# Patient Record
Sex: FEMALE | Race: WHITE | Employment: OTHER | URBAN - METROPOLITAN AREA
[De-identification: names, ages, dates, MRNs, and addresses within clinical notes are randomized per-mention and may not be internally consistent; named-entity substitution may affect disease eponyms.]

---

## 2017-01-10 ENCOUNTER — OFFICE VISIT (OUTPATIENT)
Dept: PALLIATIVE MEDICINE | Facility: CLINIC | Age: 62
End: 2017-01-10
Payer: MEDICARE

## 2017-01-10 VITALS
BODY MASS INDEX: 23.49 KG/M2 | WEIGHT: 150 LBS | HEART RATE: 69 BPM | DIASTOLIC BLOOD PRESSURE: 59 MMHG | SYSTOLIC BLOOD PRESSURE: 118 MMHG

## 2017-01-10 DIAGNOSIS — G89.29 CHRONIC BILATERAL LOW BACK PAIN WITH RIGHT-SIDED SCIATICA: ICD-10-CM

## 2017-01-10 DIAGNOSIS — M79.18 MYOFASCIAL PAIN: ICD-10-CM

## 2017-01-10 DIAGNOSIS — M96.1 FAILED BACK SURGICAL SYNDROME: ICD-10-CM

## 2017-01-10 DIAGNOSIS — M54.2 NECK PAIN: ICD-10-CM

## 2017-01-10 DIAGNOSIS — R25.2 SPASM: Primary | ICD-10-CM

## 2017-01-10 DIAGNOSIS — M54.41 CHRONIC BILATERAL LOW BACK PAIN WITH RIGHT-SIDED SCIATICA: ICD-10-CM

## 2017-01-10 PROCEDURE — 99214 OFFICE O/P EST MOD 30 MIN: CPT | Mod: 25 | Performed by: ANESTHESIOLOGY

## 2017-01-10 PROCEDURE — 20552 NJX 1/MLT TRIGGER POINT 1/2: CPT | Performed by: ANESTHESIOLOGY

## 2017-01-10 RX ORDER — METHOCARBAMOL 500 MG/1
500 TABLET, FILM COATED ORAL 3 TIMES DAILY PRN
Qty: 90 TABLET | Refills: 1 | Status: SHIPPED | OUTPATIENT
Start: 2017-01-10 | End: 2017-12-05

## 2017-01-10 RX ORDER — HYDROCODONE BITARTRATE AND ACETAMINOPHEN 5; 325 MG/1; MG/1
1 TABLET ORAL EVERY 6 HOURS PRN
Qty: 75 TABLET | Refills: 0 | Status: SHIPPED | OUTPATIENT
Start: 2017-01-10 | End: 2017-02-22

## 2017-01-10 ASSESSMENT — PAIN SCALES - GENERAL: PAINLEVEL: EXTREME PAIN (8)

## 2017-01-10 NOTE — MR AVS SNAPSHOT
After Visit Summary   1/10/2017    Yusra Rivas    MRN: [id number]           Patient Information     Date Of Birth          1955        Visit Information        Provider Department      1/10/2017 2:00 PM Antoine Ibrahim MD [facility]        Today's Diagnoses     Spasm-trapezius     -  1     Neck Pain-right occipital pain from fall          Myofascial pain           Care Instructions    Assessment:  1.  Chronic pain syndrome  2.  Chronic post fusion lower back pain  3.  Diffuse myofascial pain (muscle pain)  4.  Muscle spasm/dystonia  5.  History of closed head injury  6.  Lumbar radiculopathy (radiating pain)  7.  Depression/anxiety  8.  Right foot drop  9.  Proximal lower extremity weakness  10.  Chronic headache  11.  History of breast cancer, lung cancer    Plan:  1.  Pain Physical therapy eval and treat - continue with physical therapy  2.  Pain Psychology eval and treat - continue to practice coping skills, relaxation techniques, biofeedback as instructed by Santo Adames LP  3.  Continue Gabapentin 900 mg three times per day for nerve related pain and myofascial pain  4.  Continue Norco 5/325 - maximum 3 times per day on bad days.   5.  Continue Flexeril as needed for muscle spasm and to help with sleep  6.  Robaxin 750 mg TID prn spasm  7.  Continue Zonegran for headache  8.  Lumbar trigger point injections for myofascial pain - performed today and may be repeated as needed and may be double booked  9.  Continue home exercise    10.  Trigger point injections may be helpful for persistent muscle spasm - may be double booked  11.  Discussed the possibility of spinal cord stimulation as a modality to decrease pain and improve function - patient given DVDs today  12.  Follow up 8-10 weeks          Nurse Triage line:  [phone]   Call this number with any questions or concerns. You may leave a detailed message anytime. Calls are typically returned Monday through Friday  between 8 AM and 4:30 PM. We usually get back to you within 2 business days depending on the issue/request.       Medication refills:    For non-narcotic medications, call your pharmacy directly to request a refill. The pharmacy will contact the [facility] for authorization. Please allow 3-4 days for these refills to be processed.     For narcotic refills, call the nurse triage line or send a [employer] message. Please contact us 7-10 days before your refill is due. The message MUST include the name of the specific medication(s) requested and how you would like to receive the prescription(s). The options are as follows:    Pain Clinic staff can mail the prescription to your pharmacy. Please tell us the name of the pharmacy.    You may pick the prescription up at the Pain Clinic (tell us the location) or during a clinic visit with your pain provider    Pain Clinic staff can deliver the prescription to the [place] pharmacy in the clinic building. Please tell us the location.      Scheduling number: [phone].  Call this number to schedule or change appointments.    We believe regular attendance is key to your success in our program.    Any time you are unable to keep your appointment we ask that you call us at least 24 hours in advance to let us know. This will allow us to offer the appointment time to another patient.             Follow-ups after your visit        Your next 10 appointments already scheduled     Jan 18, 2017  1:00 PM   Return Visit with Kamaljit Ramsay LP   [facility] ([facility])    [address] [facility] [address]   [phone]            Jan 18, 2017  2:30 PM   Return Visit with Dheeraj Lara PT   [facility] ([facility])    [address] [address] [facility] [address]   [phone]            Feb 21, 2017  1:30 PM   LAB with NL LAB [facility] ([facility])     [address] [facility] [address]   [phone]           Patient must bring picture ID.  Patient should be prepared to give a urine specimen  Please do not eat 10-12 hours before your appointment if you are coming in fasting for labs on lipids, cholesterol, or glucose (sugar).  Pregnant women should follow their Care Team instructions. Water with medications is okay. Do not drink coffee or other fluids.   If you have concerns about taking  your medications, please ask at office or if scheduling via [employer], send a message by clicking on Secure Messaging, Message Your Care Team.            Feb 21, 2017  2:00 PM   CT CHEST W/O CONTRAST with PHCT1   [facility] CT Scan ([facility])    [address] [place] [address] [id number] [phone]           Please bring any scans or X-rays taken at other hospitals, if similar tests were done. Also bring a list of your medicines, including vitamins, minerals and over-the-counter drugs. It is safest to leave personal items at home.  Be sure to tell your doctor:   If you have any allergies.   If there s any chance you are pregnant.   If you are breastfeeding.   If you have any special needs.  You do not need to do anything special to prepare.  Please wear loose clothing, such as a sweat suit or jogging clothes. Avoid snaps, zippers and other metal. We may ask you to undress and put on a hospital gown.            Feb 28, 2017  1:00 PM   Return Visit with Sandra Arroyo MD   [facility]    [address] [place] [address] [id number] [phone]              Who to contact     If you have questions or need follow up information about today's clinic visit or your schedule please contact [facility] directly at [phone].  Normal or non-critical lab and imaging results will be communicated to you by MyChart, letter or phone within 4 business days after the clinic has received the  "results. If you do not hear from us within 7 days, please contact the clinic through [employer] or phone. If you have a critical or abnormal lab result, we will notify you by phone as soon as possible.  Submit refill requests through [employer] or call your pharmacy and they will forward the refill request to us. Please allow 3 business days for your refill to be completed.          Additional Information About Your Visit        appMobi Information     [employer]obi lets you send messages to your doctor, view your test results, renew your prescriptions, schedule appointments and more. To sign up, go to [email or web address] . Click on \"Log in\" on the left side of the screen, which will take you to the Welcome page. Then click on \"Sign up Now\" on the right side of the page.     You will be asked to enter the access code listed below, as well as some personal information. Please follow the directions to create your username and password.     Your access code is: [email or web address]  Expires: 4/10/2017  3:13 PM     Your access code will  in 90 days. If you need help or a new code, please call your [place] clinic or [phone].        Care EveryWhere ID     This is your Care EveryWhere ID. This could be used by other organizations to access your [place] medical records  [id number]        Your Vitals Were     Pulse                   69            Blood Pressure from Last 3 Encounters:   01/10/17 118/59   16 106/61   16 107/73    Weight from Last 3 Encounters:   01/10/17 68.04 kg (150 lb)   16 66.225 kg (146 lb)   16 66.225 kg (146 lb)              Today, you had the following     No orders found for display         Today's Medication Changes          These changes are accurate as of: 1/10/17  3:13 PM.  If you have any questions, ask your nurse or doctor.               Start taking these medicines.        Dose/Directions    methocarbamol 500 MG tablet   Commonly known as:  ROBAXIN   Used for:  " Myofascial pain        Dose:  500 mg   Take 1 tablet (500 mg) by mouth 3 times daily as needed for muscle spasms   Quantity:  90 tablet   Refills:  1         These medicines have changed or have updated prescriptions.        Dose/Directions    HYDROcodone-acetaminophen 5-325 MG per tablet   Commonly known as:  NORCO   This may have changed:  additional instructions   Used for:  Spasm, Neck pain        Dose:  1 tablet   Take 1 tablet by mouth every 6 hours as needed for pain Max of 3 tablets per day on bad days. This is a 30 day supply.  Dispense on/after 1/30/17. To start on 2/1/17.   Quantity:  75 tablet   Refills:  0            Where to get your medicines      These medications were sent to [facility] [place], [place] - [address]  [address] [facility] [address]     Phone:  [phone]    - methocarbamol 500 MG tablet      Some of these will need a paper prescription and others can be bought over the counter.  Ask your nurse if you have questions.     Bring a paper prescription for each of these medications    - HYDROcodone-acetaminophen 5-325 MG per tablet             Primary Care Provider Office Phone # Fax #    Munir Kamaljit Lynn [phone]       [address] [facility] [address]        Thank you!     Thank you for choosing [facility]  for your care. Our goal is always to provide you with excellent care. Hearing back from our patients is one way we can continue to improve our services. Please take a few minutes to complete the written survey that you may receive in the mail after your visit with us. Thank you!             Your Updated Medication List - Protect others around you: Learn how to safely use, store and throw away your medicines at www.disposemymeds.org.          This list is accurate as of: 1/10/17  3:13 PM.  Always use your most recent med list.                   Brand Name Dispense Instructions for use    aspirin 81 MG  tablet     30 tablet    Take 1 tablet (81 mg) by mouth daily       cholecalciferol 1000 UNIT tablet    vitamin D    200 tablet    TAKE TWO TABLETS BY MOUTH EVERY DAY       cyclobenzaprine 10 MG tablet    FLEXERIL    30 tablet    Take 1 tablet (10 mg) by mouth At Bedtime       * docusate sodium 100 MG tablet    COLACE    60 tablet    Take 100 mg by mouth daily       *  MG capsule   Generic drug:  docusate sodium     100 capsule    TAKE ONE CAPSULE BY MOUTH EVERY DAY       gabapentin 300 MG capsule    NEURONTIN    270 capsule    Increase the Gabapentin to 900 mg three time a day.       HYDROcodone-acetaminophen 5-325 MG per tablet    NORCO    75 tablet    Take 1 tablet by mouth every 6 hours as needed for pain Max of 3 tablets per day on bad days. This is a 30 day supply.  Dispense on/after 1/30/17. To start on 2/1/17.       methocarbamol 500 MG tablet    ROBAXIN    90 tablet    Take 1 tablet (500 mg) by mouth 3 times daily as needed for muscle spasms       metoprolol 50 MG tablet    LOPRESSOR    180 tablet    TAKE ONE TABLET BY MOUTH TWICE A DAY       multivitamin Tabs per tablet     60 tablet    TAKE ONE TABLET BY MOUTH EVERY DAY       omeprazole 20 MG CR capsule    priLOSEC    90 capsule    TAKE ONE CAPSULE BY MOUTH EVERY DAY       polyethylene glycol powder    MIRALAX/GLYCOLAX    527 g    STIR 17 GM OF POWDER (SEE DEXTER INSIDE CAP) IN 8-OZ OF LIQUID UNTIL COMPLETELY DISSOLVED. DRINK THE SOLUTION DAILY OR AS DIRECTED.       zonisamide 25 MG capsule    Zonegran         * Notice:  This list has 2 medication(s) that are the same as other medications prescribed for you. Read the directions carefully, and ask your doctor or other care provider to review them with you.

## 2017-01-10 NOTE — NURSING NOTE
"Chief Complaint   Patient presents with     Pain       Initial /59 mmHg  Pulse 69  Wt 68.04 kg (150 lb) Estimated body mass index is 23.49 kg/(m^2) as calculated from the following:    Height as of 11/30/16: 1.702 m (5' 7\").    Weight as of this encounter: 68.04 kg (150 lb).  BP completed using cuff size: ab Kinsey CMA (JAD)      "

## 2017-01-10 NOTE — PROGRESS NOTES
[facility]    Date of visit: 1/10/2017    Chief complaint:   Chief Complaint   Patient presents with     Pain       Interval history:  Yusra Rivas was last seen by me on 11/30/16.      Recommendations/plan at the last visit included:  Plan:  1.  Pain Physical therapy eval and treat - balance, stretching, strengthening  2.  Pain Psychology eval and treat - Santo Adames - coping skills, relaxation techniques, biofeedback, ect.  3.  Would increase the Gabapentin gradually to 900 mg three times per day for nerve related pain and myofascial pain  4.  Continue Norco 5/325 twice a day as prescribed.  I don't prescribe pain medications on the first visit.  Will get urine drug screen and if appropriate could take over prescribing until stable on medications at which time PCP could take back over.     5.  Continue Flexeril as needed for muscle spasm  6.  Continue Zonegran for headache  7.  May benefit from lumbar epidural steroid injection - will schedule caudal epidural  8.  Continue home exercise    9.  Trigger point injections may be helpful for persistent muscle spasm - may be double booked  10.  Follow up 6-8 weeks    Patient underwent Caudal epidural steroid injection on 12/12/16 in [place].  She states that she had improvement in her pain for about 2 days and then she started having pain and spam in the muscles along the spine on the right.    Since her last visit, Yusra Rivas reports:  Continued right lower back pain with pain down the right lower extremity to the knee.  She states that the left back is good and her left side is better since the injection.  The lower back pain is worse than the leg pain.  The right leg will give out occasionally as well with the pain.  She complains of numbness in the right thigh and she feels the right leg is weaker than the left with it giving out now and then.  The pain is described as aching, burning, throbbing, sharp. Worse with bending over and  better with putting pressure on the muscles of the lower back with her fists.    She continues to attend physical therapy and has only had one session since the epidural steroid injection.  She did have a couple of sessions with Santo Adames LP for relaxation techniques and coping skills.  She is stable on her medications without side effects.    Pain scores:  Pain intensity on average is 8 on a scale of 0-10.     Current pain treatments:   Norco 5/325 one tab BID  Gabapentin 300 mg 3 capsules TID  Flexeril 10 mg at bedtime    Past pain treatments:  Indomethacin - GI problems  Percocet - no different than hydrocodone  Relafen - made heart race    Side Effects: denies any problems    Medications:  Current Outpatient Prescriptions   Medication Sig Dispense Refill     polyethylene glycol (MIRALAX/GLYCOLAX) powder STIR 17 GM OF POWDER (SEE DEXTER INSIDE CAP) IN 8-OZ OF LIQUID UNTIL COMPLETELY DISSOLVED. DRINK THE SOLUTION DAILY OR AS DIRECTED. 527 g 5     HYDROcodone-acetaminophen (NORCO) 5-325 MG per tablet Take 1 tablet by mouth every 6 hours as needed for pain Max of 2 tablets per day. This is a 30 day supply.  Dispense on/after 1/2/17. To start on 1/3/17. 60 tablet 0      MG capsule TAKE ONE CAPSULE BY MOUTH EVERY  capsule 3     gabapentin (NEURONTIN) 300 MG capsule Increase the Gabapentin to 900 mg three time a day. 270 capsule 3     omeprazole (PRILOSEC) 20 MG capsule TAKE ONE CAPSULE BY MOUTH EVERY DAY 90 capsule 2     VITAMIN D3 1000 UNITS tablet TAKE TWO TABLETS BY MOUTH EVERY  tablet 3     cyclobenzaprine (FLEXERIL) 10 MG tablet Take 1 tablet (10 mg) by mouth At Bedtime 30 tablet 0     metoprolol (LOPRESSOR) 50 MG tablet TAKE ONE TABLET BY MOUTH TWICE A  tablet 3     zonisamide (ZONEGRAN) 25 MG capsule        Multiple Vitamins-Minerals (I-GOLDIE) TABS TAKE ONE TABLET BY MOUTH EVERY DAY 60 tablet 8     aspirin 81 MG tablet Take 1 tablet (81 mg) by mouth daily 30 tablet 11     docusate  sodium (COLACE) 100 MG tablet Take 100 mg by mouth daily 60 tablet 1       Medical History: any changes in medical history since they were last seen? No    Review of Systems:  The 14 system ROS was reviewed from the intake questionnaire, and is positive for: vision changes, ringing in the ears, stiffness, back pain, neck pain, weakness, numbness and tingling, tremor, stress  Any bowel or bladder problems: denies changes  Mood: good    Physical Exam:  /59 mmHg  Pulse 69  Wt 68.04 kg (150 lb)  Constitutional: alert and no distress.  Pt thin, healthy appearing  Head: Normocephalic. No masses, lesions, tenderness or abnormalities  ENT: EOMI, mucosal surfaces moist.  Neck with full ROM, posture fair  Cardiovascular: No edema or JVD appreciated.  Respiratory: Good diaphragmatic excursion. No wheezes appreciated.  Speaking in complete sentences without shortness of breath.  No accessory muscle use.   Musculoskeletal: extremities normal- no gross deformities noted, normal muscle tone and able to move about the exam room without difficulty.    Skin: no suspicious lesions or rashes appreciated on exposed areas  Neurologic: Gait initially antalgic - favors the right side.  Able to walk on toes and heels. Moving all extremities spontaneously, no apparent weakness.    Psychiatric: mentation appears normal, affect full and good eye contact.      Lumbar Spine:  Tender with palpable spasm of the lumbar paravertebral muscles on the right.  Tender at the right SI joint as well.  Flexes less than 90 degrees with pulling in back.  Facet loading negative.  Seated straight leg raise mildly positive on the right, negative left.    Trigger point injection procedure note:  Trigger points along the right lower lumbar spine were identified by patient, and marked when appropriate.  The area was prepped with Chloroprep.    Using clean technique, injections were completed using a 30 G, 1 inch needle.  After negative aspiration, injection  was completed.  A total of 6 locations were injected.  When possible, tissue was retracted from the chest wall to avoid lung injury.    Muscle groups injected:  Lumbar paravertebral muscles on the right    Injection solution contained:  8 ml of 0.5% bupivacaine combined with Depomedrol 20 mg.    Post procedure breath sounds were normal. Lower back pain and stiffness related to spasm was significantly improved following the injections.    Pre-procedure pain:  8/10     Post-procedure pain:  2/10      Assessment:  1.  Chronic pain syndrome  2.  Chronic post fusion lower back pain  3.  Diffuse myofascial pain (muscle pain)  4.  Muscle spasm/dystonia  5.  History of closed head injury  6.  Lumbar radiculopathy (radiating pain)  7.  Depression/anxiety  8.  Right foot drop  9.  Proximal lower extremity weakness  10.  Chronic headache  11.  History of breast cancer, lung cancer    Plan:  1.  Pain Physical therapy eval and treat - continue with physical therapy  2.  Pain Psychology eval and treat - continue to practice coping skills, relaxation techniques, biofeedback as instructed by Santo Adames LP  3.  Continue Gabapentin 900 mg three times per day for nerve related pain and myofascial pain  4.  Continue Norco 5/325 - maximum 3 times per day on bad days.   5.  Continue Flexeril as needed for muscle spasm and to help with sleep  6.  Robaxin 750 mg TID prn spasm  7.  Continue Zonegran for headache  8.  Lumbar trigger point injections for myofascial pain - performed today and may be repeated as needed and may be double booked  9.  Continue home exercise    10.  Trigger point injections may be helpful for persistent muscle spasm - may be double booked  11.  Discussed the possibility of spinal cord stimulation as a modality to decrease pain and improve function - patient given DVDs today  12.  Follow up 8-10 weeks      Total time spent was 45 minutes, and more than 50% of face to face time was spent in counseling and/or  coordination of care regarding medication management, physical therapy, home exercise, behavioral modification, and interventional procedures to decrease pain and improve function..

## 2017-01-10 NOTE — PATIENT INSTRUCTIONS
Assessment:  1.  Chronic pain syndrome  2.  Chronic post fusion lower back pain  3.  Diffuse myofascial pain (muscle pain)  4.  Muscle spasm/dystonia  5.  History of closed head injury  6.  Lumbar radiculopathy (radiating pain)  7.  Depression/anxiety  8.  Right foot drop  9.  Proximal lower extremity weakness  10.  Chronic headache  11.  History of breast cancer, lung cancer    Plan:  1.  Pain Physical therapy eval and treat - continue with physical therapy  2.  Pain Psychology eval and treat - continue to practice coping skills, relaxation techniques, biofeedback as instructed by Santo Adames LP  3.  Continue Gabapentin 900 mg three times per day for nerve related pain and myofascial pain  4.  Continue Norco 5/325 - maximum 3 times per day on bad days.   5.  Continue Flexeril as needed for muscle spasm and to help with sleep  6.  Robaxin 750 mg TID prn spasm  7.  Continue Zonegran for headache  8.  Lumbar trigger point injections for myofascial pain - performed today and may be repeated as needed and may be double booked  9.  Continue home exercise    10.  Trigger point injections may be helpful for persistent muscle spasm - may be double booked  11.  Discussed the possibility of spinal cord stimulation as a modality to decrease pain and improve function - patient given DVDs today  12.  Follow up 8-10 weeks          Nurse Triage line:  [phone]   Call this number with any questions or concerns. You may leave a detailed message anytime. Calls are typically returned Monday through Friday between 8 AM and 4:30 PM. We usually get back to you within 2 business days depending on the issue/request.       Medication refills:    For non-narcotic medications, call your pharmacy directly to request a refill. The pharmacy will contact the [facility] for authorization. Please allow 3-4 days for these refills to be processed.     For narcotic refills, call the nurse triage line or send a [employer] message.  Please contact us 7-10 days before your refill is due. The message MUST include the name of the specific medication(s) requested and how you would like to receive the prescription(s). The options are as follows:    Pain Clinic staff can mail the prescription to your pharmacy. Please tell us the name of the pharmacy.    You may pick the prescription up at the Pain Clinic (tell us the location) or during a clinic visit with your pain provider    Pain Clinic staff can deliver the prescription to the [place] pharmacy in the clinic building. Please tell us the location.      Scheduling number: [phone].  Call this number to schedule or change appointments.    We believe regular attendance is key to your success in our program.    Any time you are unable to keep your appointment we ask that you call us at least 24 hours in advance to let us know. This will allow us to offer the appointment time to another patient.

## 2017-01-11 ENCOUNTER — TRANSFERRED RECORDS (OUTPATIENT)
Dept: HEALTH INFORMATION MANAGEMENT | Facility: CLINIC | Age: 62
End: 2017-01-11

## 2017-01-18 ENCOUNTER — OFFICE VISIT (OUTPATIENT)
Dept: PALLIATIVE MEDICINE | Facility: CLINIC | Age: 62
End: 2017-01-18
Payer: MEDICARE

## 2017-01-18 DIAGNOSIS — G89.29 CHRONIC PAIN: Primary | ICD-10-CM

## 2017-01-18 DIAGNOSIS — G89.4 CHRONIC PAIN SYNDROME: Primary | ICD-10-CM

## 2017-01-18 DIAGNOSIS — M54.16 LUMBAR RADICULOPATHY: ICD-10-CM

## 2017-01-18 DIAGNOSIS — M79.18 MYOFASCIAL PAIN SYNDROME: ICD-10-CM

## 2017-01-18 PROCEDURE — 97112 NEUROMUSCULAR REEDUCATION: CPT | Mod: GP | Performed by: PHYSICAL THERAPIST

## 2017-01-18 PROCEDURE — 99215 OFFICE O/P EST HI 40 MIN: CPT | Performed by: PSYCHOLOGIST

## 2017-01-18 PROCEDURE — 97110 THERAPEUTIC EXERCISES: CPT | Mod: GP | Performed by: PHYSICAL THERAPIST

## 2017-01-18 NOTE — PROGRESS NOTES
"Patient Name: Yusra Rivas       YOB: 1955      Medical Record Number: [id number]  Diagnosis:     Chronic pain  Lumbar radiculopathy  Myofascial pain syndrome  Visit Info  Length of Visit: 45  Arrived: On Time    Exercise/Activity  Education Instruction:  Postural Training/Anatomy  Pacing/Energy Conservation  Home Program  Self Care  Activity:  There Ex 25':  Aerobic Conditioning (*see \"Strength/Functional Actitivities Record for details of exercises performed)  Declined bike (fearful it would flare her back but willing to do UBE, walking)  UBE x 6'  Jamison walk with cues for soft heel strike, mindful walking component  Stretching:  Upper Ext  bilateral, Lower Ext  Bilateral  Added stretch for standing calf  Postural Training:  walking focus  Standing in front of mirror, added scaption (arm raises) with cues for thumbs up, soft knee flexion, wider DONNA (pt was standing with feet together)    Neuro re-ed 15:  Work on neutral spine position rafia for starting position and prep for dynamic  Diaphragm breathing in sitting   Sequence of 'breathe, stabilize, move' concept with functional application  HR check with ed on relation to pain, nervous system.  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  Need to emphasize importance of HEP compliance to maximize pain management skills and ability. Pt was unable to verbalize the three chronic pain PT principles of Body Awareness, Self Care and Aerobic Exercise.  Pt does have handout with this info on it and this was stated and re-emphasized.   Demonstrates/Verbalizes Technique: 2, 3 (1= poor technique-difficulty performing exercises,significant cues required; 5= good technique-performs exercises without cues)  Body Awareness: 2, 3 (1=low awareness; 5=high " awareness)  Posture/Stability: 2, 3 (1= poor posture, stability; 5= good posture, stability)    Motivational Level:    Ask questions, Cooperative and Distracted, in pain  Participation:  Full    Patient Satisfaction:  satisfied    Response to Teaching:    cooperative, needs reinforcement and asked questions  Factors that affect learning: None    Plan of Care  Recommend to continue PT to support reactivation and integration of self regulation pain management skills. Will continue to emphasize the importance of patient's compliance with HEP and self care efforts to maximize her pain management skills and strategies.   Therapist: Dheeraj Lara PT                 Date:  1/18/2017

## 2017-01-19 NOTE — PROGRESS NOTES
[facility]   [facility], [place]  Behavioral Medicine Visit    Patient Name: Yusra Rivas    YOB: 1955   Medical Record Number: [id number]  Date: 1/18/2017                SUBJECTIVE:  Session objective: Reports she is about the same and she is okay with that. She reports she feels she is as equipped as she can be to cope with there pain and that she feels she has a solid plan in place. Reports she feels as though she is done for this episode of care    OBJECTIVE:   Length of Visit: 45 minutes      Mental status: healthy, alert and no distress    ASSESSMENT:   Axis I: Chronic pain syndrome  Axis II: Dx deferred    Progress toward goals: fair.      Pain Status: unchanged    Emotional Status: unchanged   Medication / chemical use concerns: None    PLAN:   Next Appointment: none  Assignment: none  Objectives / interventions for next session: none    Kamaljit Ramsay MA, LP, [facility]  Pain Specialist  [facility] [place]

## 2017-01-26 ENCOUNTER — TRANSFERRED RECORDS (OUTPATIENT)
Dept: HEALTH INFORMATION MANAGEMENT | Facility: CLINIC | Age: 62
End: 2017-01-26

## 2017-01-31 NOTE — PROGRESS NOTES
I have reviewed the Behavioral Medicine Visit note on Yusra Rivas by Santo Adames MA, LP which included discussion of pain coping skills and agree with the assessment and the plan to discontinue Behavioral Medicine visits at this time.    Antoine Narvaez MD  [facility]

## 2017-02-08 ENCOUNTER — OFFICE VISIT (OUTPATIENT)
Dept: PALLIATIVE MEDICINE | Facility: CLINIC | Age: 62
End: 2017-02-08
Payer: MEDICARE

## 2017-02-08 DIAGNOSIS — M54.16 LUMBAR RADICULOPATHY: ICD-10-CM

## 2017-02-08 DIAGNOSIS — M79.18 MYOFASCIAL PAIN SYNDROME: ICD-10-CM

## 2017-02-08 DIAGNOSIS — G89.29 CHRONIC PAIN: Primary | ICD-10-CM

## 2017-02-08 PROCEDURE — 97112 NEUROMUSCULAR REEDUCATION: CPT | Mod: GP | Performed by: PHYSICAL THERAPIST

## 2017-02-08 PROCEDURE — 97110 THERAPEUTIC EXERCISES: CPT | Mod: GP | Performed by: PHYSICAL THERAPIST

## 2017-02-08 NOTE — PROGRESS NOTES
"Patient Name: Yusra Rivas       YOB: 1955      Medical Record Number: [id number]  Diagnosis:     Chronic pain  Lumbar radiculopathy  Myofascial pain syndrome  Visit Info   Length of Visit: 45  Arrived: On Time     Exercise/Activity   Education Instruction:  Postural Training/Anatomy  Pacing/Energy Conservation  Home Program  Self Care  Activity:  There Ex 25':  Aerobic Conditioning (*see \"Strength/Functional Actitivities Record for details of exercises performed)  Bike x 5'  UBE x 8'  Jamison walk with brief postural cues  Stretching:  Upper Ext  bilateral, Lower Ext  Bilateral  Performed stretch routine with 'breathe, stabilize, move' prep  Postural Training:  walking focus  Standing in front of mirror, performed scaption with improved control, pace of movement    Neuro re-ed 15:  Verbal body scan with pt in supine position.  Emphasis on turning off power, overused muscles.  Cues to exhale through mouth helpful, rafia to turn off masseter. Gave Relaxation CD to simulate verbal body scan.    Worked on neutral spine position with walking focus  Diaphragm breathing in supine  Sequence of 'breathe, stabilize, move' concept with functional application  Continued ed and discussion on Self care and aerobic exercise    Notes:    Better tolerance to today's session than last visit.  Continues to slowly progress toward goals.  Need to emphasize importance of HEP compliance to maximize pain management skills and ability.    Demonstrates/Verbalizes Technique:  3 (1= poor technique-difficulty performing exercises,significant cues required; 5= good technique-performs exercises without cues)  Body Awareness:  3 (1=low awareness; 5=high awareness)  Posture/Stability:  3 (1= poor posture, stability; 5= good posture, stability)     Motivational Level:    Ask questions, Cooperative and Distracted, in pain   Participation:  Full     Patient Satisfaction:  satisfied     Response to Teaching:    cooperative, needs " reinforcement   Factors that affect learning: None     Plan of Care  Recommend to continue PT to support reactivation and integration of self regulation pain management skills. Will continue to emphasize the importance of patient's compliance with HEP and self care efforts to maximize her pain management skills and strategies.    Therapist: Dheeraj Lara PT                 Date:  2/8/2017

## 2017-02-09 ENCOUNTER — TRANSFERRED RECORDS (OUTPATIENT)
Dept: HEALTH INFORMATION MANAGEMENT | Facility: CLINIC | Age: 62
End: 2017-02-09

## 2017-02-21 ENCOUNTER — HOSPITAL ENCOUNTER (OUTPATIENT)
Dept: CT IMAGING | Facility: CLINIC | Age: 62
Discharge: HOME | End: 2017-02-21
Attending: INTERNAL MEDICINE | Admitting: INTERNAL MEDICINE
Payer: MEDICARE

## 2017-02-21 DIAGNOSIS — C50.911 MALIGNANT NEOPLASM OF RIGHT FEMALE BREAST, UNSPECIFIED SITE OF BREAST: ICD-10-CM

## 2017-02-21 DIAGNOSIS — C34.90 MALIGNANT NEOPLASM OF LUNG, UNSPECIFIED LATERALITY, UNSPECIFIED PART OF LUNG (H): ICD-10-CM

## 2017-02-21 LAB
ALBUMIN SERPL-MCNC: 3.3 G/DL (ref 3.4–5)
ALP SERPL-CCNC: 66 U/L (ref 40–150)
ALT SERPL W P-5'-P-CCNC: 30 U/L (ref 0–50)
ANION GAP SERPL CALCULATED.3IONS-SCNC: 9 MMOL/L (ref 3–14)
AST SERPL W P-5'-P-CCNC: 16 U/L (ref 0–45)
BASOPHILS # BLD AUTO: 0 10E9/L (ref 0–0.2)
BASOPHILS NFR BLD AUTO: 0.3 %
BILIRUB SERPL-MCNC: 0.3 MG/DL (ref 0.2–1.3)
BUN SERPL-MCNC: 22 MG/DL (ref 7–30)
CALCIUM SERPL-MCNC: 8.6 MG/DL (ref 8.5–10.1)
CHLORIDE SERPL-SCNC: 108 MMOL/L (ref 94–109)
CO2 SERPL-SCNC: 24 MMOL/L (ref 20–32)
CREAT SERPL-MCNC: 0.71 MG/DL (ref 0.52–1.04)
DIFFERENTIAL METHOD BLD: NORMAL
EOSINOPHIL # BLD AUTO: 0.2 10E9/L (ref 0–0.7)
EOSINOPHIL NFR BLD AUTO: 3 %
ERYTHROCYTE [DISTWIDTH] IN BLOOD BY AUTOMATED COUNT: 13.1 % (ref 10–15)
GFR SERPL CREATININE-BSD FRML MDRD: 83 ML/MIN/1.7M2
GLUCOSE SERPL-MCNC: 85 MG/DL (ref 70–99)
HCT VFR BLD AUTO: 38.2 % (ref 35–47)
HGB BLD-MCNC: 12.7 G/DL (ref 11.7–15.7)
IMM GRANULOCYTES # BLD: 0 10E9/L (ref 0–0.4)
IMM GRANULOCYTES NFR BLD: 0.1 %
LYMPHOCYTES # BLD AUTO: 3.2 10E9/L (ref 0.8–5.3)
LYMPHOCYTES NFR BLD AUTO: 41.6 %
MCH RBC QN AUTO: 30.3 PG (ref 26.5–33)
MCHC RBC AUTO-ENTMCNC: 33.2 G/DL (ref 31.5–36.5)
MCV RBC AUTO: 91 FL (ref 78–100)
MONOCYTES # BLD AUTO: 0.6 10E9/L (ref 0–1.3)
MONOCYTES NFR BLD AUTO: 7.4 %
NEUTROPHILS # BLD AUTO: 3.6 10E9/L (ref 1.6–8.3)
NEUTROPHILS NFR BLD AUTO: 47.6 %
PLATELET # BLD AUTO: 247 10E9/L (ref 150–450)
POTASSIUM SERPL-SCNC: 3.9 MMOL/L (ref 3.4–5.3)
PROT SERPL-MCNC: 6.6 G/DL (ref 6.8–8.8)
RBC # BLD AUTO: 4.19 10E12/L (ref 3.8–5.2)
SODIUM SERPL-SCNC: 141 MMOL/L (ref 133–144)
WBC # BLD AUTO: 7.6 10E9/L (ref 4–11)

## 2017-02-21 PROCEDURE — 71260 CT THORAX DX C+: CPT

## 2017-02-21 PROCEDURE — 86300 IMMUNOASSAY TUMOR CA 15-3: CPT | Performed by: INTERNAL MEDICINE

## 2017-02-21 PROCEDURE — 25000125 ZZHC RX 250: Performed by: RADIOLOGY

## 2017-02-21 PROCEDURE — 25500064 ZZH RX 255 OP 636: Performed by: RADIOLOGY

## 2017-02-21 PROCEDURE — 36415 COLL VENOUS BLD VENIPUNCTURE: CPT | Performed by: INTERNAL MEDICINE

## 2017-02-21 PROCEDURE — 80053 COMPREHEN METABOLIC PANEL: CPT | Performed by: INTERNAL MEDICINE

## 2017-02-21 PROCEDURE — 85025 COMPLETE CBC W/AUTO DIFF WBC: CPT | Performed by: INTERNAL MEDICINE

## 2017-02-21 RX ORDER — IOPAMIDOL 755 MG/ML
500 INJECTION, SOLUTION INTRAVASCULAR ONCE
Status: COMPLETED | OUTPATIENT
Start: 2017-02-21 | End: 2017-02-21

## 2017-02-21 RX ADMIN — SODIUM CHLORIDE 75 ML: 9 INJECTION, SOLUTION INTRAVENOUS at 14:24

## 2017-02-21 RX ADMIN — IOPAMIDOL 75 ML: 755 INJECTION, SOLUTION INTRAVENOUS at 14:24

## 2017-02-22 DIAGNOSIS — M54.2 NECK PAIN: ICD-10-CM

## 2017-02-22 DIAGNOSIS — R25.2 SPASM: ICD-10-CM

## 2017-02-22 LAB — CANCER AG27-29 SERPL-ACNC: 22 U/ML (ref 0–39)

## 2017-02-22 RX ORDER — HYDROCODONE BITARTRATE AND ACETAMINOPHEN 5; 325 MG/1; MG/1
1 TABLET ORAL EVERY 6 HOURS PRN
Qty: 75 TABLET | Refills: 0 | Status: SHIPPED | OUTPATIENT
Start: 2017-02-22 | End: 2017-03-24

## 2017-02-22 NOTE — TELEPHONE ENCOUNTER
Medication refill information reviewed.     Last due:   Dispense on/after 1/30/17. To start on 2/1/17 but picked up on 2/2/17    Due date:  3/4/17    Weaning instructions:  N/A    Prescriptions prepped for review.     Angélica Benites, RN-BSN  [facility]

## 2017-02-22 NOTE — TELEPHONE ENCOUNTER
Pt LM at 1102:    Pt would like a refill of hydrocodone, 5/325 mg. Would like the Rx mailed to the [facility] pharmacy. Please call with any questions.   ------  Will route to MA pool for assistance with gathering opioid refill information.    MYLENE DobsonN, RN-BC  Patient Care Supervisor/Care Coordinator  [facility]

## 2017-02-22 NOTE — TELEPHONE ENCOUNTER
Signed Prescriptions:                        Disp   Refills    HYDROcodone-acetaminophen (NORCO) 5-325 MG*75 tab*0        Sig: Take 1 tablet by mouth every 6 hours as needed for           pain Max of 3 tablets per day on bad days. This           is a 30 day supply.  Dispense on/after 3/2/17. To           start on 3/4/17  Authorizing Provider: ANTOINE SU    Reviewed, printed, and signed in [facility].    Antoine Narvaez MD  [facility]

## 2017-02-22 NOTE — TELEPHONE ENCOUNTER
Received call from patient requesting refill(s) of HYDROcodone-acetaminophen (NORCO) 5-325 MG per tablet    Last picked up from pharmacy on 02/02/17    Pt last seen by prescribing provider on 01/10/17  Next appt scheduled for 03/14/17    Last urine drug screen date 11/30/16  Current opioid agreement on file (completed within the last year) Yes Date of opioid agreement: 12/21/16    Processing (pick one and delete the others):      Mail to [facility]   [address] [facility] [address]    Will route to nursing Alda for review and preparation of prescription(s).

## 2017-02-28 ENCOUNTER — ONCOLOGY VISIT (OUTPATIENT)
Dept: ONCOLOGY | Facility: CLINIC | Age: 62
End: 2017-02-28
Payer: MEDICARE

## 2017-02-28 VITALS
HEIGHT: 67 IN | TEMPERATURE: 97.8 F | SYSTOLIC BLOOD PRESSURE: 114 MMHG | WEIGHT: 150 LBS | BODY MASS INDEX: 23.54 KG/M2 | HEART RATE: 88 BPM | DIASTOLIC BLOOD PRESSURE: 66 MMHG | OXYGEN SATURATION: 98 % | RESPIRATION RATE: 16 BRPM

## 2017-02-28 DIAGNOSIS — C34.90 MALIGNANT NEOPLASM OF LUNG, UNSPECIFIED LATERALITY, UNSPECIFIED PART OF LUNG (H): ICD-10-CM

## 2017-02-28 DIAGNOSIS — C50.911 MALIGNANT NEOPLASM OF RIGHT FEMALE BREAST, UNSPECIFIED SITE OF BREAST: Primary | ICD-10-CM

## 2017-02-28 PROCEDURE — 99214 OFFICE O/P EST MOD 30 MIN: CPT | Performed by: INTERNAL MEDICINE

## 2017-02-28 ASSESSMENT — PAIN SCALES - GENERAL: PAINLEVEL: MODERATE PAIN (4)

## 2017-02-28 NOTE — NURSING NOTE
"Yusra Rivas is a 61 year old female who presents for:  Chief Complaint   Patient presents with     Oncology Clinic Visit     6 month follow up for breast cancer     Results     Labs and CT chest 2/21/2017        Initial Vitals:  /66 (BP Location: Left arm, Patient Position: Chair, Cuff Size: Adult Regular)  Pulse 88  Temp 97.8  F (36.6  C) (Temporal)  Resp 16  Ht 1.702 m (5' 7\")  Wt 68 kg (150 lb)  SpO2 98%  BMI 23.49 kg/m2 Estimated body mass index is 23.49 kg/(m^2) as calculated from the following:    Height as of this encounter: 1.702 m (5' 7\").    Weight as of this encounter: 68 kg (150 lb).. Body surface area is 1.79 meters squared. BP completed using cuff size: regular  Moderate Pain (4) No LMP recorded. Patient is postmenopausal. Allergies and medications reviewed.     Medications: Medication refills not needed today.  Pharmacy name entered into [employer]: [facility] [place], [place] - [address]     Comments:       Vanessa Almeida MA        "

## 2017-02-28 NOTE — PROGRESS NOTES
Hematology/ Oncology Follow-up Visit:  Feb 28, 2017    Reason for Visit:   Chief Complaint   Patient presents with     Oncology Clinic Visit     6 month follow up for breast cancer     Results     Labs and CT chest 2/21/2017       Oncologic History:  Breast cancer (H)  Yusra Rivas was diagnosed in 2000, had a lumpectomy, eventually double mastectomy, TRAM flap procedure, identified right side breast cancer, grade 3 infiltrating ductal cancer, 1 out of 5 positive nodes, ER/[place] negative. HER-2/abelino was not done. She received AC followed by Taxol, finished end of 2001. She is on routine surveillance with breast cancer, found to have increasing size of the pulmonary nodules. PET scan 01/2013 found hypermetabolic right upper lobe anterior lateral portion apex 2.4 cm lesion, SUV 14.7. CT-guided right upper lobe biopsy 01/2013 identified poorly differentiated carcinoma, favor lung primary after IHC stain, HER-2 IHC is negative.  She had bilobectomy 2/2013 found to have RLL 1.8 cm adenoCA, mod diff, Grade II; RUL 2.8 cm, mod to poorly diff adeno ca, grade III, total 18 LN - from stations 4, 9. 10, positive visceral pleural involvemnet by RUL lesion, pT2a N0M0 stage IB multifocal disease Adjuvant chemo with cisplatin and Taxotere from 4/2013 to 6/2013      Interval History:  The patient is returning today for follow-up visit. She's been having some infection in the teeth and neck some adenopathy. She is currently on antibiotic. Otherwise she denies any recent history of cough or expectoration. She denies any hemoptysis. She denies any chest pain. She denies any recent weight loss or night sweats or fever or chills.      Review Of Systems:  Constitutional: Negative for fever, chills, and night sweats.  Skin: negative.  Eyes: negative.  Ears/Nose/Throat: negative.  Respiratory: No shortness of breath, dyspnea on exertion, cough, or hemoptysis.  Cardiovascular: negative.  Gastrointestinal: negative.  Genitourinary:  negative.  Musculoskeletal: negative.  Neurologic: negative.  Psychiatric: negative.  Hematologic/Lymphatic/Immunologic: negative.  Endocrine: negative.    All other ROS negative unless mentioned in interval history.    Past medical, social, surgical, and family histories reviewed.    Allergies:  Allergies as of 02/28/2017 - Dexter as Reviewed 02/28/2017   Allergen Reaction Noted     Celexa [citalopram hydrobromide] Difficulty breathing 05/04/2010     Cardizem cd  03/16/2012     Naprosyn [naproxen] Palpitations 12/12/2016     Relafen [nabumetone] Palpitations 12/12/2016       Current Medications:  Current Outpatient Prescriptions   Medication Sig Dispense Refill     amoxicillin-clavulanate (AUGMENTIN) 125-31.25 MG/5ML suspension Take 45 mg/kg/day by mouth 2 times daily       HYDROcodone-acetaminophen (NORCO) 5-325 MG per tablet Take 1 tablet by mouth every 6 hours as needed for pain Max of 3 tablets per day on bad days. This is a 30 day supply.  Dispense on/after 3/2/17. To start on 3/4/17 75 tablet 0     methocarbamol (ROBAXIN) 500 MG tablet Take 1 tablet (500 mg) by mouth 3 times daily as needed for muscle spasms 90 tablet 1     polyethylene glycol (MIRALAX/GLYCOLAX) powder STIR 17 GM OF POWDER (SEE DEXTER INSIDE CAP) IN 8-OZ OF LIQUID UNTIL COMPLETELY DISSOLVED. DRINK THE SOLUTION DAILY OR AS DIRECTED. 527 g 5      MG capsule TAKE ONE CAPSULE BY MOUTH EVERY  capsule 3     gabapentin (NEURONTIN) 300 MG capsule Increase the Gabapentin to 900 mg three time a day. 270 capsule 3     omeprazole (PRILOSEC) 20 MG capsule TAKE ONE CAPSULE BY MOUTH EVERY DAY 90 capsule 2     VITAMIN D3 1000 UNITS tablet TAKE TWO TABLETS BY MOUTH EVERY  tablet 3     cyclobenzaprine (FLEXERIL) 10 MG tablet Take 1 tablet (10 mg) by mouth At Bedtime 30 tablet 0     metoprolol (LOPRESSOR) 50 MG tablet TAKE ONE TABLET BY MOUTH TWICE A  tablet 3     zonisamide (ZONEGRAN) 25 MG capsule        Multiple Vitamins-Minerals  "(I-GOLDIE) TABS TAKE ONE TABLET BY MOUTH EVERY DAY 60 tablet 8     aspirin 81 MG tablet Take 1 tablet (81 mg) by mouth daily 30 tablet 11     docusate sodium (COLACE) 100 MG tablet Take 100 mg by mouth daily 60 tablet 1        Physical Exam:  /66 (BP Location: Left arm, Patient Position: Chair, Cuff Size: Adult Regular)  Pulse 88  Temp 97.8  F (36.6  C) (Temporal)  Resp 16  Ht 1.702 m (5' 7\")  Wt 68 kg (150 lb)  SpO2 98%  BMI 23.49 kg/m2  Wt Readings from Last 12 Encounters:   02/28/17 68 kg (150 lb)   01/10/17 68 kg (150 lb)   11/30/16 66.2 kg (146 lb)   09/22/16 66.2 kg (146 lb)   08/30/16 66.5 kg (146 lb 11.2 oz)   08/23/16 66.2 kg (146 lb)   07/07/16 66 kg (145 lb 6.4 oz)   06/21/16 64.4 kg (142 lb)   05/10/16 67.9 kg (149 lb 11.2 oz)   04/08/16 69.4 kg (153 lb)   03/29/16 70.5 kg (155 lb 8 oz)   03/04/16 74.2 kg (163 lb 8 oz)     ECOG performance status: 0  GENERAL APPEARANCE: Healthy, alert and in no acute distress.  HEENT: Sclerae anicteric. PERRLA. Oropharynx without ulcers, lesions, or thrush.  NECK: Supple. No asymmetry or masses.  LYMPHATICS: No palpable cervical, supraclavicular, axillary, or inguinal lymphadenopathy.  RESP: Lungs clear to auscultation bilaterally without rales, rhonchi or wheezes.  BREAST: Scars on bilateral mastectomy with reconstruction without any evidence of dominant masses or adenopathy.\" \"CARDIOVASCULAR: Regular rate and rhythm. Normal S1, S2; no S3 or S4. No murmur, gallop, or rub.  ABDOMEN: Soft, nontender. Bowel sounds normal. No palpable organomegaly or masses.  MUSCULOSKELETAL: Extremities without gross deformities noted. No edema of bilateral lower extremities.  SKIN: No suspicious lesions or rashes.  NEURO: Alert and oriented x 3. Cranial nerves II-XII grossly intact.  PSYCHIATRIC: Mentation and affect appear normal.    Laboratory/Imaging Studies:  Orders Only on 02/21/2017   Component Date Value Ref Range Status     WBC 02/21/2017 7.6  4.0 - 11.0 10e9/L Final     RBC " Count 02/21/2017 4.19  3.8 - 5.2 10e12/L Final     Hemoglobin 02/21/2017 12.7  11.7 - 15.7 g/dL Final     Hematocrit 02/21/2017 38.2  35.0 - 47.0 % Final     MCV 02/21/2017 91  78 - 100 fl Final     MCH 02/21/2017 30.3  26.5 - 33.0 pg Final     MCHC 02/21/2017 33.2  31.5 - 36.5 g/dL Final     RDW 02/21/2017 13.1  10.0 - 15.0 % Final     Platelet Count 02/21/2017 247  150 - 450 10e9/L Final     Diff Method 02/21/2017 Automated Method   Final     % Neutrophils 02/21/2017 47.6  % Final     % Lymphocytes 02/21/2017 41.6  % Final     % Monocytes 02/21/2017 7.4  % Final     % Eosinophils 02/21/2017 3.0  % Final     % Basophils 02/21/2017 0.3  % Final     % Immature Granulocytes 02/21/2017 0.1  % Final     Absolute Neutrophil 02/21/2017 3.6  1.6 - 8.3 10e9/L Final     Absolute Lymphocytes 02/21/2017 3.2  0.8 - 5.3 10e9/L Final     Absolute Monocytes 02/21/2017 0.6  0.0 - 1.3 10e9/L Final     Absolute Eosinophils 02/21/2017 0.2  0.0 - 0.7 10e9/L Final     Absolute Basophils 02/21/2017 0.0  0.0 - 0.2 10e9/L Final     Abs Immature Granulocytes 02/21/2017 0.0  0 - 0.4 10e9/L Final     Sodium 02/21/2017 141  133 - 144 mmol/L Final     Potassium 02/21/2017 3.9  3.4 - 5.3 mmol/L Final     Chloride 02/21/2017 108  94 - 109 mmol/L Final     Carbon Dioxide 02/21/2017 24  20 - 32 mmol/L Final     Anion Gap 02/21/2017 9  3 - 14 mmol/L Final     Glucose 02/21/2017 85  70 - 99 mg/dL Final     Urea Nitrogen 02/21/2017 22  7 - 30 mg/dL Final     Creatinine 02/21/2017 0.71  0.52 - 1.04 mg/dL Final     GFR Estimate 02/21/2017 83  >60 mL/min/1.7m2 Final    Non  GFR Calc     GFR Estimate If Black 02/21/2017   >60 mL/min/1.7m2 Final                    Value:>90   GFR Calc       Calcium 02/21/2017 8.6  8.5 - 10.1 mg/dL Final     Bilirubin Total 02/21/2017 0.3  0.2 - 1.3 mg/dL Final     Albumin 02/21/2017 3.3* 3.4 - 5.0 g/dL Final     Protein Total 02/21/2017 6.6* 6.8 - 8.8 g/dL Final     Alkaline Phosphatase  02/21/2017 66  40 - 150 U/L Final     ALT 02/21/2017 30  0 - 50 U/L Final     AST 02/21/2017 16  0 - 45 U/L Final     CA 27-29 02/21/2017 22  0 - 39 U/mL Final    Assay Method:  Chemiluminescence using Siemens Centaur XP        Recent Results (from the past 744 hour(s))   CT Chest w Contrast    Narrative    CT CHEST With CONTRAST  2/21/2017 2:39 PM    HISTORY: Followup malignant neoplasm of unspecified part of  unspecified bronchus or lung.    TECHNIQUE: Scans obtained from the apices through the diaphragm with  75 mL Isovue-370 IV contrast injected. Radiation dose for this scan  was reduced using automated exposure control, adjustment of the mA  and/or kV according to patient size, or iterative reconstruction  technique.    COMPARISON: Chest x-rays dated 8/23/2016 and CT chest dated 2/11/2016.  CT chest, abdomen and pelvis dated 9/15/2014.    FINDINGS: Multiple focal areas of scarring are seen in the lungs. One  in the right upper lobe anterior to the major fissure laterally (image  12-16 series 3) is stable since the prior study. Another in the  posterolateral right lower lobe (images 25-34 series 3) is stable.  Calcified pleural plaquing in the posterior right hemithorax  inferiorly is stable since 2014 (images 30-45 series 3). Small nodule  in the right lateral lung (lower lobe on image 19 series 3) measures  0.3 cm and is not significantly changed since the prior studies. Lungs  are otherwise clear. No new nodule, mass, infiltrate, effusion,  pneumothorax.    No mediastinal, hilar, or axillary lymphadenopathy. The heart is  normal in size.    Patient is status post cholecystectomy. Visualized portions of the  upper abdominal contents are otherwise unremarkable. Postoperative  changes are again seen in the lower thoracic and visualized portions  of the lumbar spine.    Aorta demonstrates no evidence for dissection. Minimal ectasia of the  ascending aorta measures up to 3.8 cm. No evidence for  significant  aneurysmal dilatation is identified.    No filling defects are seen in the central pulmonary arteries to  suggest pulmonary artery embolism. This study was not optimized for  pulmonary artery evaluation.      Impression    IMPRESSION:  1. Stable (since 9/15/2014) postoperative changes in the right lung  status post partial resection with associated scarring. No new  pulmonary findings to suggest malignancy.  2. No other significant changes. No metastases are identified.    DEXTER SHERMAN MD       Assessment and plan:  (C50.911) Malignant neoplasm of right female breast, unspecified site of breast (H)  (primary encounter diagnosis)  (C34.90) Malignant neoplasm of lung, unspecified laterality, unspecified part of lung (H)    I reviewed with the patient today most recent laboratory results and imaging studies. I reviewed with the patient the images from most recent CT of the chest. There is no clinical evidence on disease recurrence. I will see the patient again in 6 months or sooner if there is new developments or concerns.  Plan: CBC with platelets differential, Comprehensive metabolic panel    The patient is ready to learn, no apparent learning barriers were identified.  Diagnosis and treatment plans were explained to the patient. The patient expressed understanding of the content. The patient asked appropriate questions. The patient questions were answered to her satisfaction.    Chart documentation with Dragon Voice recognition Software. Although reviewed after completion, some words and grammatical errors may remain.

## 2017-02-28 NOTE — ASSESSMENT & PLAN NOTE
Yusra Rivas was diagnosed in 2000, had a lumpectomy, eventually double mastectomy, TRAM flap procedure, identified right side breast cancer, grade 3 infiltrating ductal cancer, 1 out of 5 positive nodes, ER/[place] negative. HER-2/abelino was not done. She received AC followed by Taxol, finished end of 2001. She is on routine surveillance with breast cancer, found to have increasing size of the pulmonary nodules. PET scan 01/2013 found hypermetabolic right upper lobe anterior lateral portion apex 2.4 cm lesion, SUV 14.7. CT-guided right upper lobe biopsy 01/2013 identified poorly differentiated carcinoma, favor lung primary after IHC stain, HER-2 IHC is negative.  She had bilobectomy 2/2013 found to have RLL 1.8 cm adenoCA, mod diff, Grade II; RUL 2.8 cm, mod to poorly diff adeno ca, grade III, total 18 LN - from stations 4, 9. 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

## 2017-02-28 NOTE — PATIENT INSTRUCTIONS
Please follow up with Dr. Ott or available Oncologist in 6 months.  Please come 15-30 minutes early to appointment for labs.    Follow Up Date/Time:     If you have any questions or concerns please feel free to call.    Jeffry Smith RN, BSN   Oncology Care Coordinator RN  [facility]  [phone]

## 2017-02-28 NOTE — NURSING NOTE
DISCHARGE PLAN:  Next appointments: See patient instruction section  Departure Mode: Ambulatory  Accompanied by: self  5 minutes for nursing discharge (face to face time)     Yusra Rivas is here today for Oncology follow up for Breast Cancer.  Writing nurse seen patient after Medical Oncology appointment to address questions/concerns/coordinate care. Patient to follow up in 6 months with labs. Patient ambulated by nurse to  to schedule follow up and/or lab appointments. See patient instructions and Oncologist's Progress note for further details. Questions and concerns addressed to patient's satisfaction. Patient verbalized and demonstrated understanding of plan.  Contact information provided and patient is encouraged to call with any that arise in the interim of care.    Jeffry Smith, RN, BSN, OCN   Oncology Care Coordinator RN  [facility]  [phone]  2/28/2017, 1:30 PM

## 2017-02-28 NOTE — MR AVS SNAPSHOT
After Visit Summary   2/28/2017    Yusra Rivas    MRN: [id number]           Patient Information     Date Of Birth          1955        Visit Information        Provider Department      2/28/2017 1:00 PM Sandra Arroyo MD [facility]        Today's Diagnoses     Malignant neoplasm of right female breast, unspecified site of breast (H)    -  1    Malignant neoplasm of lung, unspecified laterality, unspecified part of lung (H)          Care Instructions      Please follow up with Dr. Ott or available Oncologist in 6 months.  Please come 15-30 minutes early to appointment for labs.    Follow Up Date/Time:     If you have any questions or concerns please feel free to call.    Jeffry Smith, RN, BSN   Oncology Care Coordinator RN  [facility]  [phone]            Follow-ups after your visit        Follow-up notes from your care team     Return in about 6 months (around 8/28/2017) for Blood work before next appointment.      Your next 10 appointments already scheduled     Mar 08, 2017  2:30 PM CST   Return Visit with Dheeraj Lara PT   [facility] ([facility])    [address] [address]   [phone]            Mar 14, 2017  2:00 PM CDT   Return Visit with Antoine Narvaez MD   [facility] ([facility])    [address] [address]   [phone]              Future tests that were ordered for you today     Open Future Orders        Priority Expected Expires Ordered    CBC with platelets differential Routine 8/28/2017 2/28/2018 2/28/2017    Comprehensive metabolic panel Routine 8/28/2017 2/28/2018 2/28/2017            Who to contact     If you have questions or need follow up information about today's clinic visit or your schedule please contact [facility] directly at [phone].  Normal or non-critical lab and imaging results  "will be communicated to you by MyChart, letter or phone within 4 business days after the clinic has received the results. If you do not hear from us within 7 days, please contact the clinic through AtTask or phone. If you have a critical or abnormal lab result, we will notify you by phone as soon as possible.  Submit refill requests through AtTask or call your pharmacy and they will forward the refill request to us. Please allow 3 business days for your refill to be completed.          Additional Information About Your Visit        AtTask Information     AtTask lets you send messages to your doctor, view your test results, renew your prescriptions, schedule appointments and more. To sign up, go to [email or web address] . Click on \"Log in\" on the left side of the screen, which will take you to the Welcome page. Then click on \"Sign up Now\" on the right side of the page.     You will be asked to enter the access code listed below, as well as some personal information. Please follow the directions to create your username and password.     Your access code is: [email or web address]  Expires: 4/10/2017  3:13 PM     Your access code will  in 90 days. If you need help or a new code, please call your [place] clinic or [phone].        Care EveryWhere ID     This is your Care EveryWhere ID. This could be used by other organizations to access your [place] medical records  [id number]        Your Vitals Were     Pulse Temperature Respirations Height Pulse Oximetry BMI (Body Mass Index)    88 97.8  F (36.6  C) (Temporal) 16 1.702 m (5' 7\") 98% 23.49 kg/m2       Blood Pressure from Last 3 Encounters:   17 114/66   01/10/17 118/59   16 106/61    Weight from Last 3 Encounters:   17 68 kg (150 lb)   01/10/17 68 kg (150 lb)   16 66.2 kg (146 lb)               Primary Care Provider Office Phone # Fax #    Munir Kamaljit Lynn [phone]       [address] " [address] [place] [address]        Thank you!     Thank you for choosing [facility]  for your care. Our goal is always to provide you with excellent care. Hearing back from our patients is one way we can continue to improve our services. Please take a few minutes to complete the written survey that you may receive in the mail after your visit with us. Thank you!             Your Updated Medication List - Protect others around you: Learn how to safely use, store and throw away your medicines at www.disposemymeds.org.          This list is accurate as of: 2/28/17  1:27 PM.  Always use your most recent med list.                   Brand Name Dispense Instructions for use    amoxicillin-clavulanate 125-31.25 MG/5ML suspension    AUGMENTIN     Take 45 mg/kg/day by mouth 2 times daily       aspirin 81 MG tablet     30 tablet    Take 1 tablet (81 mg) by mouth daily       cholecalciferol 1000 UNIT tablet    vitamin D    200 tablet    TAKE TWO TABLETS BY MOUTH EVERY DAY       cyclobenzaprine 10 MG tablet    FLEXERIL    30 tablet    Take 1 tablet (10 mg) by mouth At Bedtime       * docusate sodium 100 MG tablet    COLACE    60 tablet    Take 100 mg by mouth daily       *  MG capsule   Generic drug:  docusate sodium     100 capsule    TAKE ONE CAPSULE BY MOUTH EVERY DAY       gabapentin 300 MG capsule    NEURONTIN    270 capsule    Increase the Gabapentin to 900 mg three time a day.       HYDROcodone-acetaminophen 5-325 MG per tablet    NORCO    75 tablet    Take 1 tablet by mouth every 6 hours as needed for pain Max of 3 tablets per day on bad days. This is a 30 day supply.  Dispense on/after 3/2/17. To start on 3/4/17       methocarbamol 500 MG tablet    ROBAXIN    90 tablet    Take 1 tablet (500 mg) by mouth 3 times daily as needed for muscle spasms       metoprolol 50 MG tablet    LOPRESSOR    180 tablet    TAKE ONE TABLET BY MOUTH TWICE A DAY       multivitamin Tabs per tablet     60 tablet    TAKE ONE  TABLET BY MOUTH EVERY DAY       omeprazole 20 MG CR capsule    priLOSEC    90 capsule    TAKE ONE CAPSULE BY MOUTH EVERY DAY       polyethylene glycol powder    MIRALAX/GLYCOLAX    527 g    STIR 17 GM OF POWDER (SEE DEXTER INSIDE CAP) IN 8-OZ OF LIQUID UNTIL COMPLETELY DISSOLVED. DRINK THE SOLUTION DAILY OR AS DIRECTED.       zonisamide 25 MG capsule    Zonegran         * Notice:  This list has 2 medication(s) that are the same as other medications prescribed for you. Read the directions carefully, and ask your doctor or other care provider to review them with you.

## 2017-03-08 ENCOUNTER — OFFICE VISIT (OUTPATIENT)
Dept: PALLIATIVE MEDICINE | Facility: CLINIC | Age: 62
End: 2017-03-08
Payer: MEDICARE

## 2017-03-08 DIAGNOSIS — G89.29 CHRONIC PAIN: Primary | ICD-10-CM

## 2017-03-08 DIAGNOSIS — M54.16 LUMBAR RADICULOPATHY: ICD-10-CM

## 2017-03-08 DIAGNOSIS — M79.18 MYOFASCIAL PAIN SYNDROME: ICD-10-CM

## 2017-03-08 PROCEDURE — 97110 THERAPEUTIC EXERCISES: CPT | Mod: GP | Performed by: PHYSICAL THERAPIST

## 2017-03-08 PROCEDURE — 97112 NEUROMUSCULAR REEDUCATION: CPT | Mod: GP | Performed by: PHYSICAL THERAPIST

## 2017-03-08 NOTE — MR AVS SNAPSHOT
"              After Visit Summary   3/8/2017    Yusra Rivas    MRN: [id number]           Patient Information     Date Of Birth          1955        Visit Information        Provider Department      3/8/2017 2:30 PM Dheeraj Lara, PT [facility]        Today's Diagnoses     Chronic pain    -  1    Lumbar radiculopathy        Myofascial pain syndrome           Follow-ups after your visit        Your next 10 appointments already scheduled     Mar 14, 2017  2:00 PM CDT   Return Visit with Antoine Narvaez MD   [facility] ([facility])    [address] [address] [phone]            Aug 29, 2017  1:30 PM CDT   Return Visit with Sandra Arroyo MD   [facility]    [address] [place] [address] [phone]              Who to contact     If you have questions or need follow up information about today's clinic visit or your schedule please contact [facility] directly at [phone].  Normal or non-critical lab and imaging results will be communicated to you by [employer], letter or phone within 4 business days after the clinic has received the results. If you do not hear from us within 7 days, please contact the clinic through [employer] or phone. If you have a critical or abnormal lab result, we will notify you by phone as soon as possible.  Submit refill requests through [employer]edStore or call your pharmacy and they will forward the refill request to us. Please allow 3 business days for your refill to be completed.          Additional Information About Your Visit        [employer] Information     EmbedStore lets you send messages to your doctor, view your test results, renew your prescriptions, schedule appointments and more. To sign up, go to [email or web address] . Click on \"Log in\" on the left side of the screen, which will take you to the Welcome page. Then click on " "\"Sign up Now\" on the right side of the page.     You will be asked to enter the access code listed below, as well as some personal information. Please follow the directions to create your username and password.     Your access code is: [email or web address]  Expires: 4/10/2017  3:13 PM     Your access code will  in 90 days. If you need help or a new code, please call your [facility] or [phone].        Care EveryWhere ID     This is your Care EveryWhere ID. This could be used by other organizations to access your [place] medical records  [id number]         Blood Pressure from Last 3 Encounters:   17 114/66   01/10/17 118/59   16 106/61    Weight from Last 3 Encounters:   17 68 kg (150 lb)   01/10/17 68 kg (150 lb)   16 66.2 kg (146 lb)              We Performed the Following     NEUROMUSCULAR RE-EDUCATION     Therapeutic Procedure per 15 min        Primary Care Provider Office Phone # Fax #    Dzoxuosa Kamaljit AndersongianinDO [phone]       [address]   [facility] [address]        Thank you!     Thank you for choosing [facility]  for your care. Our goal is always to provide you with excellent care. Hearing back from our patients is one way we can continue to improve our services. Please take a few minutes to complete the written survey that you may receive in the mail after your visit with us. Thank you!             Your Updated Medication List - Protect others around you: Learn how to safely use, store and throw away your medicines at www.disposemymeds.org.          This list is accurate as of: 3/8/17  3:15 PM.  Always use your most recent med list.                   Brand Name Dispense Instructions for use    amoxicillin-clavulanate 125-31.25 MG/5ML suspension    AUGMENTIN     Take 45 mg/kg/day by mouth 2 times daily       aspirin 81 MG tablet     30 tablet    Take 1 tablet (81 mg) by mouth daily       cholecalciferol 1000 UNIT " tablet    vitamin D    200 tablet    TAKE TWO TABLETS BY MOUTH EVERY DAY       cyclobenzaprine 10 MG tablet    FLEXERIL    30 tablet    Take 1 tablet (10 mg) by mouth At Bedtime       * docusate sodium 100 MG tablet    COLACE    60 tablet    Take 100 mg by mouth daily       *  MG capsule   Generic drug:  docusate sodium     100 capsule    TAKE ONE CAPSULE BY MOUTH EVERY DAY       gabapentin 300 MG capsule    NEURONTIN    270 capsule    Increase the Gabapentin to 900 mg three time a day.       HYDROcodone-acetaminophen 5-325 MG per tablet    NORCO    75 tablet    Take 1 tablet by mouth every 6 hours as needed for pain Max of 3 tablets per day on bad days. This is a 30 day supply.  Dispense on/after 3/2/17. To start on 3/4/17       methocarbamol 500 MG tablet    ROBAXIN    90 tablet    Take 1 tablet (500 mg) by mouth 3 times daily as needed for muscle spasms       metoprolol 50 MG tablet    LOPRESSOR    180 tablet    TAKE ONE TABLET BY MOUTH TWICE A DAY       multivitamin Tabs per tablet     60 tablet    TAKE ONE TABLET BY MOUTH EVERY DAY       omeprazole 20 MG CR capsule    priLOSEC    90 capsule    TAKE ONE CAPSULE BY MOUTH EVERY DAY       polyethylene glycol powder    MIRALAX/GLYCOLAX    527 g    STIR 17 GM OF POWDER (SEE DEXTER INSIDE CAP) IN 8-OZ OF LIQUID UNTIL COMPLETELY DISSOLVED. DRINK THE SOLUTION DAILY OR AS DIRECTED.       zonisamide 25 MG capsule    Zonegran         * Notice:  This list has 2 medication(s) that are the same as other medications prescribed for you. Read the directions carefully, and ask your doctor or other care provider to review them with you.

## 2017-03-08 NOTE — PROGRESS NOTES
"Patient Name: Yusra Rivas       YOB: 1955      Medical Record Number: [id number]  Diagnosis:     Chronic pain  Lumbar radiculopathy  Myofascial pain syndrome       Visit Info   Length of Visit: 50  Arrived: On Time     Exercise/Activity   Education Instruction:  Postural Training/Anatomy  Pacing/Energy Conservation  Home Program  Self Care  Activity:  There Ex 30':  Aerobic Conditioning (*see \"Strength/Functional Actitivities Record for details of exercises performed)  Bike x 2', 1', 8' (rests were due to fatigue, not pain)  UBE x 8'  Jamison walk with cues for mindful walking, soft heel strike.  Cues for chin tuck helpful, otherwise tendency to slouch and position in fwd head posture  Stretching:  Upper Ext  bilateral, Lower Ext  Bilateral  Stretch routine with 'breathe, stabilize, move' emphasis  Postural Training:  static standing focus   Using mirror for feedback, performed scaption/arm raises 45-50 degree elevation     Neuro re-ed 15':  Standing mid-range heel raises, toe raises with cues for 50% speed.  Cues for engaging in body scan with re-emphasis on turning off power, overused muscles. Cues to exhale through mouth helpful, rafia at jaw.   Pt reports has listened to the Relaxation CD that was given to her from last Pt visit  Worked on neutral spine position with walking focus  Further ed and discussion on Self care and aerobic exercise    Notes:   Continues to slowly progress toward goals and slowly improving overall conditioning level.  Will continue to emphasize importance of HEP compliance to maximize pain management skills and ability.    Demonstrates/Verbalizes Technique:  3, 4 (1= poor technique-difficulty performing exercises,significant cues required; 5= good technique-performs exercises without cues)  Body Awareness: 3 (1=low awareness; 5=high awareness)  Posture/Stability: 3 (1= poor posture, stability; 5= good posture, stability)     Motivational Level:    Ask questions, Cooperative " and Distracted, in pain   Participation:  Full     Patient Satisfaction:  satisfied     Response to Teaching:    cooperative, needs reinforcement   Factors that affect learning: None     Plan of Care  Pt has follow up appt with Dr Sharri Narvaez on 3/14/17 and plans to discuss further PT at that time.    Recommend to continue PT to support reactivation and integration of self regulation pain management skills. Will continue to emphasize the importance of patient's compliance with HEP and self care efforts to maximize her pain management skills and strategies.    Therapist: Dheeraj Lara PT                 Date:  3/8/2017

## 2017-03-24 ENCOUNTER — TELEPHONE (OUTPATIENT)
Dept: FAMILY MEDICINE | Facility: OTHER | Age: 62
End: 2017-03-24

## 2017-03-24 DIAGNOSIS — R25.2 SPASM: ICD-10-CM

## 2017-03-24 DIAGNOSIS — M54.2 NECK PAIN: ICD-10-CM

## 2017-03-24 RX ORDER — HYDROCODONE BITARTRATE AND ACETAMINOPHEN 5; 325 MG/1; MG/1
1 TABLET ORAL EVERY 6 HOURS PRN
Qty: 75 TABLET | Refills: 0 | Status: SHIPPED | OUTPATIENT
Start: 2017-03-24 | End: 2017-04-28

## 2017-03-24 NOTE — TELEPHONE ENCOUNTER
Signed Prescriptions:                        Disp   Refills    HYDROcodone-acetaminophen (NORCO) 5-325 MG*75 tab*0        Sig: Take 1 tablet by mouth every 6 hours as needed for           pain Max of 3 tablets per day on bad days. This           is a 30 day supply.  Dispense on/after 3/31/17.           To start on 4/3/17  Authorizing Provider: KAROL SHRESTHA    Signed script placed in locked in top drawer of trigger point cart at [facility]    Karol STILL, RN CNP, FNP  [facility] [place]

## 2017-03-24 NOTE — TELEPHONE ENCOUNTER
Pt LM at 1339:    States that she would like a refill of hydrocodone 5/325 mg. Please mail to  pharmacy in [place].   ----------  Will route to MA pool for assistance with gathering opioid refill information.    MYLENE DobsonN, RN-BC  Patient Care Supervisor/Care Coordinator  [facility]

## 2017-03-24 NOTE — TELEPHONE ENCOUNTER
Panel Management Review      Patient has the following on her problem list: None      Composite cancer screening  Chart review shows that this patient is due/due soon for the following Pap Smear and Colonoscopy  Summary:    Patient is due/failing the following:   COLONOSCOPY, LDL and PAP    Action needed:   Patient needs office visit for blood work and colonoscopy.    Type of outreach:    Phone, spoke to patient.  she will schedule when she can    Questions for provider review:    None                                                                                                                                    Padma CROW       Chart routed to Care Team .

## 2017-03-24 NOTE — TELEPHONE ENCOUNTER
Medication refill information reviewed.     Due date for Norco is 4/3/2017    Prescriptions prepped for review.     Will route to provider.     Abril Culp RN, [facility]  Pain Clinic Care Coordinator

## 2017-03-24 NOTE — TELEPHONE ENCOUNTER
Received call from patient requesting refill(s) of HYDROcodone-acetaminophen (NORCO) 5-325 MG per tablet    Last picked up from pharmacy on 03/04/17    Pt last seen by prescribing provider on 01/10/17  Next appt scheduled for 04/28/17    Last urine drug screen date 11/30/16  Current opioid agreement on file (completed within the last year) Yes Date of opioid agreement: 11/30/16    Processing (pick one and delete the others):      Mail to  pharmacy   [address] [facility] [address]    Will route to nursing Lead for review and preparation of prescription(s).

## 2017-03-27 NOTE — TELEPHONE ENCOUNTER
Called patient. Script for NORCO was signed and mailed out to  pharmacy.   [address] [facility] [address]      Chauncye Flores MA

## 2017-04-28 ENCOUNTER — OFFICE VISIT (OUTPATIENT)
Dept: PALLIATIVE MEDICINE | Facility: CLINIC | Age: 62
End: 2017-04-28
Payer: MEDICARE

## 2017-04-28 VITALS
WEIGHT: 148 LBS | HEART RATE: 90 BPM | DIASTOLIC BLOOD PRESSURE: 66 MMHG | BODY MASS INDEX: 23.18 KG/M2 | SYSTOLIC BLOOD PRESSURE: 145 MMHG

## 2017-04-28 DIAGNOSIS — M54.2 NECK PAIN: ICD-10-CM

## 2017-04-28 DIAGNOSIS — M96.1 FAILED BACK SURGICAL SYNDROME: Primary | ICD-10-CM

## 2017-04-28 DIAGNOSIS — R25.2 SPASM: ICD-10-CM

## 2017-04-28 DIAGNOSIS — Z51.89 ENCOUNTER FOR OTHER SPECIFIED AFTERCARE: ICD-10-CM

## 2017-04-28 DIAGNOSIS — G89.4 CHRONIC PAIN SYNDROME: ICD-10-CM

## 2017-04-28 PROCEDURE — 99214 OFFICE O/P EST MOD 30 MIN: CPT | Performed by: ANESTHESIOLOGY

## 2017-04-28 RX ORDER — HYDROCODONE BITARTRATE AND ACETAMINOPHEN 5; 325 MG/1; MG/1
1 TABLET ORAL EVERY 6 HOURS PRN
Qty: 75 TABLET | Refills: 0 | Status: SHIPPED | OUTPATIENT
Start: 2017-04-28 | End: 2017-05-25

## 2017-04-28 RX ORDER — GABAPENTIN 300 MG/1
CAPSULE ORAL
Qty: 270 CAPSULE | Refills: 3 | Status: SHIPPED | OUTPATIENT
Start: 2017-04-28 | End: 2017-09-14

## 2017-04-28 ASSESSMENT — PAIN SCALES - GENERAL: PAINLEVEL: MODERATE PAIN (4)

## 2017-04-28 NOTE — PROGRESS NOTES
[facility]    Date of visit: 4/28/2017    Chief complaint:   Chief Complaint   Patient presents with     Pain       Interval history:  Yusra Rivas was last seen by me on 1/10/17.      Recommendations/plan at the last visit included:  Plan:  1.  Pain Physical therapy eval and treat - continue with physical therapy  2.  Pain Psychology eval and treat - continue to practice coping skills, relaxation techniques, biofeedback as instructed by Santo Adames LP  3.  Continue Gabapentin 900 mg three times per day for nerve related pain and myofascial pain  4.  Continue Norco 5/325 - maximum 3 times per day on bad days.   5.  Continue Flexeril as needed for muscle spasm and to help with sleep  6. Robaxin 750 mg TID prn spasm  7.  Continue Zonegran for headache  8.  Lumbar trigger point injections for myofascial pain - performed today and may be repeated as needed and may be double booked  9.  Continue home exercise    10.  Trigger point injections may be helpful for persistent muscle spasm - may be double booked  11. Discussed the possibility of spinal cord stimulation as a modality to decrease pain and improve function - patient given DVDs today  12.  Follow up 8-10 weeks    Since her last visit, Yusra Rivas reports:  Her pain has been pretty stable overall.  At her last visit we performed lumbar trigger point injections that helped to calm down the lower back pain.  Today, the worst of the pain is in the hips, buttocks, and thighs right greater than left and is described as a constant tightness.  She also has some aches and pains in the neck, thoracic spine, and occasionally toothache pains in the arms that can at times last two weeks, more often on the left.  She continues with muscle spasm in multiple areas on her body.  She had tried acupuncture but was not getting significant response to treatments.  Her pain is described as achy, burning, gnawing, nagging, miserable, exhausting, penetrating,  and tender.    Her pain is worse with over doing it and decreased with rest and medications.    Pain scores:  Pain intensity on average is 4 on a scale of 0-10. Ranges from 3/10 to 8/10    Current pain treatments:   Norco 5/325 one tab BID  Gabapentin 300 mg 3 capsules TID  Flexeril 10 mg at bedtime    Past pain treatments:  Indomethacin - GI problems  Percocet - no different than hydrocodone  Relafen - made heart race    Side Effects: denies any problems    Medications:  Current Outpatient Prescriptions   Medication Sig Dispense Refill     HYDROcodone-acetaminophen (NORCO) 5-325 MG per tablet Take 1 tablet by mouth every 6 hours as needed for pain Max of 3 tablets per day on bad days. This is a 30 day supply.  Dispense on/after 3/31/17. To start on 4/3/17 75 tablet 0     methocarbamol (ROBAXIN) 500 MG tablet Take 1 tablet (500 mg) by mouth 3 times daily as needed for muscle spasms 90 tablet 1     polyethylene glycol (MIRALAX/GLYCOLAX) powder STIR 17 GM OF POWDER (SEE DEXTER INSIDE CAP) IN 8-OZ OF LIQUID UNTIL COMPLETELY DISSOLVED. DRINK THE SOLUTION DAILY OR AS DIRECTED. 527 g 5     gabapentin (NEURONTIN) 300 MG capsule Increase the Gabapentin to 900 mg three time a day. 270 capsule 3     omeprazole (PRILOSEC) 20 MG capsule TAKE ONE CAPSULE BY MOUTH EVERY DAY 90 capsule 2     VITAMIN D3 1000 UNITS tablet TAKE TWO TABLETS BY MOUTH EVERY  tablet 3     cyclobenzaprine (FLEXERIL) 10 MG tablet Take 1 tablet (10 mg) by mouth At Bedtime 30 tablet 0     metoprolol (LOPRESSOR) 50 MG tablet TAKE ONE TABLET BY MOUTH TWICE A  tablet 3     zonisamide (ZONEGRAN) 25 MG capsule        Multiple Vitamins-Minerals (I-GOLDIE) TABS TAKE ONE TABLET BY MOUTH EVERY DAY 60 tablet 8     docusate sodium (COLACE) 100 MG tablet Take 100 mg by mouth daily 60 tablet 1     amoxicillin-clavulanate (AUGMENTIN) 125-31.25 MG/5ML suspension Take 45 mg/kg/day by mouth 2 times daily Reported on 4/28/2017        MG capsule TAKE ONE  CAPSULE BY MOUTH EVERY DAY (Patient not taking: Reported on 4/28/2017) 100 capsule 3     aspirin 81 MG tablet Take 1 tablet (81 mg) by mouth daily (Patient not taking: Reported on 4/28/2017) 30 tablet 11       Medical History: any changes in medical history since they were last seen? Yes - needle aspiration of thyroid    Review of Systems:  The 14 system ROS was reviewed from the intake questionnaire, and is positive for: headache, dizziness, vision changes, sinus infection, earache, edema, palpitations, stiffness, back pain, neck pain, pain on urination, weakness, numbness, tinglin, tremor, memory loss, and stress  Any bowel or bladder problems: denies changes  Mood: good    Physical Exam:  /66  Pulse 90  Wt 67.1 kg (148 lb)  BMI 23.18 kg/m2  Constitutional: alert and no distress.  Pt is well nourished, well developed  Head: Normocephalic. No masses, lesions, tenderness or abnormalities  ENT: EOMI, mucosal surfaces moist.  Neck with full ROM, posture fair  Cardiovascular: No edema or JVD appreciated.  Respiratory: Good diaphragmatic excursion. No wheezes appreciated.  Speaking in complete sentences without shortness of breath.  No accessory muscle use.   Musculoskeletal: extremities normal- no gross deformities noted, normal muscle tone and able to move about the exam room without difficulty.    Skin: no suspicious lesions or rashes appreciated on exposed areas  Neurologic: Gait normal, mildly forward flexed. Moving all extremities spontaneously, no apparent weakness.    Psychiatric: mentation appears normal, affect full and good eye contact.      Cervical Spine:  Good range of motion, exam limited  Lumbar Spine:  Tenderness of the lumbar paraspinal muscles and at the right SI joint.  Flexion limited to approximately 90 degrees.  Seated straight leg raise mildly positive on the right.    Assessment:   1.  Chronic pain syndrome  2.  Chronic post fusion lower back pain  3.  Diffuse myofascial pain (muscle  pain)  4.  Muscle spasm/dystonia  5.  History of closed head injury  6.  Lumbar radiculopathy (radiating pain)  7.  Depression/anxiety  8.  Right foot drop  9.  Proximal lower extremity weakness  10.  Chronic headache  11.  History of breast cancer, lung cancer     Plan:  1.  Pain Physical therapy eval and treat - continue with home exercise, self directed exercise  2.  Pain Psychology eval and treat - Santo Adames LP for psychological eval for spinal cord stimulation  3.  Continue Gabapentin 900 mg three times per day for nerve related pain and myofascial pain  4.  Continue Norco 5/325 - maximum 3 times per day on bad days.   5.  Continue Flexeril as needed for muscle spasm and to help with sleep  6. Robaxin 750 mg TID prn spasm  7.  Continue Zonegran for headache  8.  Trigger point injections for myofascial pain - may be double booked  9.  Will move forward with spinal cord stimulation as a modality to decrease pain and improve function - patient given DVDs today  10.  Follow up 8-10 weeks      Total time spent was 30 minutes, and more than 50% of face to face time was spent in counseling and/or coordination of care regarding regular exercise, medication management, in depth discussion of spinal cord stimulation, and interventional procedures to decrease pain and improve function.

## 2017-04-28 NOTE — NURSING NOTE
"Chief Complaint   Patient presents with     Pain       Initial There were no vitals taken for this visit. Estimated body mass index is 23.49 kg/(m^2) as calculated from the following:    Height as of 2/28/17: 1.702 m (5' 7\").    Weight as of 2/28/17: 68 kg (150 lb).  Medication Reconciliation: ramo Kinsey CMA (AAMA)      "

## 2017-04-28 NOTE — TELEPHONE ENCOUNTER
Received fax request from [facility] requesting refill(s) for Gabapentin    Last refilled on 03/15/17    Pt last seen on 01/10/17  Next appt scheduled for 04/28/17    Will facilitate refill.

## 2017-04-28 NOTE — PATIENT INSTRUCTIONS
Assessment:   1.  Chronic pain syndrome  2.  Chronic post fusion lower back pain  3.  Diffuse myofascial pain (muscle pain)  4.  Muscle spasm/dystonia  5.  History of closed head injury  6.  Lumbar radiculopathy (radiating pain)  7.  Depression/anxiety  8.  Right foot drop  9.  Proximal lower extremity weakness  10.  Chronic headache  11.  History of breast cancer, lung cancer     Plan:  1.  Pain Physical therapy eval and treat - continue with home exercise, self directed exercise  2.  Pain Psychology eval and treat - Santo Adames LP for psychological eval for spinal cord stimulation  3.  Continue Gabapentin 900 mg three times per day for nerve related pain and myofascial pain  4.  Continue Norco 5/325 - maximum 3 times per day on bad days.   5.  Continue Flexeril as needed for muscle spasm and to help with sleep  6. Robaxin 750 mg TID prn spasm  7.  Continue Zonegran for headache  8.  Trigger point injections for myofascial pain - may be double booked  9.  Will move forward with spinal cord stimulation as a modality to decrease pain and improve function - patient given DVDs today  10.  Follow up 8-10 weeks      ----------------------------------------------------------------  Nurse Triage line:  [phone]   Call this number with any questions or concerns. You may leave a detailed message anytime. Calls are typically returned Monday through Friday between 8 AM and 4:30 PM. We usually get back to you within 2 business days depending on the issue/request.       Medication refills:    For non-narcotic medications, call your pharmacy directly to request a refill. The pharmacy will contact the [facility] Management Center for authorization. Please allow 3-4 days for these refills to be processed.     For narcotic refills, call the nurse triage line or send a [employer] message. Please contact us 7-10 days before your refill is due. The message MUST include the name of the specific medication(s) requested and how you would  like to receive the prescription(s). The options are as follows:    Pain Clinic staff can mail the prescription to your pharmacy. Please tell us the name of the pharmacy.    You may pick the prescription up at the Pain Clinic (tell us the location) or during a clinic visit with your pain provider    Pain Clinic staff can deliver the prescription to the [facility] pharmacy in the clinic building. Please tell us the location.      Scheduling number: [phone].  Call this number to schedule or change appointments.    We believe regular attendance is key to your success in our program.    Any time you are unable to keep your appointment we ask that you call us at least 24 hours in advance to let us know. This will allow us to offer the appointment time to another patient.

## 2017-04-28 NOTE — TELEPHONE ENCOUNTER
Signed Prescriptions:                        Disp   Refills    gabapentin (NEURONTIN) 300 MG capsule      270 ca*3        Sig: Increase the Gabapentin to 900 mg three time a day.  Authorizing Provider: ANTOINE SU    Reviewed, signed, and e-prescribed.    Antoine Narvaez MD  [facility]

## 2017-04-28 NOTE — MR AVS SNAPSHOT
After Visit Summary   4/28/2017    Yusra Rivas    MRN: [id number]           Patient Information     Date Of Birth          1955        Visit Information        Provider Department      4/28/2017 4:00 PM Antoine Ibrahim MD [facility]        Today's Diagnoses     Spasm-trapezius         Neck Pain-right occipital pain from fall           Care Instructions    Assessment:   1.  Chronic pain syndrome  2.  Chronic post fusion lower back pain  3.  Diffuse myofascial pain (muscle pain)  4.  Muscle spasm/dystonia  5.  History of closed head injury  6.  Lumbar radiculopathy (radiating pain)  7.  Depression/anxiety  8.  Right foot drop  9.  Proximal lower extremity weakness  10.  Chronic headache  11.  History of breast cancer, lung cancer     Plan:  1.  Pain Physical therapy eval and treat - continue with home exercise, self directed exercise  2.  Pain Psychology eval and treat - Santo Adames LP for psychological eval for spinal cord stimulation  3.  Continue Gabapentin 900 mg three times per day for nerve related pain and myofascial pain  4.  Continue Norco 5/325 - maximum 3 times per day on bad days.   5.  Continue Flexeril as needed for muscle spasm and to help with sleep  6. Robaxin 750 mg TID prn spasm  7.  Continue Zonegran for headache  8.  Trigger point injections for myofascial pain - may be double booked  9.  Will move forward with spinal cord stimulation as a modality to decrease pain and improve function - patient given DVDs today  10.  Follow up 8-10 weeks      ----------------------------------------------------------------  Nurse Triage line:  [phone]   Call this number with any questions or concerns. You may leave a detailed message anytime. Calls are typically returned Monday through Friday between 8 AM and 4:30 PM. We usually get back to you within 2 business days depending on the issue/request.       Medication refills:    For non-narcotic medications, call  your pharmacy directly to request a refill. The pharmacy will contact the [facility] Management Center for authorization. Please allow 3-4 days for these refills to be processed.     For narcotic refills, call the nurse triage line or send a [employer]t message. Please contact us 7-10 days before your refill is due. The message MUST include the name of the specific medication(s) requested and how you would like to receive the prescription(s). The options are as follows:    Pain Clinic staff can mail the prescription to your pharmacy. Please tell us the name of the pharmacy.    You may pick the prescription up at the Pain Clinic (tell us the location) or during a clinic visit with your pain provider    Pain Clinic staff can deliver the prescription to the [facility] pharmacy in the clinic building. Please tell us the location.      Scheduling number: [phone].  Call this number to schedule or change appointments.    We believe regular attendance is key to your success in our program.    Any time you are unable to keep your appointment we ask that you call us at least 24 hours in advance to let us know. This will allow us to offer the appointment time to another patient.             Follow-ups after your visit        Your next 10 appointments already scheduled     Aug 29, 2017  1:30 PM CDT   [facility]    [address] [place] [id number] [phone]              Who to contact     If you have questions or need follow up information about today's clinic visit or your schedule please contact [facility] directly at [phone].  Normal or non-critical lab and imaging results will be communicated to you by MyChart, letter or phone within 4 business days after the clinic has received the results. If you do not hear from us within 7 days, please contact the clinic through MyChart or phone. If you have a critical or  "abnormal lab result, we will notify you by phone as soon as possible.  Submit refill requests through [employer] or call your pharmacy and they will forward the refill request to us. Please allow 3 business days for your refill to be completed.          Additional Information About Your Visit        [employer] Information     [employer] lets you send messages to your doctor, view your test results, renew your prescriptions, schedule appointments and more. To sign up, go to [email or web address] . Click on \"Log in\" on the left side of the screen, which will take you to the Welcome page. Then click on \"Sign up Now\" on the right side of the page.     You will be asked to enter the access code listed below, as well as some personal information. Please follow the directions to create your username and password.     Your access code is: [email or web address]  Expires: 2017  4:47 PM     Your access code will  in 90 days. If you need help or a new code, please call your [place] clinic or [phone].        Care EveryWhere ID     This is your Care EveryWhere ID. This could be used by other organizations to access your [place] medical records  [id number]        Your Vitals Were     Pulse BMI (Body Mass Index)                90 23.18 kg/m2           Blood Pressure from Last 3 Encounters:   17 145/66   17 114/66   01/10/17 118/59    Weight from Last 3 Encounters:   17 67.1 kg (148 lb)   17 68 kg (150 lb)   01/10/17 68 kg (150 lb)              Today, you had the following     No orders found for display         Today's Medication Changes          These changes are accurate as of: 17  4:47 PM.  If you have any questions, ask your nurse or doctor.               These medicines have changed or have updated prescriptions.        Dose/Directions    HYDROcodone-acetaminophen 5-325 MG per tablet   Commonly known as:  NORCO   This may have changed:  additional instructions   Used for:  Spasm, Neck pain "   Changed by:  Antoine Ibrahim MD        Dose:  1 tablet   Take 1 tablet by mouth every 6 hours as needed for pain Max of 3 tablets per day on bad days. This is a 30 day supply.  Dispense on/after 5/1/17. To start on 5/3/17   Quantity:  75 tablet   Refills:  0            Where to get your medicines      Some of these will need a paper prescription and others can be bought over the counter.  Ask your nurse if you have questions.     Bring a paper prescription for each of these medications     HYDROcodone-acetaminophen 5-325 MG per tablet                Primary Care Provider Office Phone # Fax #    Munir Lynn [phone]       [address]   [facility] [address]        Thank you!     Thank you for choosing [facility]  for your care. Our goal is always to provide you with excellent care. Hearing back from our patients is one way we can continue to improve our services. Please take a few minutes to complete the written survey that you may receive in the mail after your visit with us. Thank you!             Your Updated Medication List - Protect others around you: Learn how to safely use, store and throw away your medicines at www.disposemymeds.org.          This list is accurate as of: 4/28/17  4:47 PM.  Always use your most recent med list.                   Brand Name Dispense Instructions for use    amoxicillin-clavulanate 125-31.25 MG/5ML suspension    AUGMENTIN     Take 45 mg/kg/day by mouth 2 times daily Reported on 4/28/2017       aspirin 81 MG tablet     30 tablet    Take 1 tablet (81 mg) by mouth daily       cholecalciferol 1000 UNIT tablet    vitamin D    200 tablet    TAKE TWO TABLETS BY MOUTH EVERY DAY       cyclobenzaprine 10 MG tablet    FLEXERIL    30 tablet    Take 1 tablet (10 mg) by mouth At Bedtime       * docusate sodium 100 MG tablet    COLACE    60 tablet    Take 100 mg by mouth daily       *  MG capsule   Generic  drug:  docusate sodium     100 capsule    TAKE ONE CAPSULE BY MOUTH EVERY DAY       gabapentin 300 MG capsule    NEURONTIN    270 capsule    Increase the Gabapentin to 900 mg three time a day.       HYDROcodone-acetaminophen 5-325 MG per tablet    NORCO    75 tablet    Take 1 tablet by mouth every 6 hours as needed for pain Max of 3 tablets per day on bad days. This is a 30 day supply.  Dispense on/after 5/1/17. To start on 5/3/17       methocarbamol 500 MG tablet    ROBAXIN    90 tablet    Take 1 tablet (500 mg) by mouth 3 times daily as needed for muscle spasms       metoprolol 50 MG tablet    LOPRESSOR    180 tablet    TAKE ONE TABLET BY MOUTH TWICE A DAY       multivitamin Tabs per tablet     60 tablet    TAKE ONE TABLET BY MOUTH EVERY DAY       omeprazole 20 MG CR capsule    priLOSEC    90 capsule    TAKE ONE CAPSULE BY MOUTH EVERY DAY       polyethylene glycol powder    MIRALAX/GLYCOLAX    527 g    STIR 17 GM OF POWDER (SEE DEXTER INSIDE CAP) IN 8-OZ OF LIQUID UNTIL COMPLETELY DISSOLVED. DRINK THE SOLUTION DAILY OR AS DIRECTED.       zonisamide 25 MG capsule    Zonegran         * Notice:  This list has 2 medication(s) that are the same as other medications prescribed for you. Read the directions carefully, and ask your doctor or other care provider to review them with you.

## 2017-05-04 ENCOUNTER — TRANSFERRED RECORDS (OUTPATIENT)
Dept: HEALTH INFORMATION MANAGEMENT | Facility: CLINIC | Age: 62
End: 2017-05-04

## 2017-05-22 ENCOUNTER — TELEPHONE (OUTPATIENT)
Dept: INTERNAL MEDICINE | Facility: CLINIC | Age: 62
End: 2017-05-22

## 2017-05-22 NOTE — TELEPHONE ENCOUNTER
Panel Management Review      Patient has the following on her problem list: None      Composite cancer screening  Chart review shows that this patient is due/due soon for the following Pap Smear and Colonoscopy  Summary:    Patient is due/failing the following:   COLONOSCOPY, LDL and PAP    Action needed:   Patient needs office visit for physical.    Type of outreach:    Phone, spoke to patient.  she will call back to schedule    Questions for provider review:    None                                                                                                                                    Padma CROW       Chart routed to Care Team .

## 2017-05-25 DIAGNOSIS — R25.2 SPASM: ICD-10-CM

## 2017-05-25 DIAGNOSIS — M54.2 NECK PAIN: ICD-10-CM

## 2017-05-25 RX ORDER — HYDROCODONE BITARTRATE AND ACETAMINOPHEN 5; 325 MG/1; MG/1
1 TABLET ORAL EVERY 6 HOURS PRN
Qty: 75 TABLET | Refills: 0 | Status: SHIPPED | OUTPATIENT
Start: 2017-05-25 | End: 2017-06-27

## 2017-05-25 NOTE — TELEPHONE ENCOUNTER
Received call from patient requesting refill(s) of HYDROcodone-acetaminophen (NORCO) 5-325 MG per tablet     Last picked up from pharmacy on 5/1/17    Pt last seen by prescribing provider on 4/28/17  Next appt scheduled for 7/7/17    Last urine drug screen date 11/30/16  Current opioid agreement on file (completed within the last year) Yes Date of opioid agreement: 1/18/17    Processing (pick one)     Mail it to [facility] in [place].     Will route to nursing pool for review and preparation of prescription(s).

## 2017-05-25 NOTE — TELEPHONE ENCOUNTER
Medication refill information reviewed.     Due date for Norco is 6/2/2017    Prescriptions prepped for review.     Will route to provider.     Abril Culp RN, [facility]  Pain Clinic Care Coordinator

## 2017-05-25 NOTE — TELEPHONE ENCOUNTER
Signed Prescriptions:                        Disp   Refills    HYDROcodone-acetaminophen (NORCO) 5-325 MG*75 tab*0        Sig: Take 1 tablet by mouth every 6 hours as needed for           pain Max of 3 tablets per day on bad days. This           is a 30 day supply.  Dispense on/after 5/31/17.           To start on 6/2/17  Authorizing Provider: ANTOINE SU    Reviewed, printed, and signed in [facility].  Placed in top drawer of med cart.    Antoine Navraez MD  [facility]

## 2017-05-25 NOTE — TELEPHONE ENCOUNTER
Pt LM at 1117 on 5/25 --    Reason for call:  Medication   If this is a refill request, has the caller requested the refill from the pharmacy already? No  Will the patient be using a [facility]? N/A  Name of the pharmacy and phone number for the current request: N/A    Name of the medication requested: HYDROcodone-acetaminophen (NORCO) 5-325 MG per tablet    Other request: Pt did not state if she wanted it mailed to pharmacy or would . Please call.     Phone number to reach patient:  Home number on file [phone] (home)    Best Time:  N/A    Can we leave a detailed message on this number?  YES     Ainsley Sky    [facility]

## 2017-05-25 NOTE — TELEPHONE ENCOUNTER
VM left today at: 12:57 pm    Patient would like the Hydrocodone sent to [facility] in [place].     Melida ROCA    [facility]

## 2017-06-27 DIAGNOSIS — M54.2 NECK PAIN: ICD-10-CM

## 2017-06-27 DIAGNOSIS — R25.2 SPASM: ICD-10-CM

## 2017-06-27 NOTE — TELEPHONE ENCOUNTER
Medication refill information reviewed.     Last due:  Dispense on/after 5/31/17. To start on 6/2/17    Due date:  7/2/17    Weaning instructions:  N/A    Prescriptions prepped for review.     Angélica Benites RN-BSN  [facility]

## 2017-06-27 NOTE — TELEPHONE ENCOUNTER
Received call from patient requesting refill(s) of HYDROcodone-acetaminophen (NORCO) 5-325 MG per tablet    Last picked up from pharmacy on 06/01/17    Pt last seen by prescribing provider on 04/28/17  Next appt scheduled for 07/07/17    Last urine drug screen date 11/30/16  Current opioid agreement on file (completed within the last year) Yes Date of opioid agreement: 12/21/16    Processing (pick one and delete the others):      Mail to [facility]   [address] [facility] [address]    Will route to nursing [place] for review and preparation of prescription(s).

## 2017-06-27 NOTE — TELEPHONE ENCOUNTER
VM left today at: 1:32 pm      Reason for call:  Medication   If this is a refill request, has the caller requested the refill from the pharmacy already? N/A  Will the patient be using a [facility]? Yes  Name of the pharmacy and phone number for the current request: [facility]    Name of the medication requested: Hydrocodone    Phone number to reach patient:  Home number on file [phone] (home)      Can we leave a detailed message on this number?  YES     Melida ROCA    [facility]

## 2017-06-28 RX ORDER — HYDROCODONE BITARTRATE AND ACETAMINOPHEN 5; 325 MG/1; MG/1
1 TABLET ORAL EVERY 6 HOURS PRN
Qty: 75 TABLET | Refills: 0 | Status: SHIPPED | OUTPATIENT
Start: 2017-06-28 | End: 2017-07-27

## 2017-06-28 NOTE — TELEPHONE ENCOUNTER
Signed Prescriptions:                        Disp   Refills    HYDROcodone-acetaminophen (NORCO) 5-325 MG*75 tab*0        Sig: Take 1 tablet by mouth every 6 hours as needed for           pain Max of 3 tablets per day on bad days. This           is a 30 day supply.  Dispense on/after 6/30/17.           To start on 7/2/17  Authorizing Provider: ANTOINE SU    Reviewed, printed, and signed in [facility].  Placed in MA basket.    Antoine HOGUE. Sharri Narvaez MD  [facility]

## 2017-06-28 NOTE — TELEPHONE ENCOUNTER
Informed patient. Script for Norco was signed and mailed out to-     [facility]   [address] [facility] [facility] [address]        Chauncey Flores MA

## 2017-07-07 ENCOUNTER — OFFICE VISIT (OUTPATIENT)
Dept: PALLIATIVE MEDICINE | Facility: CLINIC | Age: 62
End: 2017-07-07
Payer: MEDICARE

## 2017-07-07 VITALS
DIASTOLIC BLOOD PRESSURE: 73 MMHG | SYSTOLIC BLOOD PRESSURE: 121 MMHG | BODY MASS INDEX: 23.7 KG/M2 | HEART RATE: 69 BPM | HEIGHT: 67 IN | WEIGHT: 151 LBS

## 2017-07-07 DIAGNOSIS — M53.3 SI (SACROILIAC) JOINT DYSFUNCTION: ICD-10-CM

## 2017-07-07 DIAGNOSIS — M96.1 FAILED BACK SURGICAL SYNDROME: ICD-10-CM

## 2017-07-07 DIAGNOSIS — G89.4 CHRONIC PAIN SYNDROME: Primary | ICD-10-CM

## 2017-07-07 DIAGNOSIS — M47.817 LUMBOSACRAL FACET JOINT SYNDROME: ICD-10-CM

## 2017-07-07 PROCEDURE — 99214 OFFICE O/P EST MOD 30 MIN: CPT | Performed by: ANESTHESIOLOGY

## 2017-07-07 ASSESSMENT — PAIN SCALES - GENERAL: PAINLEVEL: MODERATE PAIN (5)

## 2017-07-07 NOTE — PROGRESS NOTES
[facility]    Date of visit: 7/7/2017    Chief complaint:   Chief Complaint   Patient presents with     Pain       Interval history:  Yusra Rivas was last seen by me on 4/28/17.      Recommendations/plan at the last visit included:  Plan:  1.  Pain Physical therapy eval and treat - continue with home exercise, self directed exercise  2.  Pain Psychology eval and treat - [facility] for psychological eval for spinal cord stimulation  3.  Continue Gabapentin 900 mg three times per day for nerve related pain and myofascial pain  4.  Continue Norco 5/325 - maximum 3 times per day on bad days.   5.  Continue Flexeril as needed for muscle spasm and to help with sleep  6. Robaxin 750 mg TID prn spasm  7.  Continue Zonegran for headache  8.  Trigger point injections for myofascial pain - may be double booked  9.  Will move forward with spinal cord stimulation as a modality to decrease pain and improve function - patient given DVDs today  10.  Follow up 8-10 weeks    Since her last visit, Yusra Rivas reports:  She had a pretty bad last couple of weeks due to increased right lower back and buttock pain with pain down the back of the right leg associated with spasms.  She states that her pain is better today that it has been in a while.  She states that the Robaxin doesn't help when her pain is really bad but on normal days it seems to help.      The pain was worse in the back and buttock than down the leg.  She states that the pain would go all the way into the foot.  She does have some numbness and tingling.  She felt that the upper leg was weak and felt like the leg would give out on occasion.  She states that the pain was so bad that even her pain medications wouldn't help.    Her pain is described as unbearable, tight, spasm, shooting and was constant.  hedr pain was worse with walking, movement, rolling over in bed and decreased with rest, lying down, and medications.    Pain  scores:  Pain intensity on average is 5 on a scale of 0-10. 4/10 to 10/10    Current pain treatments:   Norco 5/325 one tab BID  Gabapentin 300 mg 3 capsules TID  Flexeril 10 mg at bedtime  Robaxin 750 mg TID  Zonegran 25 mg QHS    Past pain treatments:  Indomethacin - GI problems  Percocet - no different than hydrocodone  Relafen - made heart race  Norco 5/325 one tab BID  Gabapentin 300 mg 3 capsules TID  Flexeril 10 mg at bedtime  Robaxin 750 mg TID  Zonegran 25 mg QHS    Side Effects: no side effect    Medications:  Current Outpatient Prescriptions   Medication Sig Dispense Refill     HYDROcodone-acetaminophen (NORCO) 5-325 MG per tablet Take 1 tablet by mouth every 6 hours as needed for pain Max of 3 tablets per day on bad days. This is a 30 day supply.  Dispense on/after 6/30/17. To start on 7/2/17 75 tablet 0     metoprolol (LOPRESSOR) 50 MG tablet TAKE ONE TABLET BY MOUTH TWICE A  tablet 2     gabapentin (NEURONTIN) 300 MG capsule Increase the Gabapentin to 900 mg three time a day. 270 capsule 3     amoxicillin-clavulanate (AUGMENTIN) 125-31.25 MG/5ML suspension Take 45 mg/kg/day by mouth 2 times daily Reported on 4/28/2017       methocarbamol (ROBAXIN) 500 MG tablet Take 1 tablet (500 mg) by mouth 3 times daily as needed for muscle spasms 90 tablet 1     polyethylene glycol (MIRALAX/GLYCOLAX) powder STIR 17 GM OF POWDER (SEE DEXTER INSIDE CAP) IN 8-OZ OF LIQUID UNTIL COMPLETELY DISSOLVED. DRINK THE SOLUTION DAILY OR AS DIRECTED. 527 g 5     omeprazole (PRILOSEC) 20 MG capsule TAKE ONE CAPSULE BY MOUTH EVERY DAY 90 capsule 2     VITAMIN D3 1000 UNITS tablet TAKE TWO TABLETS BY MOUTH EVERY  tablet 3     cyclobenzaprine (FLEXERIL) 10 MG tablet Take 1 tablet (10 mg) by mouth At Bedtime 30 tablet 0     zonisamide (ZONEGRAN) 25 MG capsule        Multiple Vitamins-Minerals (I-GOLDIE) TABS TAKE ONE TABLET BY MOUTH EVERY DAY 60 tablet 8     docusate sodium (COLACE) 100 MG tablet Take 100 mg by mouth daily  "60 tablet 1      MG capsule TAKE ONE CAPSULE BY MOUTH EVERY DAY (Patient not taking: Reported on 4/28/2017) 100 capsule 3     aspirin 81 MG tablet Take 1 tablet (81 mg) by mouth daily (Patient not taking: Reported on 4/28/2017) 30 tablet 11       Medical History: any changes in medical history since they were last seen? No    Review of Systems:  The 14 system ROS was reviewed from the intake questionnaire, and is positive for: vision changes, palpitations, stiffness, back pain, neck pain, pain on urination, weakness, numbness, tingling, tremor  Any bowel or bladder problems: denies changes  Mood: good    Physical Exam:  /73  Pulse 69  Ht 1.702 m (5' 7\")  Wt 68.5 kg (151 lb)  BMI 23.65 kg/m2  Constitutional: alert and no distress.  Pt is well nourished, well developed  Head: Normocephalic. No masses, lesions, tenderness or abnormalities  ENT: EOMI, mucosal surfaces moist.  Neck with full ROM, posture fair  Cardiovascular: No edema or JVD appreciated.  Respiratory: Good diaphragmatic excursion. No wheezes appreciated.  Speaking in complete sentences without shortness of breath.  No accessory muscle use.   Musculoskeletal: extremities normal- no gross deformities noted, normal muscle tone and able to move about the exam room without difficulty.    Skin: no suspicious lesions or rashes appreciated on exposed areas  Neurologic: Gait forward flexed, mildly favors the right lower extremity. Moving all extremities spontaneously, no apparent weakness.    Psychiatric: mentation appears normal, affect full and good eye contact.      Cervical Spine:  Good range of motion  Lumbar Spine:  Tender to palpation at the right lower lumbar facet joints, paraspinal muscles, and sacroiliac joint, right greater than left.  Flexes less than 90 degrees with pulling in the lower back.  Facet loading is positive on the right, negative on the left, seated straight leg raise is equivocal, hamstrings are very " tight    Assessment:   1.  Chronic pain syndrome  2.  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  Diffuse myofascial pain (muscle pain)  4.  Muscle spasm/dystonia  5.  History of closed head injury  6.  Lumbar radiculopathy (radiating pain)  7.  Depression/anxiety  8.  Right foot drop  9.  Proximal lower extremity weakness  10.  Chronic headache  11.  History of breast cancer, lung cancer      Plan:  1.  Pain Physical therapy eval and treat - continue with home exercise, self directed exercise  2.  Pain Psychology eval and treat - psychological eval for spinal cord stimulation  3.  Continue Gabapentin 900 mg three times per day for nerve related pain and myofascial pain  4.  Continue Norco 5/325 - maximum 3 times per day on bad days.   5.  Continue Flexeril as needed for muscle spasm and to help with sleep  6. Robaxin 750 mg TID prn spasm  7.  Continue Zonegran for headache  8.  Trigger point injections for myofascial pain performed today - see procedure note - may be double booked in the future  9.  Would benefit from L5-S1 facet injection and sacroiliac joint injection on the right  10.  Will move forward with spinal cord stimulation as a modality to decrease pain and improve function - patient given DVDs today  11.  Follow up 8-10 weeks      Total time spent was 30 minutes, and more than 50% of face to face time was spent in counseling and/or coordination of care regarding home exercise, medication management, and interventional procedures to decrease pain and improve function.

## 2017-07-07 NOTE — PATIENT INSTRUCTIONS
Assessment:   1.  Chronic pain syndrome  2.  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  Diffuse myofascial pain (muscle pain)  4.  Muscle spasm/dystonia  5.  History of closed head injury  6.  Lumbar radiculopathy (radiating pain)  7.  Depression/anxiety  8.  Right foot drop  9.  Proximal lower extremity weakness  10.  Chronic headache  11.  History of breast cancer, lung cancer      Plan:  1.  Pain Physical therapy eval and treat - continue with home exercise, self directed exercise  2.  Pain Psychology eval and treat - psychological eval for spinal cord stimulation  3.  Continue Gabapentin 900 mg three times per day for nerve related pain and myofascial pain  4.  Continue Norco 5/325 - maximum 3 times per day on bad days.   5.  Continue Flexeril as needed for muscle spasm and to help with sleep  6. Robaxin 750 mg TID prn spasm  7.  Continue Zonegran for headache  8.  Trigger point injections for myofascial pain - may be double booked  9.  Would benefit from L5-S1 facet injection and sacroiliac joint injection on the right  10.  Will move forward with spinal cord stimulation as a modality to decrease pain and improve function - patient given DVDs today  11.  Follow up 8-10 weeks    [facility]   Post Procedure Instructions    Today you had:  trigger point injections  - right lumbar     Medications used: bupivicaine   kenolog       Monitor the injection sites for signs and symptoms of infection-fever, chills, redness, swelling, warmth, or drainage to areas.    You may have soreness at injection sites for up to 24 hours.    You may apply ice to the painful areas to help minimize the discomfort of the needle pokes.    Do not apply heat to sites for at least 12 hours.    You may use anti-inflammatory medications or Tylenol for pain control if necessary  Nurse line: [phone]  After hours provider line: [phone]  Appointment line:  [phone]      ----------------------------------------------------------------  Nurse Triage line:  [phone]   Call this number with any questions or concerns. You may leave a detailed message anytime. Calls are typically returned Monday through Friday between 8 AM and 4:30 PM. We usually get back to you within 2 business days depending on the issue/request.       Medication refills:    For non-narcotic medications, call your pharmacy directly to request a refill. The pharmacy will contact the [facility] Management Center for authorization. Please allow 3-4 days for these refills to be processed.     For narcotic refills, call the nurse triage line or send a [employer] message. Please contact us 7-10 days before your refill is due. The message MUST include the name of the specific medication(s) requested and how you would like to receive the prescription(s). The options are as follows:    Pain Clinic staff can mail the prescription to your pharmacy. Please tell us the name of the pharmacy.    You may pick the prescription up at the Pain Clinic (tell us the location) or during a clinic visit with your pain provider    Pain Clinic staff can deliver the prescription to the [facility] pharmacy in the clinic building. Please tell us the location.      Scheduling number: [phone].  Call this number to schedule or change appointments.    We believe regular attendance is key to your success in our program.    Any time you are unable to keep your appointment we ask that you call us at least 24 hours in advance to let us know. This will allow us to offer the appointment time to another patient.

## 2017-07-07 NOTE — MR AVS SNAPSHOT
After Visit Summary   7/7/2017    Yusra Rivas    MRN: [id number]           Patient Information     Date Of Birth          1955        Visit Information        Provider Department      7/7/2017 3:00 PM Antoine Ibrahim MD [facility]        Today's Diagnoses     Chronic pain syndrome    -  1    Failed back surgical syndrome        Lumbosacral facet joint syndrome        SI (sacroiliac) joint dysfunction          Care Instructions    Assessment:   1.  Chronic pain syndrome  2.  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  Diffuse myofascial pain (muscle pain)  4.  Muscle spasm/dystonia  5.  History of closed head injury  6.  Lumbar radiculopathy (radiating pain)  7.  Depression/anxiety  8.  Right foot drop  9.  Proximal lower extremity weakness  10.  Chronic headache  11.  History of breast cancer, lung cancer      Plan:  1.  Pain Physical therapy eval and treat - continue with home exercise, self directed exercise  2.  Pain Psychology eval and treat - psychological eval for spinal cord stimulation  3.  Continue Gabapentin 900 mg three times per day for nerve related pain and myofascial pain  4.  Continue Norco 5/325 - maximum 3 times per day on bad days.   5.  Continue Flexeril as needed for muscle spasm and to help with sleep  6. Robaxin 750 mg TID prn spasm  7.  Continue Zonegran for headache  8.  Trigger point injections for myofascial pain - may be double booked  9.  Would benefit from L5-S1 facet injection and sacroiliac joint injection on the right  10.  Will move forward with spinal cord stimulation as a modality to decrease pain and improve function - patient given DVDs today  11.  Follow up 8-10 weeks    [facility]   Post Procedure Instructions    Today you had:  trigger point injections  - right lumbar     Medications used: bupivicaine   kenolog       Monitor the injection sites for signs and symptoms of infection-fever, chills,  redness, swelling, warmth, or drainage to areas.    You may have soreness at injection sites for up to 24 hours.    You may apply ice to the painful areas to help minimize the discomfort of the needle pokes.    Do not apply heat to sites for at least 12 hours.    You may use anti-inflammatory medications or Tylenol for pain control if necessary  Nurse line: [phone]  After hours provider line: [phone]  Appointment line: [phone]      ----------------------------------------------------------------  Nurse Triage line:  [phone]   Call this number with any questions or concerns. You may leave a detailed message anytime. Calls are typically returned Monday through Friday between 8 AM and 4:30 PM. We usually get back to you within 2 business days depending on the issue/request.       Medication refills:    For non-narcotic medications, call your pharmacy directly to request a refill. The pharmacy will contact the [facility] Center for authorization. Please allow 3-4 days for these refills to be processed.     For narcotic refills, call the nurse triage line or send a [employer] message. Please contact us 7-10 days before your refill is due. The message MUST include the name of the specific medication(s) requested and how you would like to receive the prescription(s). The options are as follows:    Pain Clinic staff can mail the prescription to your pharmacy. Please tell us the name of the pharmacy.    You may pick the prescription up at the Pain Clinic (tell us the location) or during a clinic visit with your pain provider    Pain Clinic staff can deliver the prescription to the [facility] pharmacy in the clinic building. Please tell us the location.      Scheduling number: [phone].  Call this number to schedule or change appointments.    We believe regular attendance is key to your success in our program.    Any time you are unable to keep your appointment we ask that you call us at least 24 hours  "in advance to let us know. This will allow us to offer the appointment time to another patient.               Follow-ups after your visit        Additional Services     PAIN BEHAVIORAL EVAL/TREAT/FOLLOW UP           PAIN INJECTION EVAL/TREAT/FOLLOW UP                 Your next 10 appointments already scheduled     Aug 29, 2017  1:30 PM CDT   Return Visit with Sandra Arroyo MD   [facility] ([facility]    [address] [place] [id number] [phone]              Who to contact     If you have questions or need follow up information about today's clinic visit or your schedule please contact [facility] directly at [phone].  Normal or non-critical lab and imaging results will be communicated to you by MyChart, letter or phone within 4 business days after the clinic has received the results. If you do not hear from us within 7 days, please contact the clinic through MyChart or phone. If you have a critical or abnormal lab result, we will notify you by phone as soon as possible.  Submit refill requests through [employer] or call your pharmacy and they will forward the refill request to us. Please allow 3 business days for your refill to be completed.          Additional Information About Your Visit        MyChart Information     [employer] lets you send messages to your doctor, view your test results, renew your prescriptions, schedule appointments and more. To sign up, go to [email or web address] . Click on \"Log in\" on the left side of the screen, which will take you to the Welcome page. Then click on \"Sign up Now\" on the right side of the page.     You will be asked to enter the access code listed below, as well as some personal information. Please follow the directions to create your username and password.     Your access code is: [email or web address]  Expires: 2017  4:47 PM     Your access code will  in 90 days. If you need help or a new code, please " "call your [place] clinic or [phone].        Care EveryWhere ID     This is your Care EveryWhere ID. This could be used by other organizations to access your [place] medical records  [id number]        Your Vitals Were     Pulse Height BMI (Body Mass Index)             69 1.702 m (5' 7\") 23.65 kg/m2          Blood Pressure from Last 3 Encounters:   07/07/17 121/73   04/28/17 145/66   02/28/17 114/66    Weight from Last 3 Encounters:   07/07/17 68.5 kg (151 lb)   04/28/17 67.1 kg (148 lb)   02/28/17 68 kg (150 lb)              We Performed the Following     PAIN BEHAVIORAL EVAL/TREAT/FOLLOW UP     PAIN INJECTION EVAL/TREAT/FOLLOW UP        Primary Care Provider Office Phone # Fax #    Munir Andersongianin,  [phone]       [address]   [facility] [address]        Equal Access to Services     ROSEMARY EDUARDO AH: Hadii aad ku hadasho Soomaali, waaxda luqadaha, qaybta kaalmada adeegyada, waxay idiin hayshanian sammy kharajessica price . So [facility] [phone].    ATENCIÓN: Si habla español, tiene a ruzi disposición servicios gratuitos de asistencia lingüística. Llame al [phone].    We comply with applicable federal civil rights laws and [place] laws. We do not discriminate on the basis of race, color, national origin, age, disability sex, sexual orientation or gender identity.            Thank you!     Thank you for choosing [facility]  for your care. Our goal is always to provide you with excellent care. Hearing back from our patients is one way we can continue to improve our services. Please take a few minutes to complete the written survey that you may receive in the mail after your visit with us. Thank you!             Your Updated Medication List - Protect others around you: Learn how to safely use, store and throw away your medicines at www.disposemymeds.org.          This list is accurate as of: 7/7/17  3:32 PM.  Always use your most recent med list.       "             Brand Name Dispense Instructions for use Diagnosis    amoxicillin-clavulanate 125-31.25 MG/5ML suspension    AUGMENTIN     Take 45 mg/kg/day by mouth 2 times daily Reported on 4/28/2017        aspirin 81 MG tablet     30 tablet    Take 1 tablet (81 mg) by mouth daily    Routine general medical examination at a health care facility, Breast cancer, right (H), Rectal prolapse       cholecalciferol 1000 UNIT tablet    vitamin D    200 tablet    TAKE TWO TABLETS BY MOUTH EVERY DAY    Routine general medical examination at a health care facility       cyclobenzaprine 10 MG tablet    FLEXERIL    30 tablet    Take 1 tablet (10 mg) by mouth At Bedtime    Chronic pain syndrome       * docusate sodium 100 MG tablet    COLACE    60 tablet    Take 100 mg by mouth daily    Pelvic prolapse       *  MG capsule   Generic drug:  docusate sodium     100 capsule    TAKE ONE CAPSULE BY MOUTH EVERY DAY    Female genital prolapse, unspecified type       gabapentin 300 MG capsule    NEURONTIN    270 capsule    Increase the Gabapentin to 900 mg three time a day.    Encounter for other specified aftercare       HYDROcodone-acetaminophen 5-325 MG per tablet    NORCO    75 tablet    Take 1 tablet by mouth every 6 hours as needed for pain Max of 3 tablets per day on bad days. This is a 30 day supply.  Dispense on/after 6/30/17. To start on 7/2/17    Spasm, Neck pain       methocarbamol 500 MG tablet    ROBAXIN    90 tablet    Take 1 tablet (500 mg) by mouth 3 times daily as needed for muscle spasms    Myofascial pain       metoprolol 50 MG tablet    LOPRESSOR    180 tablet    TAKE ONE TABLET BY MOUTH TWICE A DAY    Hypertension goal BP (blood pressure) < 130/80       multivitamin Tabs per tablet     60 tablet    TAKE ONE TABLET BY MOUTH EVERY DAY    Routine history and physical examination of adult       omeprazole 20 MG CR capsule    priLOSEC    90 capsule    TAKE ONE CAPSULE BY MOUTH EVERY DAY    Gastroesophageal reflux  disease with esophagitis       polyethylene glycol powder    MIRALAX/GLYCOLAX    527 g    STIR 17 GM OF POWDER (SEE DEXTER INSIDE CAP) IN 8-OZ OF LIQUID UNTIL COMPLETELY DISSOLVED. DRINK THE SOLUTION DAILY OR AS DIRECTED.    Slow transit constipation       zonisamide 25 MG capsule    Zonegran          * Notice:  This list has 2 medication(s) that are the same as other medications prescribed for you. Read the directions carefully, and ask your doctor or other care provider to review them with you.

## 2017-07-07 NOTE — NURSING NOTE
"Chief Complaint   Patient presents with     Pain       Initial /73  Pulse 69  Ht 1.702 m (5' 7\")  Wt 68.5 kg (151 lb)  BMI 23.65 kg/m2 Estimated body mass index is 23.65 kg/(m^2) as calculated from the following:    Height as of this encounter: 1.702 m (5' 7\").    Weight as of this encounter: 68.5 kg (151 lb).  Medication Reconciliation: complete       Chauncey Flores MA          "

## 2017-07-21 DIAGNOSIS — G24.3 CERVICAL DYSTONIA: Primary | ICD-10-CM

## 2017-07-21 NOTE — TELEPHONE ENCOUNTER
Cyclobenzaprine (FLEXRIL) 10 MG tablet      Last Written Prescription Date:  06/27/2016  Last Fill Quantity: 30,   # refills: 0  Last Office Visit with [facility], [facility] or [facility] prescribing provider: 07/07/2017  Future Office visit:    Next 5 appointments (look out 90 days)     Aug 29, 2017  1:30 PM CDT   Return Visit with Sandra Arroyo MD   [facility] ([address] [address]   [phone]            Oct 03, 2017  3:00 PM CDT   Return Visit with Antoine Narvaez MD   [facility])    [address] [facility] [id number] [phone]                   Routing refill request to provider for review/approval because:  Drug not on the [facility], [facility] or [facility] refill protocol or controlled substance    Eufemia Alvarez CMA

## 2017-07-24 RX ORDER — CYCLOBENZAPRINE HCL 10 MG
TABLET ORAL
Qty: 30 TABLET | Refills: 5 | Status: SHIPPED | OUTPATIENT
Start: 2017-07-24 | End: 2017-12-05

## 2017-07-27 DIAGNOSIS — M54.2 NECK PAIN: ICD-10-CM

## 2017-07-27 DIAGNOSIS — R25.2 SPASM: ICD-10-CM

## 2017-07-27 NOTE — TELEPHONE ENCOUNTER
Received call from patient requesting refill(s) of HYDROcodone-acetaminophen (NORCO) 5-325 MG per tablet    Last picked up from pharmacy on 07/03/17    Pt last seen by prescribing provider on 07/07/17  Next appt scheduled for 10/03/17    Last urine drug screen date 11/30/16  Current opioid agreement on file (completed within the last year) Yes Date of opioid agreement: 12/21/16    Processing (pick one and delete the others):      Mail to [place] pharmacy   [address] [facility] [address]    Will route to nursing [place] for review and preparation of prescription(s).

## 2017-07-27 NOTE — TELEPHONE ENCOUNTER
VM left today at: 1:44 pm        Reason for call:  Medication   If this is a refill request, has the caller requested the refill from the pharmacy already? N/A  Will the patient be using a [facility]? Yes  Name of the pharmacy and phone number for the current request: [facility]     Name of the medication requested: Hydrocodone     Phone number to reach patient:  Home number on file [phone] (home)        Can we leave a detailed message on this number?  YES     Melida ROCA    [facility] [place]

## 2017-07-27 NOTE — TELEPHONE ENCOUNTER
Medication refill information reviewed.     Due date for Norco is 8/2/2017    Prescriptions prepped for review.     Will route to provider.     Abril Culp RN, [facility]  Pain Clinic Care Coordinator

## 2017-07-28 RX ORDER — HYDROCODONE BITARTRATE AND ACETAMINOPHEN 5; 325 MG/1; MG/1
1 TABLET ORAL EVERY 6 HOURS PRN
Qty: 75 TABLET | Refills: 0 | Status: SHIPPED | OUTPATIENT
Start: 2017-07-28 | End: 2017-08-25

## 2017-07-28 NOTE — TELEPHONE ENCOUNTER
Signed Prescriptions:                        Disp   Refills    HYDROcodone-acetaminophen (NORCO) 5-325 MG*75 tab*0        Sig: Take 1 tablet by mouth every 6 hours as needed for           pain Max of 3 tablets per day on bad days. This           is a 30 day supply.  Dispense on/after 7/31/17.           To start on 8/2/17  Authorizing Provider: ANTOINE SU    Reviewed, printed, and signed in [facility].  Placed in MA basket.    Antoine HOGUE. Sharri Narvaez MD  [facility]

## 2017-07-28 NOTE — TELEPHONE ENCOUNTER
Script for Norco was signed and mailed out to [place] pharmacy. Patient notified.     [address] [facility] [address]      Chauncey Flores MA

## 2017-07-29 ENCOUNTER — HEALTH MAINTENANCE LETTER (OUTPATIENT)
Age: 62
End: 2017-07-29

## 2017-08-25 DIAGNOSIS — M54.2 NECK PAIN: ICD-10-CM

## 2017-08-25 DIAGNOSIS — R25.2 SPASM: ICD-10-CM

## 2017-08-25 RX ORDER — HYDROCODONE BITARTRATE AND ACETAMINOPHEN 5; 325 MG/1; MG/1
1 TABLET ORAL EVERY 6 HOURS PRN
Qty: 75 TABLET | Refills: 0 | Status: SHIPPED | OUTPATIENT
Start: 2017-08-25 | End: 2017-09-27

## 2017-08-25 NOTE — TELEPHONE ENCOUNTER
Pt LM at 0953 ---    Reason for call:  Medication   If this is a refill request, has the caller requested the refill from the pharmacy already? N/A  Will the patient be using a [facility]? Yes  Name of the pharmacy and phone number for the current request: [facility], [place] - [address]     Name of the medication requested: HYDROcodone-acetaminophen (NORCO) 5-325 MG per tablet    Other request: Pt would like rx sent to [facility] pharmacy      Phone number to reach patient:  Home number on file [phone] (home)    Ainsley Sky    [facility]

## 2017-08-25 NOTE — TELEPHONE ENCOUNTER
Signed Prescriptions:                        Disp   Refills    HYDROcodone-acetaminophen (NORCO) 5-325 MG*75 tab*0        Sig: Take 1 tablet by mouth every 6 hours as needed for           pain Max of 3 tablets per day on bad days. This           is a 30 day supply.  Dispense on/after 8/30/17.           To start on 9/1/17  Authorizing Provider: ANTOINE SU    Reviewed, printed, and signed in [facility].  Placed in MA box for processing.    Antoine Narvaez MD  [facility]

## 2017-08-25 NOTE — TELEPHONE ENCOUNTER
Received call from patient requesting refill(s) of HYDROcodone-acetaminophen (NORCO) 5-325 MG per tablet     Last picked up from pharmacy on 07/31/17    Pt last seen by prescribing provider on 7/7/17  Next appt scheduled for 10/3/17    Last urine drug screen date 11/30/16  Current opioid agreement on file (completed within the last year) Yes Date of opioid agreement: 1/18/17    Processing (pick one)     Mail to [facility]   [address] [facility] [address]    Will route to nursing [place] for review and preparation of prescription(s).

## 2017-08-25 NOTE — TELEPHONE ENCOUNTER
Medication refill information reviewed.     Due date for Norco is 9/1/2017    Prescriptions prepped for review.     Will route to provider.     Abril Culp RN, [facility]  Pain Clinic Care Coordinator

## 2017-08-25 NOTE — TELEPHONE ENCOUNTER
Signed Rx mailing from [place] on 08/26/17. Mailing to [facility]. Patient was notified.

## 2017-08-28 ENCOUNTER — HOSPITAL ENCOUNTER (OUTPATIENT)
Dept: GENERAL RADIOLOGY | Facility: CLINIC | Age: 62
End: 2017-08-28
Attending: ANESTHESIOLOGY | Admitting: ANESTHESIOLOGY
Payer: MEDICARE

## 2017-08-28 ENCOUNTER — SURGERY (OUTPATIENT)
Age: 62
End: 2017-08-28

## 2017-08-28 ENCOUNTER — HOSPITAL ENCOUNTER (OUTPATIENT)
Facility: CLINIC | Age: 62
Discharge: HOME | End: 2017-08-28
Attending: ANESTHESIOLOGY | Admitting: ANESTHESIOLOGY
Payer: MEDICARE

## 2017-08-28 VITALS
DIASTOLIC BLOOD PRESSURE: 66 MMHG | WEIGHT: 154 LBS | HEIGHT: 67 IN | BODY MASS INDEX: 24.17 KG/M2 | TEMPERATURE: 98.6 F | OXYGEN SATURATION: 98 % | RESPIRATION RATE: 16 BRPM | SYSTOLIC BLOOD PRESSURE: 111 MMHG

## 2017-08-28 DIAGNOSIS — M53.3 SI (SACROILIAC) JOINT DYSFUNCTION: ICD-10-CM

## 2017-08-28 PROCEDURE — 25000125 ZZHC RX 250: Performed by: ANESTHESIOLOGY

## 2017-08-28 PROCEDURE — 64493 INJ PARAVERT F JNT L/S 1 LEV: CPT | Mod: RT | Performed by: ANESTHESIOLOGY

## 2017-08-28 PROCEDURE — 64493 INJ PARAVERT F JNT L/S 1 LEV: CPT | Performed by: ANESTHESIOLOGY

## 2017-08-28 PROCEDURE — S0020 INJECTION, BUPIVICAINE HYDRO: HCPCS | Performed by: ANESTHESIOLOGY

## 2017-08-28 PROCEDURE — 25000128 H RX IP 250 OP 636: Performed by: ANESTHESIOLOGY

## 2017-08-28 PROCEDURE — 27096 INJECT SACROILIAC JOINT: CPT | Mod: RT | Performed by: ANESTHESIOLOGY

## 2017-08-28 PROCEDURE — 40000277 XR SURGERY CARM FLUORO LESS THAN 5 MIN W STILLS: Mod: TC

## 2017-08-28 RX ORDER — METHYLPREDNISOLONE ACETATE 40 MG/ML
INJECTION, SUSPENSION INTRA-ARTICULAR; INTRALESIONAL; INTRAMUSCULAR; SOFT TISSUE PRN
Status: DISCONTINUED | OUTPATIENT
Start: 2017-08-28 | End: 2017-08-28 | Stop reason: HOSPADM

## 2017-08-28 RX ORDER — BUPIVACAINE HYDROCHLORIDE 2.5 MG/ML
INJECTION, SOLUTION EPIDURAL; INFILTRATION; INTRACAUDAL PRN
Status: DISCONTINUED | OUTPATIENT
Start: 2017-08-28 | End: 2017-08-28 | Stop reason: HOSPADM

## 2017-08-28 RX ORDER — IOPAMIDOL 612 MG/ML
INJECTION, SOLUTION INTRATHECAL PRN
Status: DISCONTINUED | OUTPATIENT
Start: 2017-08-28 | End: 2017-08-28 | Stop reason: HOSPADM

## 2017-08-28 RX ADMIN — LIDOCAINE HYDROCHLORIDE 1 ML: 10 INJECTION, SOLUTION EPIDURAL; INFILTRATION; INTRACAUDAL; PERINEURAL at 13:12

## 2017-08-28 RX ADMIN — METHYLPREDNISOLONE ACETATE 40 MG: 40 INJECTION, SUSPENSION INTRA-ARTICULAR; INTRALESIONAL; INTRAMUSCULAR; SOFT TISSUE at 13:19

## 2017-08-28 RX ADMIN — BUPIVACAINE HYDROCHLORIDE 1 ML: 2.5 INJECTION, SOLUTION EPIDURAL; INFILTRATION; INTRACAUDAL; PERINEURAL at 13:19

## 2017-08-28 RX ADMIN — IOPAMIDOL 1 ML: 612 INJECTION, SOLUTION INTRATHECAL at 13:12

## 2017-08-28 NOTE — OP NOTE
Pre procedure Diagnosis: lumbosacral facet arthropathy, lumbosacral spondylosis, SI joint dysfunction   Post procedure Diagnosis: Same  Procedure performed: lumbosacral facet joint injection at L5-S1 on the right and right SI joint injection, fluoroscopically guided, contrast controlled  Indication:  Therapeutic for pain  Anesthesia: none  Complication:  none  Operators: Antoine Narvaez MD    Indications:   Yusra Rivas is a 62 year old female who is known to me that presents today for lumbar procedures.  The patient has a history of chronic lower back pain on the right since multilevel thoracolumbar fusion.  Exam shows tenderness to palpation at the L5-S1 facet joint and sacroiliac joint and reproduction of pain with extension and lateral rotation of the lumbar spine.  They have tried conservative treatment including physical therapy, behavioral modification, medications, and previous interventional procedures.    MRI Lumbar spine completed on 8/31/16 was read as:  FINDINGS: Plain films dated 8/31/2016 confirm five functional lumbar  vertebral segments. There is extensive posterior spinal hardware  fusion from the T11 through the L5 level with pedicle screws at all  levels excluding L2. This creates a moderate amount of metallic  artifact. Caudal thecal sac contents appear intrinsically normal to  the extent visualized with metallic artifact. There is minimal wedge  deformity and a complex appearing Schmorl's node at the superior  endplate of L2. There is no associated edema indicating that this is a  chronic and benign finding. There is also mild wedge deformity of the  L3 vertebral segment on the right without bone marrow edema.  Visualized vertebral bone marrow signal is otherwise unremarkable. No  evidence for osseous metastases. Alignment in the sagittal view is  normal.     T10-T11: Normal.     T11-T12: Normal.     T12-L1: Normal.     L1-L2: There is signal loss and mild disc space narrowing. No  "disc  bulge/protrusion or central or foraminal stenosis. Prior bilateral  laminectomy. Posterior facets are obscured by metallic artifact.     L2-L3: There is signal loss and no disc space narrowing or disc  bulge/protrusion. No central or apparent foraminal stenosis although  foramina are not optimally evaluated due to metallic artifact. Prior  bilateral laminectomy. Posterior facets are obscured.     L3-L4: Early signal loss and mild anterior disc space narrowing. No  disc bulge/protrusion or central stenosis. No definite foraminal  stenosis.     L4-L5: Normal.     L5-S1: Normal disc space with no disc bulge/protrusion or central  stenosis. Moderate posterior facet degenerative changes bilaterally,  right greater than left. This causes mild bilateral foraminal  stenosis.         IMPRESSION:   1. Extensive posterior spinal hardware fusion from the T11 through the  L5 level. Prior bilateral laminectomy L1-L2 and L2-L3.  2. Very early degenerative disc disease at some lumbar levels. No  significant central or foraminal stenosis.    Options/alternatives, benefits and risks were discussed with the patient including bleeding, infection, flared pain, tissue trauma, exposure to radiation, reaction to medications including seizure, spinal cord injury, paralysis, weakness, numbness and headache.     Questions were answered to her satisfaction and she wishes to proceed. Voluntary informed consent was obtained and signed.     Vitals were reviewed: Yes  /66  Temp 98.6  F (37  C) (Oral)  Resp 16  Ht 1.702 m (5' 7\")  Wt 69.9 kg (154 lb)  SpO2 98%  BMI 24.12 kg/m2  Allergies were reviewed:  Yes   Medications were reviewed:  Yes   Pre-procedure pain score: 4/10    Procedure:  After obtaining signed informed consent, the patient was brought into the procedure suite and was placed in a prone position on the procedure table.   A Pause for the Cause was performed.  The patient was prepped and draped in the usual sterile " fashion.     Under AP fluoroscopic guidance the L5-S1 facet joint on the right side was identified, and the C-arm was rotated obliquely to the affected side to open the joint space. A total of 1 ml of 1% lidocaine was injected at the needle entry point and needle tract. Then a 27 gauge 3.5 inch spinal needle was inserted and advanced under fluoroscopic guidance targeting the superior articular pillar of the joint. Once the needle made contact with the SAP, it was rotated and advanced into the joint with positive pain reproduction    Then, a 27 gauge 3.5 inch spinal needle was advanced into the right sacroiliac joint utilizing AP and lateral fluoroscopy with positive pain reproduction.  Aspiration was negative.    AP and lateral fluoroscopic views were obtained to confirm the needle placement. Then,  Isovue 300 contrast dye was injected after negative aspiration for heme and CSF in each joint, confirming appropriate placement.  A total of 0.5 ml of Isovue was used.  Isovue wasted:  2 ml.    Then, after repeated negative aspiration at both sites, each joint was injected with 1.5 ml of a combination of Depomedrol 40 mg and Bupivicaine 0.25% 2 ml for a total injectate volume of 3 ml and the needles were flushed with lidocaine as they were withdrawn.        Hemostasis was achieved, the area was cleaned, and bandaids were placed when appropriate.  The patient tolerated the procedure well, and was taken to the recovery room.    Images were saved to PACS.    Post-procedure pain score: 0/10  Follow-up includes:   -f/u phone call in one week  -f/u with PCP and in the pain clinic as scheduled    Antoine Narvaez MD  [facility] [place]

## 2017-08-28 NOTE — IP AVS SNAPSHOT
MRN:[id number]                      After Visit Summary   8/28/2017    Yusra Rivas    MRN: [id number]           Thank you!     Thank you for choosing [facility] for your care. Our goal is always to provide you with excellent care. Hearing back from our patients is one way we can continue to improve our services. Please take a few minutes to complete the written survey that you may receive in the mail after you visit with us. Thank you!        Patient Information     Date Of Birth          1955        About your hospital stay     You were admitted on:  August 28, 2017 You last received care in the:  [facility] OR    You were discharged on:  August 28, 2017       Who to Call     For medical emergencies, please call 911.  For non-urgent questions about your medical care, please call your primary care provider or clinic, [phone]  For questions related to your surgery, please call your surgery clinic        Attending Provider     Provider Specialty    Antoine Ibrahim MD ANESTHESIOLOGY-PAIN MEDICINE       Primary Care Provider Office Phone # Fax #    Munir Kamaljit AndersongianinDO [phone]      After Care Instructions     Discharge Instructions       [facility]   Procedure Discharge Instructions    Today you saw:    Dr. Antoine Escobar    You had an:  Epidural steroid injection   sacro-iliac joint injection   facet joint injection  hip injection  piriformis injection     Medications used:  Lidocaine   Bupivacaine   Dexamethasone Depo-medrol  Omnipaque  Ropivicaine   Kenalog   Omniscan   Normal saline        If you have received sedation before, during, or after your procedure, for the next 24 hours you shall NOT:   -Drive  -Operate machinery  -Drink alcohol  -Sign any legal documents  Be cautious when walking. Numbness and/or weakness in the lower extremities may occur for up to 6-8 hours after the  procedure due to effect of the local anesthetic  Do not drive for 6 hours. The effect of the local anesthetic could slow your reflexes.   You may resume your regular activities after 24 hours  Avoid strenuous activity for the first 24 hours  You may shower, however avoid swimming, tub baths or hot tubs for 24 hours following your procedure  You may have a mild to moderate increase in pain for several days following the injection.  It may take up to 14 days for the steroid medication to start working although you may feel the effect as early as a few days after the procedure.     You may use ice packs for 10-15 minutes, 3 to 4 times a day at the injection site for comfort  Do not use heat to painful areas for 6 to 8 hours. This will give the local anesthetic time to wear off and prevent you from accidentally burning your skin.   You may use anti-inflammatory medications (such as Ibuprofen or Aleve or Advil) or Tylenol for pain control if necessary  If you were fasting, you may resume your normal diet and medications after the procedure  If you have diabetes, check your blood sugar more frequently than usual as your blood sugar may be higher than normal for 10-14 days following a steroid injection. Contact your doctor who manages your diabetes if your blood sugar is higher than usual  If you experience any of the following, call the pain center nursing line during work hours at [phone] or the after hours provider line at [phone]:  -Fever over 100 degree F  -Swelling, bleeding, redness, drainage, warmth at the injection site  -Progressive weakness or numbness in your legs or arms  -Loss of bowel or bladder function  -Unusual headache that is not relieved by Tylenol  -Unusual new onset of pain that is not improving    Phone #s:  Appointment line: [phone];  Nurse line: [phone]                  Your next 10 appointments already scheduled     Aug 29, 2017  1:30 PM CDT   Return Visit with Sandra CABRERA  "MD Cruz   [facility]    [address] [facility] [address] [phone]            Oct 03, 2017  3:00 PM CDT   Return Visit with Antoine Narvaez MD   [facility] [place] ([facility] [place])    [id number]  [place] [id number] [phone]              Pending Results     No orders found from 2017 to 2017.            Admission Information     Date & Time Provider Department Dept. Phone    2017 Antoine Ibrahim MD [facility] [phone]      Your Vitals Were     Blood Pressure Temperature Respirations Height Weight Pulse Oximetry    128/76 98.6  F (37  C) (Oral) 16 1.702 m (5' 7\") 69.9 kg (154 lb) 98%    BMI (Body Mass Index)                   24.12 kg/m2           MyChart Information     [employer] lets you send messages to your doctor, view your test results, renew your prescriptions, schedule appointments and more. To sign up, go to [email or web address] . Click on \"Log in\" on the left side of the screen, which will take you to the Welcome page. Then click on \"Sign up Now\" on the right side of the page.     You will be asked to enter the access code listed below, as well as some personal information. Please follow the directions to create your username and password.     Your access code is: [email or web address]  Expires: 2017  1:29 PM     Your access code will  in 90 days. If you need help or a new code, please call your [place] clinic or [phone].        Care EveryWhere ID     This is your Care EveryWhere ID. This could be used by other organizations to access your [place] medical records  [id number]        Equal Access to Services     ROSEMARY EDUARDO : Luca Rosado, honey champagne, qajonah katrisha parker. So [facility] [phone].    ATENCIÓN: Si habla montana, tiene a ruiz disposición servicios gratuitos de " kerri lingüísticveronica. Jean al [phone].    We comply with applicable federal civil rights laws and [place] laws. We do not discriminate on the basis of race, color, national origin, age, disability sex, sexual orientation or gender identity.               Review of your medicines      UNREVIEWED medicines. Ask your doctor about these medicines        Dose / Directions    amoxicillin-clavulanate 125-31.25 MG/5ML suspension   Commonly known as:  AUGMENTIN        Dose:  45 mg/kg/day   Take 45 mg/kg/day by mouth 2 times daily Reported on 4/28/2017   Refills:  0       aspirin 81 MG tablet   Used for:  Routine general medical examination at a health care facility, Breast cancer, right (H), Rectal prolapse        Dose:  81 mg   Take 1 tablet (81 mg) by mouth daily   Quantity:  30 tablet   Refills:  11       cholecalciferol 1000 UNIT tablet   Commonly known as:  vitamin D   Used for:  Routine general medical examination at a health care facility        TAKE TWO TABLETS BY MOUTH EVERY DAY   Quantity:  200 tablet   Refills:  3       * cyclobenzaprine 10 MG tablet   Commonly known as:  FLEXERIL   Used for:  Chronic pain syndrome        Dose:  10 mg   Take 1 tablet (10 mg) by mouth At Bedtime   Quantity:  30 tablet   Refills:  0       * cyclobenzaprine 10 MG tablet   Commonly known as:  FLEXERIL   Used for:  Cervical dystonia        TAKE ONE TABLET BY MOUTH AT BEDTIME   Quantity:  30 tablet   Refills:  5       * docusate sodium 100 MG tablet   Commonly known as:  COLACE   Used for:  Pelvic prolapse        Dose:  100 mg   Take 100 mg by mouth daily   Quantity:  60 tablet   Refills:  1       *  MG capsule   Used for:  Female genital prolapse, unspecified type   Generic drug:  docusate sodium        TAKE ONE CAPSULE BY MOUTH EVERY DAY   Quantity:  100 capsule   Refills:  3       gabapentin 300 MG capsule   Commonly known as:  NEURONTIN   Used for:  Encounter for other specified aftercare        Increase the  Gabapentin to 900 mg three time a day.   Quantity:  270 capsule   Refills:  3       HYDROcodone-acetaminophen 5-325 MG per tablet   Commonly known as:  NORCO   Used for:  Spasm, Neck pain        Dose:  1 tablet   Take 1 tablet by mouth every 6 hours as needed for pain Max of 3 tablets per day on bad days. This is a 30 day supply.  Dispense on/after 8/30/17. To start on 9/1/17   Quantity:  75 tablet   Refills:  0       methocarbamol 500 MG tablet   Commonly known as:  ROBAXIN   Used for:  Myofascial pain        Dose:  500 mg   Take 1 tablet (500 mg) by mouth 3 times daily as needed for muscle spasms   Quantity:  90 tablet   Refills:  1       metoprolol 50 MG tablet   Commonly known as:  LOPRESSOR   Used for:  Hypertension goal BP (blood pressure) < 130/80        TAKE ONE TABLET BY MOUTH TWICE A DAY   Quantity:  180 tablet   Refills:  2       multivitamin Tabs per tablet   Used for:  Routine history and physical examination of adult        TAKE ONE TABLET BY MOUTH EVERY DAY   Quantity:  60 tablet   Refills:  8       omeprazole 20 MG CR capsule   Commonly known as:  priLOSEC   Used for:  Gastroesophageal reflux disease with esophagitis        TAKE ONE CAPSULE BY MOUTH EVERY DAY   Quantity:  90 capsule   Refills:  2       polyethylene glycol powder   Commonly known as:  MIRALAX/GLYCOLAX   Used for:  Slow transit constipation        STIR 17 GM OF POWDER (SEE DEXTER INSIDE CAP) IN 8-OZ OF LIQUID UNTIL COMPLETELY DISSOLVED. DRINK THE SOLUTION DAILY OR AS DIRECTED.   Quantity:  527 g   Refills:  5       zonisamide 25 MG capsule   Commonly known as:  Zonegran        Refills:  0       * Notice:  This list has 4 medication(s) that are the same as other medications prescribed for you. Read the directions carefully, and ask your doctor or other care provider to review them with you.             Protect others around you: Learn how to safely use, store and throw away your medicines at www.disposemymeds.org.             Medication  List: This is a list of all your medications and when to take them. Check marks below indicate your daily home schedule. Keep this list as a reference.      Medications           Morning Afternoon Evening Bedtime As Needed    amoxicillin-clavulanate 125-31.25 MG/5ML suspension   Commonly known as:  AUGMENTIN   Take 45 mg/kg/day by mouth 2 times daily Reported on 4/28/2017                                aspirin 81 MG tablet   Take 1 tablet (81 mg) by mouth daily                                cholecalciferol 1000 UNIT tablet   Commonly known as:  vitamin D   TAKE TWO TABLETS BY MOUTH EVERY DAY                                * cyclobenzaprine 10 MG tablet   Commonly known as:  FLEXERIL   Take 1 tablet (10 mg) by mouth At Bedtime                                * cyclobenzaprine 10 MG tablet   Commonly known as:  FLEXERIL   TAKE ONE TABLET BY MOUTH AT BEDTIME                                * docusate sodium 100 MG tablet   Commonly known as:  COLACE   Take 100 mg by mouth daily                                *  MG capsule   TAKE ONE CAPSULE BY MOUTH EVERY DAY   Generic drug:  docusate sodium                                gabapentin 300 MG capsule   Commonly known as:  NEURONTIN   Increase the Gabapentin to 900 mg three time a day.                                HYDROcodone-acetaminophen 5-325 MG per tablet   Commonly known as:  NORCO   Take 1 tablet by mouth every 6 hours as needed for pain Max of 3 tablets per day on bad days. This is a 30 day supply.  Dispense on/after 8/30/17. To start on 9/1/17                                methocarbamol 500 MG tablet   Commonly known as:  ROBAXIN   Take 1 tablet (500 mg) by mouth 3 times daily as needed for muscle spasms                                metoprolol 50 MG tablet   Commonly known as:  LOPRESSOR   TAKE ONE TABLET BY MOUTH TWICE A DAY                                multivitamin Tabs per tablet   TAKE ONE TABLET BY MOUTH EVERY DAY                                 omeprazole 20 MG CR capsule   Commonly known as:  priLOSEC   TAKE ONE CAPSULE BY MOUTH EVERY DAY                                polyethylene glycol powder   Commonly known as:  MIRALAX/GLYCOLAX   STIR 17 GM OF POWDER (SEE DEXTER INSIDE CAP) IN 8-OZ OF LIQUID UNTIL COMPLETELY DISSOLVED. DRINK THE SOLUTION DAILY OR AS DIRECTED.                                zonisamide 25 MG capsule   Commonly known as:  Zonegran                                * Notice:  This list has 4 medication(s) that are the same as other medications prescribed for you. Read the directions carefully, and ask your doctor or other care provider to review them with you.

## 2017-08-28 NOTE — IP AVS SNAPSHOT
[facility]    [address] [place] [address] [address] [place]UMAS [address]    Phone:  [phone]                                       After Visit Summary   8/28/2017    Yusra Rivas    MRN: [id number]           After Visit Summary Signature Page     I have received my discharge instructions, and my questions have been answered. I have discussed any challenges I see with this plan with the nurse or doctor.    ..........................................................................................................................................  Patient/Patient Representative Signature      ..........................................................................................................................................  Patient Representative Print Name and Relationship to Patient    ..................................................               ................................................  Date                                            Time    ..........................................................................................................................................  Reviewed by Signature/Title    ...................................................              ..............................................  Date                                                            Time

## 2017-08-29 ENCOUNTER — ONCOLOGY VISIT (OUTPATIENT)
Dept: ONCOLOGY | Facility: CLINIC | Age: 62
End: 2017-08-29
Payer: MEDICARE

## 2017-08-29 VITALS
TEMPERATURE: 97 F | WEIGHT: 154.8 LBS | BODY MASS INDEX: 24.3 KG/M2 | OXYGEN SATURATION: 99 % | SYSTOLIC BLOOD PRESSURE: 109 MMHG | DIASTOLIC BLOOD PRESSURE: 69 MMHG | RESPIRATION RATE: 16 BRPM | HEART RATE: 72 BPM | HEIGHT: 67 IN

## 2017-08-29 DIAGNOSIS — G62.0 POLYNEUROPATHY DUE TO DRUGS (H): ICD-10-CM

## 2017-08-29 DIAGNOSIS — C50.811 MALIGNANT NEOPLASM OF OVERLAPPING SITES OF RIGHT BREAST IN FEMALE, ESTROGEN RECEPTOR NEGATIVE (H): ICD-10-CM

## 2017-08-29 DIAGNOSIS — I10 HYPERTENSION GOAL BP (BLOOD PRESSURE) < 130/80: ICD-10-CM

## 2017-08-29 DIAGNOSIS — D72.829 LEUKOCYTOSIS, UNSPECIFIED TYPE: ICD-10-CM

## 2017-08-29 DIAGNOSIS — Z17.1 MALIGNANT NEOPLASM OF OVERLAPPING SITES OF RIGHT BREAST IN FEMALE, ESTROGEN RECEPTOR NEGATIVE (H): ICD-10-CM

## 2017-08-29 DIAGNOSIS — C34.90 MALIGNANT NEOPLASM OF LUNG, UNSPECIFIED LATERALITY, UNSPECIFIED PART OF LUNG (H): Primary | ICD-10-CM

## 2017-08-29 LAB
ALBUMIN SERPL-MCNC: 3.5 G/DL (ref 3.4–5)
ALP SERPL-CCNC: 76 U/L (ref 40–150)
ALT SERPL W P-5'-P-CCNC: 26 U/L (ref 0–50)
ANION GAP SERPL CALCULATED.3IONS-SCNC: 8 MMOL/L (ref 3–14)
AST SERPL W P-5'-P-CCNC: 17 U/L (ref 0–45)
BASOPHILS # BLD AUTO: 0 10E9/L (ref 0–0.2)
BASOPHILS NFR BLD AUTO: 0.2 %
BILIRUB SERPL-MCNC: 0.3 MG/DL (ref 0.2–1.3)
BUN SERPL-MCNC: 19 MG/DL (ref 7–30)
CALCIUM SERPL-MCNC: 9 MG/DL (ref 8.5–10.1)
CHLORIDE SERPL-SCNC: 108 MMOL/L (ref 94–109)
CO2 SERPL-SCNC: 26 MMOL/L (ref 20–32)
CREAT SERPL-MCNC: 0.72 MG/DL (ref 0.52–1.04)
DIFFERENTIAL METHOD BLD: ABNORMAL
EOSINOPHIL # BLD AUTO: 0.2 10E9/L (ref 0–0.7)
EOSINOPHIL NFR BLD AUTO: 1.1 %
ERYTHROCYTE [DISTWIDTH] IN BLOOD BY AUTOMATED COUNT: 13.1 % (ref 10–15)
GFR SERPL CREATININE-BSD FRML MDRD: 82 ML/MIN/1.7M2
GLUCOSE SERPL-MCNC: 92 MG/DL (ref 70–99)
HCT VFR BLD AUTO: 40.8 % (ref 35–47)
HGB BLD-MCNC: 13.5 G/DL (ref 11.7–15.7)
IMM GRANULOCYTES # BLD: 0 10E9/L (ref 0–0.4)
IMM GRANULOCYTES NFR BLD: 0.3 %
LYMPHOCYTES # BLD AUTO: 3.9 10E9/L (ref 0.8–5.3)
LYMPHOCYTES NFR BLD AUTO: 25.8 %
MCH RBC QN AUTO: 30.4 PG (ref 26.5–33)
MCHC RBC AUTO-ENTMCNC: 33.1 G/DL (ref 31.5–36.5)
MCV RBC AUTO: 92 FL (ref 78–100)
MONOCYTES # BLD AUTO: 1.2 10E9/L (ref 0–1.3)
MONOCYTES NFR BLD AUTO: 7.7 %
NEUTROPHILS # BLD AUTO: 9.8 10E9/L (ref 1.6–8.3)
NEUTROPHILS NFR BLD AUTO: 64.9 %
PLATELET # BLD AUTO: 287 10E9/L (ref 150–450)
POTASSIUM SERPL-SCNC: 3.9 MMOL/L (ref 3.4–5.3)
PROT SERPL-MCNC: 7.6 G/DL (ref 6.8–8.8)
RBC # BLD AUTO: 4.44 10E12/L (ref 3.8–5.2)
SODIUM SERPL-SCNC: 142 MMOL/L (ref 133–144)
WBC # BLD AUTO: 15 10E9/L (ref 4–11)

## 2017-08-29 PROCEDURE — 36415 COLL VENOUS BLD VENIPUNCTURE: CPT | Performed by: INTERNAL MEDICINE

## 2017-08-29 PROCEDURE — 80053 COMPREHEN METABOLIC PANEL: CPT | Performed by: INTERNAL MEDICINE

## 2017-08-29 PROCEDURE — 85025 COMPLETE CBC W/AUTO DIFF WBC: CPT | Performed by: INTERNAL MEDICINE

## 2017-08-29 PROCEDURE — 99215 OFFICE O/P EST HI 40 MIN: CPT | Performed by: INTERNAL MEDICINE

## 2017-08-29 ASSESSMENT — PAIN SCALES - GENERAL: PAINLEVEL: NO PAIN (0)

## 2017-08-29 NOTE — MR AVS SNAPSHOT
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 Arroyo in 6 months.  Please come 15-30 minutes prior to follow up for labs.    [facility] Date/Time:  9/6/17 at 1:30    Follow Up Date/Time: 2/27/18 at 1:30    If you have any questions or concerns please feel free to call.    Jeffry Smith, RN, BSN   Oncology Care Coordinator RN  [facility]  [phone]              Follow-ups after your visit        Your next 10 appointments already scheduled     Sep 06, 2017  1:30 PM CDT   LAB with NL LAB [facility]    [address] [facility] [address] [phone]           Patient must bring picture ID. Patient should be prepared to give a urine specimen  Please do not eat 10-12 hours before your appointment if you are coming in fasting for labs on lipids, cholesterol, or glucose (sugar). Pregnant women should follow their Care Team instructions. Water with medications is okay. Do not drink coffee or other fluids. If you have concerns about taking  your medications, please ask at office or if scheduling via [employer]ist[employer], send a message by clicking on Secure Messaging, Message Your Care Team.            Oct 03, 2017  3:00 PM CDT   Return Visit with Antoine Narvaez MD   [facility] ([facility])    [address]  "[place] [address]   [phone]            Feb 27, 2018  1:30 PM CST   [facility] with [facility] MD   [facility]    [address] [facility] [address] [phone]              Future tests that were ordered for you today     Open Future Orders        Priority Expected Expires Ordered    CBC with platelets differential Routine 9/5/2017 8/29/2018 8/29/2017    CBC with platelets differential Routine 2/28/2018 8/29/2018 8/29/2017    Comprehensive metabolic panel Routine 2/28/2018 8/29/2018 8/29/2017    Ca27.29  breast tumor marker Routine 2/28/2018 8/29/2018 8/29/2017            Who to contact     If you have questions or need follow up information about today's clinic visit or your schedule please contact [facility] directly at [phone].  Normal or non-critical lab and imaging results will be communicated to you by [email or web address], letter or phone within 4 business days after the clinic has received the results. If you do not hear from us within 7 days, please contact the clinic through Getuit or phone. If you have a critical or abnormal lab result, we will notify you by phone as soon as possible.  Submit refill requests through [employer] or call your pharmacy and they will forward the refill request to us. Please allow 3 business days for your refill to be completed.          Additional Information About Your Visit        [email or web address] VISION Information     [employer] lets you send messages to your doctor, view your test results, renew your prescriptions, schedule appointments and more. To sign up, go to [email or web address] . Click on \"Log in\" on the left side of the screen, which will take you to the Welcome page. Then click on \"Sign up Now\" on the right side of the page.     You will be asked to enter the access code listed below, as well as some personal information. Please follow the directions to create your username and " "password.     Your access code is: [email or web address]  Expires: 2017  1:29 PM     Your access code will  in 90 days. If you need help or a new code, please call your [place] clinic or [phone].        Care EveryWhere ID     This is your Care EveryWhere ID. This could be used by other organizations to access your [place] medical records  [id number]        Your Vitals Were     Pulse Temperature Respirations Height Pulse Oximetry BMI (Body Mass Index)    72 97  F (36.1  C) (Temporal) 16 1.702 m (5' 7\") 99% 24.25 kg/m2       Blood Pressure from Last 3 Encounters:   17 109/69   17 111/66   17 121/73    Weight from Last 3 Encounters:   17 70.2 kg (154 lb 12.8 oz)   17 69.9 kg (154 lb)   17 68.5 kg (151 lb)              We Performed the Following     CBC with platelets differential     Comprehensive metabolic panel        Primary Care Provider Office Phone # Fax #    Yrtqlevs Kamaljit Lynn [phone] [address] [place] [address]        Equal Access to Services     ROSEMARY EDUARDO AH: Hadii jeff ku hadasho Soomaali, waaxda luqadaha, qaybta kaalmada adeegyada, waxay shadi hayreal wilson. So [facility] [phone].    ATENCIÓN: Si habla español, tiene a ruiz disposición servicios gratuitos de asistencia lingüística. Llame al [phone].    We comply with applicable federal civil rights laws and [place] laws. We do not discriminate on the basis of race, color, national origin, age, disability sex, sexual orientation or gender identity.            Thank you!     Thank you for choosing [facility]  for your care. Our goal is always to provide you with excellent care. Hearing back from our patients is one way we can continue to improve our services. Please take a few minutes to complete the written survey that you may receive in the mail after your visit with us. Thank you!             Your Updated Medication List - Protect " others around you: Learn how to safely use, store and throw away your medicines at www.disposemymeds.org.          This list is accurate as of: 8/29/17  2:21 PM.  Always use your most recent med list.                   Brand Name Dispense Instructions for use Diagnosis    amoxicillin-clavulanate 125-31.25 MG/5ML suspension    AUGMENTIN     Take 45 mg/kg/day by mouth 2 times daily Reported on 4/28/2017        aspirin 81 MG tablet     30 tablet    Take 1 tablet (81 mg) by mouth daily    Routine general medical examination at a health care facility, Breast cancer, right (H), Rectal prolapse       cholecalciferol 1000 UNIT tablet    vitamin D    200 tablet    TAKE TWO TABLETS BY MOUTH EVERY DAY    Routine general medical examination at a health care facility       * cyclobenzaprine 10 MG tablet    FLEXERIL    30 tablet    Take 1 tablet (10 mg) by mouth At Bedtime    Chronic pain syndrome       * cyclobenzaprine 10 MG tablet    FLEXERIL    30 tablet    TAKE ONE TABLET BY MOUTH AT BEDTIME    Cervical dystonia       * docusate sodium 100 MG tablet    COLACE    60 tablet    Take 100 mg by mouth daily    Pelvic prolapse       *  MG capsule   Generic drug:  docusate sodium     100 capsule    TAKE ONE CAPSULE BY MOUTH EVERY DAY    Female genital prolapse, unspecified type       gabapentin 300 MG capsule    NEURONTIN    270 capsule    Increase the Gabapentin to 900 mg three time a day.    Encounter for other specified aftercare       HYDROcodone-acetaminophen 5-325 MG per tablet    NORCO    75 tablet    Take 1 tablet by mouth every 6 hours as needed for pain Max of 3 tablets per day on bad days. This is a 30 day supply.  Dispense on/after 8/30/17. To start on 9/1/17    Spasm, Neck pain       methocarbamol 500 MG tablet    ROBAXIN    90 tablet    Take 1 tablet (500 mg) by mouth 3 times daily as needed for muscle spasms    Myofascial pain       metoprolol 50 MG tablet    LOPRESSOR    180 tablet    TAKE ONE TABLET BY MOUTH  TWICE A DAY    Hypertension goal BP (blood pressure) < 130/80       multivitamin Tabs per tablet     60 tablet    TAKE ONE TABLET BY MOUTH EVERY DAY    Routine history and physical examination of adult       omeprazole 20 MG CR capsule    priLOSEC    90 capsule    TAKE ONE CAPSULE BY MOUTH EVERY DAY    Gastroesophageal reflux disease with esophagitis       polyethylene glycol powder    MIRALAX/GLYCOLAX    527 g    STIR 17 GM OF POWDER (SEE DEXTER INSIDE CAP) IN 8-OZ OF LIQUID UNTIL COMPLETELY DISSOLVED. DRINK THE SOLUTION DAILY OR AS DIRECTED.    Slow transit constipation       zonisamide 25 MG capsule    Zonegran          * Notice:  This list has 4 medication(s) that are the same as other medications prescribed for you. Read the directions carefully, and ask your doctor or other care provider to review them with you.

## 2017-08-29 NOTE — ASSESSMENT & PLAN NOTE
Yusra Rivas was diagnosed in 2000, had a lumpectomy, eventually double mastectomy, TRAM flap procedure, identified right side breast cancer, grade 3 infiltrating ductal cancer, 1 out of 5 positive nodes, ER/NM negative. HER-2/abelino was not done. She received AC followed by Taxol, finished end of 2001. She is on routine surveillance with breast cancer, found to have increasing size of the pulmonary nodules. PET scan 01/2013 found hypermetabolic right upper lobe anterior lateral portion apex 2.4 cm lesion, SUV 14.7. CT-guided right upper lobe biopsy 01/2013 identified poorly differentiated carcinoma, favor lung primary after IHC stain, HER-2 IHC is negative.  She had bilobectomy 2/2013 found to have RLL 1.8 cm adenoCA, mod diff, Grade II; RUL 2.8 cm, mod to poorly diff adeno ca, grade III, total 18 LN - from stations 4, 9. 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

## 2017-08-29 NOTE — PROGRESS NOTES
Hematology/ Oncology Follow-up Visit:  Aug 29, 2017    Reason for Visit:   Chief Complaint   Patient presents with     Oncology Clinic Visit     6 month follow up for Malignant neoplasm of right female breast, unspecified site of breast     Results     Labs today       Oncologic History:  Malignant neoplasm of overlapping sites of breast in female, estrogen receptor negative (H)  Yusra Rivas was diagnosed in 2000, had a lumpectomy, eventually double mastectomy, TRAM flap procedure, identified right side breast cancer, grade 3 infiltrating ductal cancer, 1 out of 5 positive nodes, ER/IL negative. HER-2/abelino was not done. She received AC followed by Taxol, finished end of 2001. She is on routine surveillance with breast cancer, found to have increasing size of the pulmonary nodules. PET scan 01/2013 found hypermetabolic right upper lobe anterior lateral portion apex 2.4 cm lesion, SUV 14.7. CT-guided right upper lobe biopsy 01/2013 identified poorly differentiated carcinoma, favor lung primary after IHC stain, HER-2 IHC is negative.  She had bilobectomy 2/2013 found to have RLL 1.8 cm adenoCA, mod diff, Grade II; RUL 2.8 cm, mod to poorly diff adeno ca, grade III, total 18 LN - from stations 4, 9. 10, positive visceral pleural involvemnet by RUL lesion, pT2a N0M0 stage IB multifocal disease Adjuvant chemo with cisplatin and Taxotere from 4/2013 to 6/2013      Interval History:  Patient disease today for follow-up. She has been feeling well without any significant complaints she denies any fever or chills. She denies any cough or wheezing. She denies any nausea or vomiting. She denies any diarrhea. She denies any chest pain or joint pains.    Review Of Systems:  Constitutional: Negative for fever, chills, and night sweats.  Skin: negative.  Eyes: negative.  Ears/Nose/Throat: negative.  Respiratory: No shortness of breath, dyspnea on exertion, cough, or hemoptysis.  Cardiovascular: negative.  Gastrointestinal:  negative.  Genitourinary: negative.  Musculoskeletal: negative.  Neurologic: negative.  Psychiatric: negative.  Hematologic/Lymphatic/Immunologic: negative.  Endocrine: negative.    All other ROS negative unless mentioned in interval history.    Past medical, social, surgical, and family histories reviewed.    Allergies:  Allergies as of 08/29/2017 - Dexter as Reviewed 08/29/2017   Allergen Reaction Noted     Celexa [citalopram hydrobromide] Difficulty breathing 05/04/2010     Cardizem cd  03/16/2012     Naprosyn [naproxen] Palpitations 12/12/2016     Relafen [nabumetone] Palpitations 12/12/2016       Current Medications:  Current Outpatient Prescriptions   Medication Sig Dispense Refill     HYDROcodone-acetaminophen (NORCO) 5-325 MG per tablet Take 1 tablet by mouth every 6 hours as needed for pain Max of 3 tablets per day on bad days. This is a 30 day supply.  Dispense on/after 8/30/17. To start on 9/1/17 75 tablet 0     cyclobenzaprine (FLEXERIL) 10 MG tablet TAKE ONE TABLET BY MOUTH AT BEDTIME 30 tablet 5     metoprolol (LOPRESSOR) 50 MG tablet TAKE ONE TABLET BY MOUTH TWICE A  tablet 2     gabapentin (NEURONTIN) 300 MG capsule Increase the Gabapentin to 900 mg three time a day. 270 capsule 3     amoxicillin-clavulanate (AUGMENTIN) 125-31.25 MG/5ML suspension Take 45 mg/kg/day by mouth 2 times daily Reported on 4/28/2017       methocarbamol (ROBAXIN) 500 MG tablet Take 1 tablet (500 mg) by mouth 3 times daily as needed for muscle spasms 90 tablet 1     polyethylene glycol (MIRALAX/GLYCOLAX) powder STIR 17 GM OF POWDER (SEE DEXTER INSIDE CAP) IN 8-OZ OF LIQUID UNTIL COMPLETELY DISSOLVED. DRINK THE SOLUTION DAILY OR AS DIRECTED. 527 g 5      MG capsule TAKE ONE CAPSULE BY MOUTH EVERY  capsule 3     omeprazole (PRILOSEC) 20 MG capsule TAKE ONE CAPSULE BY MOUTH EVERY DAY 90 capsule 2     VITAMIN D3 1000 UNITS tablet TAKE TWO TABLETS BY MOUTH EVERY  tablet 3     cyclobenzaprine (FLEXERIL) 10  "MG tablet Take 1 tablet (10 mg) by mouth At Bedtime 30 tablet 0     zonisamide (ZONEGRAN) 25 MG capsule        Multiple Vitamins-Minerals (I-GOLDIE) TABS TAKE ONE TABLET BY MOUTH EVERY DAY 60 tablet 8     docusate sodium (COLACE) 100 MG tablet Take 100 mg by mouth daily 60 tablet 1     aspirin 81 MG tablet Take 1 tablet (81 mg) by mouth daily (Patient not taking: Reported on 8/29/2017) 30 tablet 11        Physical Exam:  /69 (BP Location: Left arm, Patient Position: Chair, Cuff Size: Adult Regular)  Pulse 72  Temp 97  F (36.1  C) (Temporal)  Resp 16  Ht 1.702 m (5' 7\")  Wt 70.2 kg (154 lb 12.8 oz)  SpO2 99%  BMI 24.25 kg/m2  Wt Readings from Last 12 Encounters:   08/29/17 70.2 kg (154 lb 12.8 oz)   08/28/17 69.9 kg (154 lb)   07/07/17 68.5 kg (151 lb)   04/28/17 67.1 kg (148 lb)   02/28/17 68 kg (150 lb)   01/10/17 68 kg (150 lb)   11/30/16 66.2 kg (146 lb)   09/22/16 66.2 kg (146 lb)   08/30/16 66.5 kg (146 lb 11.2 oz)   08/23/16 66.2 kg (146 lb)   07/07/16 66 kg (145 lb 6.4 oz)   06/21/16 64.4 kg (142 lb)     ECOG performance status: 0  GENERAL APPEARANCE: Healthy, alert and in no acute distress.  HEENT: Sclerae anicteric. PERRLA. Oropharynx without ulcers, lesions, or thrush.  NECK: Supple. No asymmetry or masses.  LYMPHATICS: No palpable cervical, supraclavicular, axillary, or inguinal lymphadenopathy.  RESP: Lungs clear to auscultation bilaterally without rales, rhonchi or wheezes.  BREAST: Scars of bilateral mastectomy and reconstruction without any dominant masses or axillary adenopathy.  CARDIOVASCULAR: Regular rate and rhythm. Normal S1, S2; no S3 or S4. No murmur, gallop, or rub.  ABDOMEN: Soft, nontender. Bowel sounds normal. No palpable organomegaly or masses.  MUSCULOSKELETAL: Extremities without gross deformities noted. No edema of bilateral lower extremities.  SKIN: No suspicious lesions or rashes.  NEURO: Alert and oriented x 3. Cranial nerves II-XII grossly intact.  PSYCHIATRIC: Mentation " and affect appear normal.    Laboratory/Imaging Studies:  No visits with results within 2 Week(s) from this visit.  Latest known visit with results is:    Orders Only on 02/21/2017   Component Date Value Ref Range Status     WBC 02/21/2017 7.6  4.0 - 11.0 10e9/L Final     RBC Count 02/21/2017 4.19  3.8 - 5.2 10e12/L Final     Hemoglobin 02/21/2017 12.7  11.7 - 15.7 g/dL Final     Hematocrit 02/21/2017 38.2  35.0 - 47.0 % Final     MCV 02/21/2017 91  78 - 100 fl Final     MCH 02/21/2017 30.3  26.5 - 33.0 pg Final     MCHC 02/21/2017 33.2  31.5 - 36.5 g/dL Final     RDW 02/21/2017 13.1  10.0 - 15.0 % Final     Platelet Count 02/21/2017 247  150 - 450 10e9/L Final     Diff Method 02/21/2017 Automated Method   Final     % Neutrophils 02/21/2017 47.6  % Final     % Lymphocytes 02/21/2017 41.6  % Final     % Monocytes 02/21/2017 7.4  % Final     % Eosinophils 02/21/2017 3.0  % Final     % Basophils 02/21/2017 0.3  % Final     % Immature Granulocytes 02/21/2017 0.1  % Final     Absolute Neutrophil 02/21/2017 3.6  1.6 - 8.3 10e9/L Final     Absolute Lymphocytes 02/21/2017 3.2  0.8 - 5.3 10e9/L Final     Absolute Monocytes 02/21/2017 0.6  0.0 - 1.3 10e9/L Final     Absolute Eosinophils 02/21/2017 0.2  0.0 - 0.7 10e9/L Final     Absolute Basophils 02/21/2017 0.0  0.0 - 0.2 10e9/L Final     Abs Immature Granulocytes 02/21/2017 0.0  0 - 0.4 10e9/L Final     Sodium 02/21/2017 141  133 - 144 mmol/L Final     Potassium 02/21/2017 3.9  3.4 - 5.3 mmol/L Final     Chloride 02/21/2017 108  94 - 109 mmol/L Final     Carbon Dioxide 02/21/2017 24  20 - 32 mmol/L Final     Anion Gap 02/21/2017 9  3 - 14 mmol/L Final     Glucose 02/21/2017 85  70 - 99 mg/dL Final     Urea Nitrogen 02/21/2017 22  7 - 30 mg/dL Final     Creatinine 02/21/2017 0.71  0.52 - 1.04 mg/dL Final     GFR Estimate 02/21/2017 83  >60 mL/min/1.7m2 Final    Non  GFR Calc     GFR Estimate If Black 02/21/2017   >60 mL/min/1.7m2 Final                     Value:>90   GFR Calc       Calcium 02/21/2017 8.6  8.5 - 10.1 mg/dL Final     Bilirubin Total 02/21/2017 0.3  0.2 - 1.3 mg/dL Final     Albumin 02/21/2017 3.3* 3.4 - 5.0 g/dL Final     Protein Total 02/21/2017 6.6* 6.8 - 8.8 g/dL Final     Alkaline Phosphatase 02/21/2017 66  40 - 150 U/L Final     ALT 02/21/2017 30  0 - 50 U/L Final     AST 02/21/2017 16  0 - 45 U/L Final     CA 27-29 02/21/2017 22  0 - 39 U/mL Final    Assay Method:  Chemiluminescence using Siemens Centaur XP        Recent Results (from the past 744 hour(s))   XR Surgery GLADYS L/T 5 Min Fluoro w Stills    Narrative    This exam was marked as non-reportable because it will not be read by a   radiologist or a [place] non-radiologist provider.                 Assessment and plan:  (C34.90) Malignant neoplasm of lung, unspecified laterality, unspecified part of lung (H)  (primary encounter diagnosis)  Is no clinical evidence of disease recurrence. We will continue to monitor the patient's symptoms. I will see the patient again in 6 months or sooner if there are new developments or concerns in    Plan: CBC with platelets differential, Comprehensive metabolic panel, CBC with platelets differential    (I10) Hypertension goal BP (blood pressure) < 130/80  Patient currently on metoprolol 50 mg orally daily.    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    (C50.811,  Z17.1) Malignant neoplasm of overlapping sites of right breast in female, estrogen receptor negative (H)  There is no clinical evidence of disease recurrence.    (D72.829) Leukocytosis, unspecified type  CBC will be rechecked next week.    The patient is ready to learn, no apparent learning barriers were identified.  Diagnosis and treatment plans were explained to the patient. The patient expressed understanding of the content. The patient asked appropriate questions. The patient questions were answered to her  satisfaction.    Chart documentation with Dragon Voice recognition Software. Although reviewed after completion, some words and grammatical errors may remain.

## 2017-08-29 NOTE — PATIENT INSTRUCTIONS
Please follow up with Dr. Arroyo in 6 months.  Please come 15-30 minutes prior to follow up for labs.    [facility] Date/Time:  9/6/17 at 1:30    Follow Up Date/Time: 2/27/18 at 1:30    If you have any questions or concerns please feel free to call.    Jeffry Smith, RN, BSN   Oncology Care Coordinator RN  [facility]  [phone]

## 2017-08-29 NOTE — NURSING NOTE
"Oncology Rooming Note    August 29, 2017 1:29 PM   Yusra Rivas is a 62 year old female who presents for:    Chief Complaint   Patient presents with     Oncology Clinic Visit     6 month follow up for Malignant neoplasm of right female breast, unspecified site of breast     Results     Labs today     Initial Vitals: /69 (BP Location: Left arm, Patient Position: Chair, Cuff Size: Adult Regular)  Pulse 72  Temp 97  F (36.1  C) (Temporal)  Resp 16  Ht 1.702 m (5' 7\")  Wt 70.2 kg (154 lb 12.8 oz)  SpO2 99%  BMI 24.25 kg/m2 Estimated body mass index is 24.25 kg/(m^2) as calculated from the following:    Height as of this encounter: 1.702 m (5' 7\").    Weight as of this encounter: 70.2 kg (154 lb 12.8 oz). Body surface area is 1.82 meters squared.  No Pain (0) Comment: Data Unavailable   No LMP recorded. Patient is postmenopausal.  Allergies reviewed: Yes  Medications reviewed: Yes    Medications: Medication refills not needed today.  Pharmacy name entered into [employer]: [facility] [place], [place] - [address]    Clinical concerns: Concerned about burn to breast since they are fake. Dr. Arroyo was notified.        Vanessa Almeida MA              "

## 2017-08-30 NOTE — NURSING NOTE
DISCHARGE PLAN:  Next appointments: See patient instruction section  Departure Mode: Ambulatory  Accompanied by: self  20 minutes for nursing discharge (face to face time)     Yusra TONE Rob is here today for Oncology follow up.  Writing nurse seen patient after Medical Oncology appointment to address questions/concerns/coordinate care. Patient to continue with follow up in 6 months with labs prior.  Patient to repeat labs as well next week. Appointments scheduled.  Patient had several questions about follow up and catching the cancer if it comes back.  Reviewed long term follow up and what to expect.  See patient instructions and Oncologist's Progress note for further details. Questions and concerns addressed to patient's satisfaction. Patient verbalized and demonstrated understanding of plan.  Contact information provided and patient is encouraged to call with any that arise in the interim of care.    Jeffry Smith, RN, BSN, OCN   Oncology Care Coordinator [facility]  [phone]  8/30/2017, 4:13 PM

## 2017-09-06 DIAGNOSIS — C34.90 MALIGNANT NEOPLASM OF LUNG, UNSPECIFIED LATERALITY, UNSPECIFIED PART OF LUNG (H): ICD-10-CM

## 2017-09-06 LAB
BASOPHILS # BLD AUTO: 0 10E9/L (ref 0–0.2)
BASOPHILS NFR BLD AUTO: 0.3 %
DIFFERENTIAL METHOD BLD: NORMAL
EOSINOPHIL # BLD AUTO: 0.3 10E9/L (ref 0–0.7)
EOSINOPHIL NFR BLD AUTO: 2.8 %
ERYTHROCYTE [DISTWIDTH] IN BLOOD BY AUTOMATED COUNT: 12.6 % (ref 10–15)
HCT VFR BLD AUTO: 41.5 % (ref 35–47)
HGB BLD-MCNC: 13.7 G/DL (ref 11.7–15.7)
LYMPHOCYTES # BLD AUTO: 3.1 10E9/L (ref 0.8–5.3)
LYMPHOCYTES NFR BLD AUTO: 35 %
MCH RBC QN AUTO: 30.4 PG (ref 26.5–33)
MCHC RBC AUTO-ENTMCNC: 33 G/DL (ref 31.5–36.5)
MCV RBC AUTO: 92 FL (ref 78–100)
MONOCYTES # BLD AUTO: 0.8 10E9/L (ref 0–1.3)
MONOCYTES NFR BLD AUTO: 8.6 %
NEUTROPHILS # BLD AUTO: 4.8 10E9/L (ref 1.6–8.3)
NEUTROPHILS NFR BLD AUTO: 53.3 %
PLATELET # BLD AUTO: 284 10E9/L (ref 150–450)
RBC # BLD AUTO: 4.51 10E12/L (ref 3.8–5.2)
WBC # BLD AUTO: 9 10E9/L (ref 4–11)

## 2017-09-06 PROCEDURE — 85025 COMPLETE CBC W/AUTO DIFF WBC: CPT | Performed by: INTERNAL MEDICINE

## 2017-09-06 PROCEDURE — 36415 COLL VENOUS BLD VENIPUNCTURE: CPT | Performed by: INTERNAL MEDICINE

## 2017-09-12 ENCOUNTER — TRANSFERRED RECORDS (OUTPATIENT)
Dept: HEALTH INFORMATION MANAGEMENT | Facility: CLINIC | Age: 62
End: 2017-09-12

## 2017-09-12 ENCOUNTER — TELEPHONE (OUTPATIENT)
Dept: PALLIATIVE MEDICINE | Facility: CLINIC | Age: 62
End: 2017-09-12

## 2017-09-12 NOTE — TELEPHONE ENCOUNTER
Fax received from [employer]  Pt was seen for a spinal cord stimulator behavioral eval.  The paperwork states that she is a very good candidate for an spinal cord stimulator eval.    Called pt. She would like to proceed to spinal cord stimulator trial.    Routed to Lela Reveles to check insurance coverage for spinal cord stimulator trial.  The behavioral eval notes from [facility] were faxed to Lela.      Approved by interventional team? Not Applicable    Has pt seen Kamaljit Keene LP or other pain psychologist?  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    Vendor:  [employer]    Anticoagulants:  No    Pre-trial IV Antibiotic to be used:  Ancef 2 gms    Other No    Pain Provider:  Dr. Sharri Benites, RN-BSN  [facility]

## 2017-09-12 NOTE — LETTER
"[facility]  [address] [facility]  [place] [address]  [phone]        December 1, 2017    Yusra Rivas                                                                                                                     [address]  [place] [address]      Dear Yusra,    A spinal cord stimulator trial has been scheduled for Monday, 1/22/18 @ 0845 . Arrive at 0800 for IV and antibiotics.  Midtrial adjustment: Tuesday, 1/23/18@ 2pm  Lead removal appointment: Friday, 1/26/18 @ 1:30pm  Vendor: [employer]  A  is needed.  If you are taking a transportation service or taxi cab you will need an adult to accompany you.      You may not be able to drive throughout the   trial  Nothing by mouth for 6 hours before the procedure.    -okay to take meds with sips of water.   Location: [facility]- 2nd floor   [id number] [place] [address] [address] [address]                  For the duration of the trial, patients should not:   - Raise their arms over their heads of move their shoulders beyond 90 degrees in any direction   - Bend, twist, stretch, or lift more than 5 lbs   -  or operate machinery when the stimulator is on, because an unexpected electrical surge can cause distracting sensations and unwanted muscle contractions or spasms.   - Shower or bathe- do not submerge the area or let water directly contact the area where the leads exit.   For the duration of the trial, patients should:   - Position hospital bed to maintain body alignment (keep bed as flat, straight as possible)   - Sleep on a firm mattress that supports their legs and back equally   - Maintain a try trial lead insertion site by limiting bathing to sponge baths. Do not wash the trial lead area at all.   - Obtain physician approval before undergoing any spinal or chiropractic manipulation   - Move shoulders and hips at the same time, in a \"log rolling\" movement that minimizes twisting of the spine.   - Realize " "that because the trial lead will never \"scar into place\" the lead will be more mobile within the spine and is therefore subject to changes with abrupt movements. Small shifts can lead to dramatic changes in coverage, strength of stimulation (too weak or too strong), and would require the representative to interrogate and adjust your settings in person.   - Realize that you do have the option of turning it off if there is a dramatic, uncomfortable stimulation occuring on account of a shifted lead.   ------   Nurse line: [phone]   Appt line: [phone]            "

## 2017-09-14 DIAGNOSIS — Z51.89 ENCOUNTER FOR OTHER SPECIFIED AFTERCARE: ICD-10-CM

## 2017-09-14 RX ORDER — GABAPENTIN 300 MG/1
CAPSULE ORAL
Qty: 270 CAPSULE | Refills: 3 | Status: SHIPPED | OUTPATIENT
Start: 2017-09-14 | End: 2018-01-25

## 2017-09-14 NOTE — TELEPHONE ENCOUNTER
Signed Prescriptions:                        Disp   Refills    gabapentin (NEURONTIN) 300 MG capsule      270 ca*3        Sig: Increase the Gabapentin to 900 mg three time a day.  Authorizing Provider: ANTOINE SU    Reviewed, signed, e-prescribed.    Antoine Narvaez MD  [facility]

## 2017-09-14 NOTE — TELEPHONE ENCOUNTER
Received fax request from [facility] requesting refill(s) for gabapentin (NEURONTIN) 300 MG capsule    Last refilled on 8/7/17    Pt last seen on 7/7/17  Next appt scheduled for NONE    Gio Max MA  [facility]      Will facilitate refill.

## 2017-09-21 NOTE — TELEPHONE ENCOUNTER
Faxed completed PA form and supporting documentation for SCS trail to [facility]. Right fax confirmation 9/20 at 11:45 am      Lela PALACIOS    [facility]

## 2017-09-26 NOTE — TELEPHONE ENCOUNTER
Sending to provider- please give direction. Do you want to send pt to PT?    Abril Culp RN, [facility]  Pain Clinic Care Coordinator

## 2017-09-26 NOTE — TELEPHONE ENCOUNTER
PA denied.  Reason given:      Patient does not meet PA criteria. Not medically necessary. Medical information does not support that this care is needed to treat patients condition.    No documentation of PT ( at least 4 in the last 6 weeks). There is no Oswestery disability index scores. These scores must demonstrate less than 30% improvement   from the first and last therapy visits.  Patient notified:  Yes   Patient informed directly/PT. informed of right to appeal.      Routing to review and advise.      Lela PALACIOS    [facility]

## 2017-09-27 DIAGNOSIS — R25.2 SPASM: ICD-10-CM

## 2017-09-27 DIAGNOSIS — M54.2 NECK PAIN: ICD-10-CM

## 2017-09-27 NOTE — TELEPHONE ENCOUNTER
Patient left VM at 11:04 am      Rx- Hydrocodone 5-325 MG- [facility]      Lela PALACIOS    [facility]

## 2017-09-27 NOTE — TELEPHONE ENCOUNTER
Medication refill information reviewed.     Last due:  Dispense on/after 8/30/17. To start on 9/1/17    Due date:  10/1/17    Weaning instructions:  N/A    Prescriptions prepped for review.     Angélica Benites RN-BSN  [facility]

## 2017-09-27 NOTE — TELEPHONE ENCOUNTER
Yes, lets spend some more WC money and send her for more physical therapy even though she had therapy as recent as 10/16 and have her fill out a new oswestery disability index.    Antoine Narvaez MD  [facility]

## 2017-09-27 NOTE — TELEPHONE ENCOUNTER
Received call from patient requesting refill(s) of HYDROcodone-acetaminophen (NORCO) 5-325 MG per tablet    Last picked up from pharmacy on 9/1/17    Pt last seen by prescribing provider on 7/7/17  Next appt scheduled for 10/3/17    Last urine drug screen date 11/30/16  Current opioid agreement on file (completed within the last year) Yes Date of opioid agreement: 12/21/16    Processing (pick one and delete the others):      Gio Max MA  [facility]    Will route to nursing pool for review and preparation of prescription(s).

## 2017-09-28 NOTE — TELEPHONE ENCOUNTER
Recvd a call back from [employer]. They stated that they were not sure that they were the secondary payors and they thought that they needed a prior auth but they were going to check on that to be 100% sure. They were going to call back by days end to determine if we needed to proceed with an appeal or not.     Abril Culp RN, [facility]  Pain Clinic Care Coordinator

## 2017-09-28 NOTE — TELEPHONE ENCOUNTER
[facility]. [phone] to discuss the case. L/M for the Prior Auth line asking for a C/B to explain why this was denied as  is the secondary payor. Awaiting a CB.     Abril Culp RN, [facility]  Pain Clinic Care Coordinator

## 2017-09-28 NOTE — TELEPHONE ENCOUNTER
[employer] called back and stated they are going to proceed with the fact that a prior auth was needed.     Will send in the note from provider from 4/28/2017 where he stated pt could do home exercises instead of PT. Pt coming in on 10/3/2017 for a f/u appt and so will also send in that note explaining why pt does not need further PT and a new DENISA score as her last 3 scores were from 2016.     Abril Culp RN, [facility]  Pain Clinic Care Coordinator

## 2017-09-29 RX ORDER — HYDROCODONE BITARTRATE AND ACETAMINOPHEN 5; 325 MG/1; MG/1
1 TABLET ORAL EVERY 6 HOURS PRN
Qty: 75 TABLET | Refills: 0 | Status: SHIPPED | OUTPATIENT
Start: 2017-09-29 | End: 2017-11-02

## 2017-09-29 NOTE — TELEPHONE ENCOUNTER
Signed Rx prepped for mail. Will mail from [place] on 9/30/17. Mailing to  Pharmacy [place], [place]. Patient, notified.      Chauncey Flores MA

## 2017-09-29 NOTE — TELEPHONE ENCOUNTER
Signed Prescriptions:                        Disp   Refills    HYDROcodone-acetaminophen (NORCO) 5-325 MG*75 tab*0        Sig: Take 1 tablet by mouth every 6 hours as needed for           pain Max of 3 tablets per day on bad days. This           is a 30 day supply.  Dispense on/after 9/29/17.           To start on 10/1/17  Authorizing Provider: KAROL SHRESTHA    Signed for colleague who is not in office today.   Her UDS is due in November, may wish to collect when she has office appt next week with Dr. Sharri Narvaez.    Signed script placed in touchdown bin at [facility].    Karol Shrestha APRN CNP FNP RN  [facility]

## 2017-10-03 ENCOUNTER — OFFICE VISIT (OUTPATIENT)
Dept: PALLIATIVE MEDICINE | Facility: CLINIC | Age: 62
End: 2017-10-03
Payer: MEDICARE

## 2017-10-03 VITALS
BODY MASS INDEX: 24.96 KG/M2 | WEIGHT: 159 LBS | HEART RATE: 73 BPM | DIASTOLIC BLOOD PRESSURE: 79 MMHG | HEIGHT: 67 IN | SYSTOLIC BLOOD PRESSURE: 123 MMHG

## 2017-10-03 DIAGNOSIS — G89.4 CHRONIC PAIN SYNDROME: ICD-10-CM

## 2017-10-03 DIAGNOSIS — G89.29 CHRONIC NECK PAIN: ICD-10-CM

## 2017-10-03 DIAGNOSIS — M54.2 CHRONIC NECK PAIN: ICD-10-CM

## 2017-10-03 DIAGNOSIS — M47.816 LUMBAR FACET ARTHROPATHY: ICD-10-CM

## 2017-10-03 DIAGNOSIS — F41.1 GENERALIZED ANXIETY DISORDER: Primary | ICD-10-CM

## 2017-10-03 DIAGNOSIS — M43.25 FUSION OF SPINE, THORACOLUMBAR REGION: ICD-10-CM

## 2017-10-03 DIAGNOSIS — M54.16 LUMBAR RADICULOPATHY: ICD-10-CM

## 2017-10-03 DIAGNOSIS — M47.817 LUMBOSACRAL SPONDYLOSIS WITHOUT MYELOPATHY: ICD-10-CM

## 2017-10-03 PROCEDURE — 99214 OFFICE O/P EST MOD 30 MIN: CPT | Performed by: ANESTHESIOLOGY

## 2017-10-03 ASSESSMENT — PAIN SCALES - GENERAL: PAINLEVEL: MODERATE PAIN (4)

## 2017-10-03 NOTE — PROGRESS NOTES
[facility]    Date of visit: 10/3/2017    Chief complaint:   Chief Complaint   Patient presents with     Pain     Follow up        Interval history:  Yusra Rivas was last seen by me on 8/28/17 for lumbar facet joint injections and a right SI joint injection.  Her last office visit was on 7/7/17.      Recommendations/plan at the last visit included:  Plan:  1.  Pain Physical therapy eval and treat - continue with home exercise, self directed exercise  2.  Pain Psychology eval and treat - psychological eval for spinal cord stimulation  3.  Continue Gabapentin 900 mg three times per day for nerve related pain and myofascial pain  4.  Continue Norco 5/325 - maximum 3 times per day on bad days.   5.  Continue Flexeril as needed for muscle spasm and to help with sleep  6. Robaxin 750 mg TID prn spasm  7.  Continue Zonegran for headache  8.  Trigger point injections for myofascial pain performed today - see procedure note - may be double booked in the future  9.  Would benefit from L5-S1 facet injection and sacroiliac joint injection on the right  10.  Will move forward with spinal cord stimulation as a modality to decrease pain and improve function - patient given DVDs today  11.  Follow up 8-10 weeks    Since her last visit, Yusra Rivas reports:  Her injections helped with her lower back pain and her lower back pain is still some better than before.  She continues with pain radiating down the legs bilaterally, right greater than left.  Her back pain seems to be worse on the right as well.  She continues with pain in the back of the neck and shoulders as well but not as bad as her lower back pain.  She also complains of intermittent pain down the arms.  She has a lot of muscle pain all over her body.  Her pain is described as aching, burning, shooting, throbbing, sharp, stabbing, miserable, exhausting, gnawing, nagging, and tiring and is constant in nature.    She has numbness and tingling in  the right leg and it feels like the whole leg gets numb into the foot.  She complains of weakness in the right leg - she has occasional problems with walking stairs.    She has attempted physical therapy and has attended at least five sessions since December 2016 and was not able to continue because it worsened her pain.  She also underwent two Chiropractic treatments in October 2016 that worsened her pain so she did not return.  She underwent lumbar epidural steroid injection on 12/12/16 without relief and then lumbar facet joint injections and right SI joint injection on 8/28/17 that seemed to help for a few weeks or more.    Pain scores:  Pain intensity on average is 5 on a scale of 0-10. Ranges from 3/10 to 8/10    Current pain treatments:   Norco 5/325 one tab BID - occasionally three per day  Gabapentin 300 mg 3 capsules TID  Flexeril 10 mg at bedtime prn  Robaxin 750 mg TID - as needed  Zonegran 25 mg QHS    Past pain treatments:  Indomethacin - GI problems  Percocet - no different than hydrocodone  Relafen - made heart race  Norco 5/325 one tab BID  Gabapentin 300 mg 3 capsules TID  Flexeril 10 mg at bedtime  Robaxin 750 mg TID  Zonegran 25 mg QHS    Side Effects: no side effect    Medications:  Current Outpatient Prescriptions   Medication Sig Dispense Refill     HYDROcodone-acetaminophen (NORCO) 5-325 MG per tablet Take 1 tablet by mouth every 6 hours as needed for pain Max of 3 tablets per day on bad days. This is a 30 day supply.  Dispense on/after 9/29/17. To start on 10/1/17 75 tablet 0     gabapentin (NEURONTIN) 300 MG capsule Increase the Gabapentin to 900 mg three time a day. 270 capsule 3     metoprolol (LOPRESSOR) 50 MG tablet TAKE ONE TABLET BY MOUTH TWICE A  tablet 2     amoxicillin-clavulanate (AUGMENTIN) 125-31.25 MG/5ML suspension Take 45 mg/kg/day by mouth 2 times daily Reported on 4/28/2017       methocarbamol (ROBAXIN) 500 MG tablet Take 1 tablet (500 mg) by mouth 3 times daily as  "needed for muscle spasms 90 tablet 1     polyethylene glycol (MIRALAX/GLYCOLAX) powder STIR 17 GM OF POWDER (SEE DEXTER INSIDE CAP) IN 8-OZ OF LIQUID UNTIL COMPLETELY DISSOLVED. DRINK THE SOLUTION DAILY OR AS DIRECTED. 527 g 5      MG capsule TAKE ONE CAPSULE BY MOUTH EVERY  capsule 3     omeprazole (PRILOSEC) 20 MG capsule TAKE ONE CAPSULE BY MOUTH EVERY DAY 90 capsule 2     VITAMIN D3 1000 UNITS tablet TAKE TWO TABLETS BY MOUTH EVERY  tablet 3     cyclobenzaprine (FLEXERIL) 10 MG tablet Take 1 tablet (10 mg) by mouth At Bedtime 30 tablet 0     zonisamide (ZONEGRAN) 25 MG capsule        Multiple Vitamins-Minerals (I-GOLDIE) TABS TAKE ONE TABLET BY MOUTH EVERY DAY 60 tablet 8     docusate sodium (COLACE) 100 MG tablet Take 100 mg by mouth daily 60 tablet 1     cyclobenzaprine (FLEXERIL) 10 MG tablet TAKE ONE TABLET BY MOUTH AT BEDTIME (Patient not taking: Reported on 10/3/2017) 30 tablet 5     aspirin 81 MG tablet Take 1 tablet (81 mg) by mouth daily (Patient not taking: Reported on 8/29/2017) 30 tablet 11       Medical History: any changes in medical history since they were last seen? No    Review of Systems:  The 14 system ROS was reviewed from the intake questionnaire, and is positive for: vision changes, swelling in feet, abdominal pain, joint pain, stiffness, back pain, neck pain, weakness, numbness, tingling, stress  Any bowel or bladder problems: denies changes  Mood: good    Physical Exam:  /79  Pulse 73  Ht 1.702 m (5' 7\")  Wt 72.1 kg (159 lb)  BMI 24.9 kg/m2  Constitutional: alert and no distress.  Pt is well nourished, well developed  Head: Normocephalic. No masses, lesions, tenderness or abnormalities  ENT: EOMI, mucosal surfaces moist.  Neck with full ROM, posture fair  Cardiovascular: No edema or JVD appreciated.  Respiratory: Good diaphragmatic excursion. No wheezes appreciated.  Speaking in complete sentences without shortness of breath.  No accessory muscle use. "   Musculoskeletal: extremities normal- no gross deformities noted, normal muscle tone and able to move about the exam room without difficulty.    Skin: no suspicious lesions or rashes appreciated on exposed areas  Neurologic: Gait mildly forward flexed, favors right lower extremity. Moving all extremities spontaneously, no apparent weakness.    Psychiatric: mentation appears normal, affect full and good eye contact.      Cervical Spine:  Good range of motion, tender paraspinals, exam limited  Lumbar Spine:  Well healed thoracolumbar surgical scar, Mild lumbar tenderness along the paraspinal muscles, flexes approximately 90 degrees with pulling sensation in the lower back, lateral bend equivocal for facet loading pain, seated straight leg raise mildly positive right    LUMBAR SPINE TWO TO THREE VIEWS  8/31/2016 1:01 PM      HISTORY: Lumbago with sciatica, right side     COMPARISON: None.     FINDINGS: There are five non-rib bearing lumbar type vertebrae. Long  segment posterior spinal fusion is noted with posterior spinal fusion  rods and pedicle screws extending from T11 through L5 without evidence  for hardware failure. Compression fractures of L2 and L3 are noted and  are of indeterminate age. There is disc height loss at L1-L2, L2-L3  and at L3-L4. Levoconvex curvature of the lumbar spine is centered at  L2. Cholecystectomy clips are noted in the right upper abdomen.  Atherosclerotic calcifications are seen in the abdominal aorta.         IMPRESSION:   1. Posterior spinal fusion hardware from T11 through L5.  2. Superior endplate compression fractures of L2 and L3 of  indeterminate age. These are likely chronic.  3. Disc height loss is seen at multiple levels as described above.  4. Aortic atherosclerosis.    Assessment:   1.  Chronic pain syndrome  2.  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  Diffuse myofascial pain (muscle pain)  4.  Muscle spasm/dystonia  5.  History of closed head injury  6.   Lumbar radiculopathy (radiating pain)  7.  Depression/anxiety  8.  Right foot drop  9.  Proximal lower extremity weakness  10.  Chronic headache  11.  History of breast cancer, lung cancer    Yusra Rivas is a 62 year old female who is seen at the pain clinic for chronic lower back pain with pain down the right greater than left lower extremity.  She did get some relief of her back pain with the recent injections but she has continued with her right lower extremity pain.  The Spinal cord stimulator was denied due to lack of documentation of physical therapy and Oswestry score and this is being corrected and resubmitted.  Our treatment plan is as outlined below.    Plan:  1. Physical Therapy:  Deferred - patient has undergone multiple recent physical therapy sessions within the past year with increased pain.  She has also undergone Chiropractic treatments with increased pain.  Continue home self directed exercise as tolerated  2. Clinical Health Psychologist to address issues of relaxation, behavioral change, coping style, and other factors important to improvement.  Deferred - coping well at this time  3. Diagnostic Studies:  Not indicated at this time  4. Medication Management:    1. Continue Gabapentin 900 mg TID  2. Continue Norco 5/325 - maximum 3 per day on bad days  3. Continue Flexeril as needed for muscle spasm and sleep  4. Continue Robaxin 750 mg TID prn spasm  5. Continue Zonegran for headache  5. Further procedures recommended:   1. Proceed with SCS trial once approved - Psychological eval is completed and she was found to be a good candidate  2. May repeat Trigger point injections for muscles spasm when needed  3. May repeat lumbar facet joint and SI joint injection if needed  6. Recommendations to PCP: continue current treatment  7. Follow up: for procedure and in 10-12 weeks    Total time spent was 30 minutes, and more than 50% of face to face time was spent in counseling and/or coordination of care  regarding diagnosis, self directed exercise, medication management, and interventional procedures.    Antoine Narvaez MD  [facility]

## 2017-10-03 NOTE — PATIENT INSTRUCTIONS
Assessment:   1.  Chronic pain syndrome  2.  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  Diffuse myofascial pain (muscle pain)  4.  Muscle spasm/dystonia  5.  History of closed head injury  6.  Lumbar radiculopathy (radiating pain)  7.  Depression/anxiety  8.  Right foot drop  9.  Proximal lower extremity weakness  10.  Chronic headache  11.  History of breast cancer, lung cancer      Plan:  1. Physical Therapy:  Deferred - patient has undergone multiple physical therapy sessions with increased pain.  She has also undergone Chiropractic treatments with increased pain.  Continue home self directed exercise as tolerated  2. Clinical Health Psychologist to address issues of relaxation, behavioral change, coping style, and other factors important to improvement.  Deferred - coping well at this time  3. Diagnostic Studies:  Not indicated at this time  4. Medication Management:    1. Continue Gabapentin 900 mg TID  2. Continue Norco 5/325 - maximum 3 per day on bad days  3. Continue Flexeril as needed for muscle spasm and sleep  4. Continue Robaxin 750 mg TID prn spasm  5. Continue Zonegran for headache  5. Further procedures recommended:   1. Proceed with SCS trial once approved - Psychological eval is completed and she was found to be a good candidate  2. May repeat Trigger point injections for muscles spasm when needed  3. May repeat lumbar facet joint and SI joint injection if needed  6. Recommendations to PCP: continue current treatment  7. Follow up: for procedure and in 10-12 weeks      ----------------------------------------------------------------  Nurse Triage line:  [phone]   Call this number with any questions or concerns. You may leave a detailed message anytime. Calls are typically returned Monday through Friday between 8 AM and 4:30 PM. We usually get back to you within 2 business days depending on the issue/request.       Medication refills:    For non-narcotic medications, call your  pharmacy directly to request a refill. The pharmacy will contact the [facility] for authorization. Please allow 3-4 days for these refills to be processed.     For narcotic refills, call the nurse triage line or send a [employer] message. Please contact us 7-10 days before your refill is due. The message MUST include the name of the specific medication(s) requested and how you would like to receive the prescription(s). The options are as follows:    Pain Clinic staff can mail the prescription to your pharmacy. Please tell us the name of the pharmacy.    You may pick the prescription up at the Pain Clinic (tell us the location) or during a clinic visit with your pain provider    Pain Clinic staff can deliver the prescription to the [facility] pharmacy in the clinic building. Please tell us the location.      Scheduling number: [phone].  Call this number to schedule or change appointments.    We believe regular attendance is key to your success in our program.    Any time you are unable to keep your appointment we ask that you call us at least 24 hours in advance to let us know. This will allow us to offer the appointment time to another patient.

## 2017-10-03 NOTE — NURSING NOTE
"Chief Complaint   Patient presents with     Pain     Follow up        Initial /79  Pulse 73  Ht 1.702 m (5' 7\")  Wt 72.1 kg (159 lb)  BMI 24.9 kg/m2 Estimated body mass index is 24.9 kg/(m^2) as calculated from the following:    Height as of this encounter: 1.702 m (5' 7\").    Weight as of this encounter: 72.1 kg (159 lb).  Medication Reconciliation: complete     Chauncey Flores MA      "

## 2017-10-03 NOTE — TELEPHONE ENCOUNTER
Per pt and Dr. Sharri Narvaez Medicare is primary therefore insurance coverage needs to start there.    She has done PT recently with Dheeraj Lara.    She also did acupuncture X2.    Please resubmit with the additional notes.  Pt saw Dr. Sharri Narvaez today in clinic.    Please call pt if you have any further questions.    Routed to Lela Reveles.    Angélica Benites, RN-BSN  [facility]

## 2017-10-04 NOTE — TELEPHONE ENCOUNTER
Appeal faxed to , right fax confirmation 10/4 at 9 am    Office note from 4/28/17 and 10/3/17 was submitted for appeal.      Lela PALACIOS    [facility]

## 2017-10-12 ENCOUNTER — HOSPITAL ENCOUNTER (EMERGENCY)
Facility: CLINIC | Age: 62
Discharge: HOME | End: 2017-10-13
Attending: FAMILY MEDICINE | Admitting: FAMILY MEDICINE
Payer: MEDICARE

## 2017-10-12 VITALS
TEMPERATURE: 97.8 F | DIASTOLIC BLOOD PRESSURE: 74 MMHG | HEIGHT: 67 IN | HEART RATE: 73 BPM | OXYGEN SATURATION: 98 % | WEIGHT: 155 LBS | RESPIRATION RATE: 16 BRPM | BODY MASS INDEX: 24.33 KG/M2 | SYSTOLIC BLOOD PRESSURE: 139 MMHG

## 2017-10-12 DIAGNOSIS — M79.18 LUMBAR MUSCLE PAIN: ICD-10-CM

## 2017-10-12 DIAGNOSIS — M79.10 MYALGIA: ICD-10-CM

## 2017-10-12 PROCEDURE — 86140 C-REACTIVE PROTEIN: CPT | Performed by: FAMILY MEDICINE

## 2017-10-12 PROCEDURE — 85025 COMPLETE CBC W/AUTO DIFF WBC: CPT | Performed by: FAMILY MEDICINE

## 2017-10-12 PROCEDURE — 25000132 ZZH RX MED GY IP 250 OP 250 PS 637: Mod: GY | Performed by: FAMILY MEDICINE

## 2017-10-12 PROCEDURE — 99284 EMERGENCY DEPT VISIT MOD MDM: CPT | Performed by: FAMILY MEDICINE

## 2017-10-12 PROCEDURE — 81003 URINALYSIS AUTO W/O SCOPE: CPT | Performed by: FAMILY MEDICINE

## 2017-10-12 PROCEDURE — 99284 EMERGENCY DEPT VISIT MOD MDM: CPT | Mod: Z6 | Performed by: FAMILY MEDICINE

## 2017-10-12 PROCEDURE — A9270 NON-COVERED ITEM OR SERVICE: HCPCS | Mod: GY | Performed by: FAMILY MEDICINE

## 2017-10-12 RX ORDER — LIDOCAINE 50 MG/G
1 PATCH TOPICAL ONCE
Status: COMPLETED | OUTPATIENT
Start: 2017-10-12 | End: 2017-10-12

## 2017-10-12 RX ADMIN — LIDOCAINE 1 PATCH: 50 PATCH CUTANEOUS at 23:49

## 2017-10-12 NOTE — ED AVS SNAPSHOT
[facility] Emergency Department    [address] [place] [address]    Phone:  [phone]    Fax:  [phone]                                       Yusra Rivas   MRN: [id number]    Department:  [facility] Emergency Department   Date of Visit:  10/12/2017           Patient Information     Date Of Birth          1955        Your diagnoses for this visit were:     Myalgia     Lumbar muscle pain        You were seen by Dheeraj Malave DO.      Follow-up Information     Follow up with Munir Lynn DO.    Specialty:  Internal Medicine    Why:  if not improved in 3-5 days    Contact information:    [address]   [place] [address] [phone]          Discharge Instructions       Please read and follow the handout(s) instructions. Return, if needed, for increased or worsening symptoms and as directed by the handout(s).    You may use the patch daily over the area of pain if needed.    I hope you feel better soon and can find a dentist to help with your dental issues.    Electronically signed, Dheeraj Malave DO      Discharge References/Attachments     MYALGIAS (ENGLISH)      Future Appointments        Provider Department Dept Phone Center    1/15/2018 3:00 PM Antoine Narvaez MD [facility] [phone] [facility]    2/27/2018 1:30 PM Sandra Arroyo MD [facility] [phone] [facility]      24 Hour Appointment Hotline       To make an appointment at any [facility], call [phone] ([phone]). If you don't have a family doctor or clinic, we will help you find one. [facility] are conveniently located to serve the needs of you and your family.             Review of your medicines      START taking        Dose / Directions Last dose taken    lidocaine 5 % Patch   Commonly known as:  LIDODERM   Dose:  1 patch   Quantity:  7 patch        Place 1 patch onto the skin every 24 hours Use over the  area of muscle pain or spasm   Refills:  0          Our records show that you are taking the medicines listed below. If these are incorrect, please call your family doctor or clinic.        Dose / Directions Last dose taken    aspirin 81 MG tablet   Dose:  81 mg   Quantity:  30 tablet        Take 1 tablet (81 mg) by mouth daily   Refills:  11        CEPHALEXIN PO   Dose:  500 mg        Take 500 mg by mouth 3 times daily   Refills:  0        cholecalciferol 1000 UNIT tablet   Commonly known as:  vitamin D   Quantity:  200 tablet        TAKE TWO TABLETS BY MOUTH EVERY DAY   Refills:  3        * cyclobenzaprine 10 MG tablet   Commonly known as:  FLEXERIL   Dose:  10 mg   Quantity:  30 tablet        Take 1 tablet (10 mg) by mouth At Bedtime   Refills:  0        * cyclobenzaprine 10 MG tablet   Commonly known as:  FLEXERIL   Quantity:  30 tablet        TAKE ONE TABLET BY MOUTH AT BEDTIME   Refills:  5        * docusate sodium 100 MG tablet   Commonly known as:  COLACE   Dose:  100 mg   Quantity:  60 tablet        Take 100 mg by mouth daily   Refills:  1        *  MG capsule   Quantity:  100 capsule   Generic drug:  docusate sodium        TAKE ONE CAPSULE BY MOUTH EVERY DAY   Refills:  3        gabapentin 300 MG capsule   Commonly known as:  NEURONTIN   Quantity:  270 capsule        Increase the Gabapentin to 900 mg three time a day.   Refills:  3        HYDROcodone-acetaminophen 5-325 MG per tablet   Commonly known as:  NORCO   Dose:  1 tablet   Quantity:  75 tablet        Take 1 tablet by mouth every 6 hours as needed for pain Max of 3 tablets per day on bad days. This is a 30 day supply.  Dispense on/after 9/29/17. To start on 10/1/17   Refills:  0        methocarbamol 500 MG tablet   Commonly known as:  ROBAXIN   Dose:  500 mg   Quantity:  90 tablet        Take 1 tablet (500 mg) by mouth 3 times daily as needed for muscle spasms   Refills:  1        metoprolol 50 MG tablet   Commonly known as:  LOPRESSOR    Quantity:  180 tablet        TAKE ONE TABLET BY MOUTH TWICE A DAY   Refills:  2        multivitamin Tabs per tablet   Quantity:  60 tablet        TAKE ONE TABLET BY MOUTH EVERY DAY   Refills:  8        omeprazole 20 MG CR capsule   Commonly known as:  priLOSEC   Quantity:  90 capsule        TAKE ONE CAPSULE BY MOUTH EVERY DAY   Refills:  2        polyethylene glycol powder   Commonly known as:  MIRALAX/GLYCOLAX   Quantity:  527 g        STIR 17 GM OF POWDER (SEE DEXTER INSIDE CAP) IN 8-OZ OF LIQUID UNTIL COMPLETELY DISSOLVED. DRINK THE SOLUTION DAILY OR AS DIRECTED.   Refills:  5        zonisamide 25 MG capsule   Commonly known as:  Zonegran        Refills:  0        * Notice:  This list has 4 medication(s) that are the same as other medications prescribed for you. Read the directions carefully, and ask your doctor or other care provider to review them with you.            Prescriptions were sent or printed at these locations (1 Prescription)                   Other Prescriptions                Printed at Department/Unit printer (1 of 1)         lidocaine (LIDODERM) 5 % Patch                Procedures and tests performed during your visit     CBC with platelets differential    CRP inflammation    UA reflex to Microscopic and Culture      Orders Needing Specimen Collection     None      Pending Results     No orders found for last 3 day(s).            Pending Culture Results     No orders found for last 3 day(s).            Pending Results Instructions     If you had any lab results that were not finalized at the time of your Discharge, you can call the ED Lab Result RN at [phone]. You will be contacted by this team for any positive Lab results or changes in treatment. The nurses are available 7 days a week from 10A to 6:30P.  You can leave a message 24 hours per day and they will return your call.        Thank you for choosing [facility]       Thank you for choosing [facility] for your care. Our goal is always to  "provide you with excellent care. Hearing back from our patients is one way we can continue to improve our services. Please take a few minutes to complete the written survey that you may receive in the mail after you visit with us. Thank you!        [facility] Information     [facility] lets you send messages to your doctor, view your test results, renew your prescriptions, schedule appointments and more. To sign up, go to [email or web address] . Click on \"Log in\" on the left side of the screen, which will take you to the Welcome page. Then click on \"Sign up Now\" on the right side of the page.     You will be asked to enter the access code listed below, as well as some personal information. Please follow the directions to create your username and password.     Your access code is: [email or web address]  Expires: 2017  1:29 PM     Your access code will  in 90 days. If you need help or a new code, please call your [facility] clinic or [phone].        Care EveryWhere ID     This is your Care EveryWhere ID. This could be used by other organizations to access your [facility] medical records  [id number]        Equal Access to Services     ROSEMARY EDUARDO : Hadii jeff Rosado, waezra champagne, qajonah cohenalgerogina east, trisha wilson. So [facility] [phone].    ATENCIÓN: Si habla español, tiene a ruiz disposición servicios gratuitos de asistencia lingüística. Jean al [phone].    We comply with applicable federal civil rights laws and [place] laws. We do not discriminate on the basis of race, color, national origin, age, disability, sex, sexual orientation, or gender identity.            After Visit Summary       This is your record. Keep this with you and show to your community pharmacist(s) and doctor(s) at your next visit.                  "

## 2017-10-12 NOTE — ED AVS SNAPSHOT
[facility] Emergency Department    [address] [place] [address]    Phone:  [phone]    Fax:  [phone]                                       Yusra Rivas   MRN: [id number]    Department:  [facility] Emergency Department   Date of Visit:  10/12/2017           After Visit Summary Signature Page     I have received my discharge instructions, and my questions have been answered. I have discussed any challenges I see with this plan with the nurse or doctor.    ..........................................................................................................................................  Patient/Patient Representative Signature      ..........................................................................................................................................  Patient Representative Print Name and Relationship to Patient    ..................................................               ................................................  Date                                            Time    ..........................................................................................................................................  Reviewed by Signature/Title    ...................................................              ..............................................  Date                                                            Time

## 2017-10-13 LAB
ALBUMIN UR-MCNC: NEGATIVE MG/DL
APPEARANCE UR: CLEAR
BASOPHILS # BLD AUTO: 0.1 10E9/L (ref 0–0.2)
BASOPHILS NFR BLD AUTO: 0.6 %
BILIRUB UR QL STRIP: NEGATIVE
COLOR UR AUTO: YELLOW
CRP SERPL-MCNC: 3.8 MG/L (ref 0–8)
DIFFERENTIAL METHOD BLD: NORMAL
EOSINOPHIL # BLD AUTO: 0.3 10E9/L (ref 0–0.7)
EOSINOPHIL NFR BLD AUTO: 2.4 %
ERYTHROCYTE [DISTWIDTH] IN BLOOD BY AUTOMATED COUNT: 13.2 % (ref 10–15)
GLUCOSE UR STRIP-MCNC: NEGATIVE MG/DL
HCT VFR BLD AUTO: 44.6 % (ref 35–47)
HGB BLD-MCNC: 14.7 G/DL (ref 11.7–15.7)
HGB UR QL STRIP: NEGATIVE
IMM GRANULOCYTES # BLD: 0 10E9/L (ref 0–0.4)
IMM GRANULOCYTES NFR BLD: 0.2 %
KETONES UR STRIP-MCNC: NEGATIVE MG/DL
LEUKOCYTE ESTERASE UR QL STRIP: NEGATIVE
LYMPHOCYTES # BLD AUTO: 4 10E9/L (ref 0.8–5.3)
LYMPHOCYTES NFR BLD AUTO: 37.3 %
MCH RBC QN AUTO: 30.4 PG (ref 26.5–33)
MCHC RBC AUTO-ENTMCNC: 33 G/DL (ref 31.5–36.5)
MCV RBC AUTO: 92 FL (ref 78–100)
MONOCYTES # BLD AUTO: 1 10E9/L (ref 0–1.3)
MONOCYTES NFR BLD AUTO: 9.1 %
NEUTROPHILS # BLD AUTO: 5.4 10E9/L (ref 1.6–8.3)
NEUTROPHILS NFR BLD AUTO: 50.4 %
NITRATE UR QL: NEGATIVE
PH UR STRIP: 6 PH (ref 5–7)
PLATELET # BLD AUTO: 293 10E9/L (ref 150–450)
RBC # BLD AUTO: 4.84 10E12/L (ref 3.8–5.2)
SOURCE: NORMAL
SP GR UR STRIP: 1.01 (ref 1–1.03)
UROBILINOGEN UR STRIP-MCNC: 0 MG/DL (ref 0–2)
WBC # BLD AUTO: 10.7 10E9/L (ref 4–11)

## 2017-10-13 RX ORDER — LIDOCAINE 50 MG/G
1 PATCH TOPICAL EVERY 24 HOURS
Qty: 7 PATCH | Refills: 0 | Status: SHIPPED | OUTPATIENT
Start: 2017-10-13 | End: 2017-12-05

## 2017-10-13 ASSESSMENT — ENCOUNTER SYMPTOMS
MYALGIAS: 1
FLANK PAIN: 1
CHILLS: 0
BACK PAIN: 1
DYSURIA: 0
HEMATURIA: 0
CARDIOVASCULAR NEGATIVE: 1
NEUROLOGICAL NEGATIVE: 1
RESPIRATORY NEGATIVE: 1
FREQUENCY: 0
FEVER: 0
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
## 2017-10-13 NOTE — TELEPHONE ENCOUNTER
Helen from [employer] calling, they are needing the Oswestry disability index. The one that is from her first visit may be used as the medical policy does not state that it needs to be within a certain amount of time, however there is no result % at the end of the form.     Routing to nursing to review    Once received please route back to Lela to fax to Helen at [phone], her phone number is [phone].        Lela PALACIOS    [facility]

## 2017-10-13 NOTE — DISCHARGE INSTRUCTIONS
Please read and follow the handout(s) instructions. Return, if needed, for increased or worsening symptoms and as directed by the handout(s).    You may use the patch daily over the area of pain if needed.    I hope you feel better soon and can find a dentist to help with your dental issues.    Electronically signed, Dheeraj Malave DO

## 2017-10-16 NOTE — TELEPHONE ENCOUNTER
Oswestry disability index was faxed to Helen right fax confirmation 10/16 at 9:50 am.        Lela PALACIOS    [facility]

## 2017-10-24 NOTE — TELEPHONE ENCOUNTER
Incoming fax from , they received the appeal and working on it. They will have a response within 30 days.       Lela PALACIOS    [facility]

## 2017-11-01 DIAGNOSIS — R25.2 SPASM: ICD-10-CM

## 2017-11-01 DIAGNOSIS — M54.2 NECK PAIN: ICD-10-CM

## 2017-11-01 NOTE — TELEPHONE ENCOUNTER
Patient left VM at 10:47 am    Rx- Hydrocodone 5-325, please mail to [facility] pharmacy      Lela PALACIOS    [facility]

## 2017-11-01 NOTE — TELEPHONE ENCOUNTER
Incoming fax from , they have approved the SCS trial.    Routing to nursing to schedule.      Lela PALACIOS    [facility]

## 2017-11-02 RX ORDER — HYDROCODONE BITARTRATE AND ACETAMINOPHEN 5; 325 MG/1; MG/1
1 TABLET ORAL EVERY 6 HOURS PRN
Qty: 75 TABLET | Refills: 0 | Status: SHIPPED | OUTPATIENT
Start: 2017-11-02 | End: 2017-11-29

## 2017-11-02 NOTE — TELEPHONE ENCOUNTER
Received call from patient requesting refill(s) of Hydrocodone    Last picked up from pharmacy on 10/4/2017  Due date 11/3/2017    Pt last seen on 10/3/2018  Next appt scheduled for 1/15/2018    Last urine drug screen 11/30/2016  Current opioid agreement on file Yes Date: 12/21/2016    Processing (pick one):      Mail to [facility] pharmacy     Will facilitate refill.    Abril Culp RN, [facility]  Pain Clinic Care Coordinator

## 2017-11-02 NOTE — TELEPHONE ENCOUNTER
Script placed in the mail. Attempted to call pt and person who answered stated she was not home. Did not L/M.     Abril Culp RN, [facility]  Pain Clinic Care Coordinator

## 2017-11-02 NOTE — TELEPHONE ENCOUNTER
Signed Prescriptions:                        Disp   Refills    HYDROcodone-acetaminophen (NORCO) 5-325 MG*75 tab*0        Sig: Take 1 tablet by mouth every 6 hours as needed for           pain Max of 3 tablets per day on bad days. This           is a 30 day supply.  Dispense and start on           11/3/17  Authorizing Provider: ANTOINE SU    Reviewed, printed, signed in [place].  Processed by nursing.    Antoine Narvaez MD  [facility]

## 2017-11-06 ENCOUNTER — TELEPHONE (OUTPATIENT)
Dept: PALLIATIVE MEDICINE | Facility: CLINIC | Age: 62
End: 2017-11-06

## 2017-11-06 NOTE — TELEPHONE ENCOUNTER
Patient left VM at 10:46 am    The entire message was cutting out, please call patient.       Lela PALACIOS    [facility]

## 2017-11-07 NOTE — TELEPHONE ENCOUNTER
Patient stated her message was just asking if her pain meds were filled and stated that they were and they were sent out back on November 2nd. She stated she will call her pharmacy.     Abril Culp RN, [facility]  Pain Clinic Care Coordinator

## 2017-11-09 ENCOUNTER — TRANSFERRED RECORDS (OUTPATIENT)
Dept: HEALTH INFORMATION MANAGEMENT | Facility: CLINIC | Age: 62
End: 2017-11-09

## 2017-11-16 DIAGNOSIS — E56.9 VITAMIN DEFICIENCY: Primary | ICD-10-CM

## 2017-11-16 DIAGNOSIS — C50.911 BREAST CANCER, RIGHT (H): ICD-10-CM

## 2017-11-16 DIAGNOSIS — C34.91: ICD-10-CM

## 2017-11-16 DIAGNOSIS — Z00.00 ROUTINE GENERAL MEDICAL EXAMINATION AT A HEALTH CARE FACILITY: ICD-10-CM

## 2017-11-17 ENCOUNTER — TRANSFERRED RECORDS (OUTPATIENT)
Dept: HEALTH INFORMATION MANAGEMENT | Facility: CLINIC | Age: 62
End: 2017-11-17

## 2017-11-17 NOTE — TELEPHONE ENCOUNTER
Requested Prescriptions   Pending Prescriptions Disp Refills     multivitamin (I-GOLDIE) TABS per tablet [Pharmacy Med Name: I-GOLDIE  TABS] 60 tablet 8     Sig: TAKE ONE TABLET BY MOUTH EVERY DAY    Vitamin Supplements (Adult) Protocol Failed    11/16/2017  5:01 PM       Failed - Recent or future visit with authorizing provider's specialty    Patient had office visit in the last year or has a visit in the next 30 days with authorizing provider.  See chart review.              Passed - High dose Vitamin D not ordered       Passed - Patient is age 18 or older

## 2017-11-20 RX ORDER — I-VITE, TAB 1000-60-2MG (60/BT) 300MCG-200
TAB ORAL
Qty: 30 TABLET | Refills: 0 | Status: SHIPPED | OUTPATIENT
Start: 2017-11-20 | End: 2017-12-05

## 2017-11-20 NOTE — TELEPHONE ENCOUNTER
Routing refill request to provider for review/approval because:  Patient needs to be seen because it has been more than 1 year since last office visit.    Gabi Navarro RN  [facility]

## 2017-11-22 NOTE — TELEPHONE ENCOUNTER
Called pt.    She was unable to schedule at the time as she was cooking for family.  She said to call back next week.  Offered her spinal cord stimulator trial dates of 12/4/17 and 1/22/18.  Pt cannot do December. She would like the trial for 1/22/18.      Dates for trial as follows:      Spinal cord stimulator trial:  Monday, 1/22/18 @ [id number] location    Mid-trial adjustment:  Tuesday, 1/23/18 @ 1400-[facility] location, nurse visit only    Spinal cord stimulator trial lead pull:  Friday, 1/26/18 @ 1330-[facility] location, nurse visit only. Dr. Smith here if needed.    Note:  The above dates have not been scheduled into epic.    Will call pt back next week.    Angélica Benites, RN-BSN  [facility]

## 2017-11-28 ENCOUNTER — TELEPHONE (OUTPATIENT)
Dept: FAMILY MEDICINE | Facility: CLINIC | Age: 62
End: 2017-11-28

## 2017-11-28 NOTE — TELEPHONE ENCOUNTER
11/28/2017    Call Regarding Preventive Health Screening Colonoscopy and Cervical/PAP    Attempt 1    Message on voicemail     Comments: manual dial      Outreach   Catherine Shine

## 2017-11-29 DIAGNOSIS — R25.2 SPASM: ICD-10-CM

## 2017-11-29 DIAGNOSIS — M54.2 NECK PAIN: ICD-10-CM

## 2017-11-29 NOTE — TELEPHONE ENCOUNTER
Patient left VM at 12:10 pm    Rx- Hydrocodone 5-325,  Would like this mailed (the rest of the message was cut out)      Lela PALACIOS    [facility] [place]

## 2017-11-29 NOTE — TELEPHONE ENCOUNTER
Received call from patient requesting refill(s) of HYDROcodone-acetaminophen (NORCO) 5-325 MG per tablet    Last picked up from pharmacy on 11/08/17    Pt last seen by prescribing provider on 10/03/17  Next appt scheduled for 01/15/17    Last urine drug screen date 11/30/16- note added to next appt  Current opioid agreement on file (completed within the last year) Yes Date of opioid agreement: 12/21/16- note added to next appt    Processing (pick one and delete the others):      Mail to [facility] pharmacy   [address] [place] [address]    Will route to nursing [place] for review and preparation of prescription(s).

## 2017-11-29 NOTE — TELEPHONE ENCOUNTER
Medication refill information reviewed.     Last due:  Dispense and start on 11/3/17.  But it was filled on 11/8/17    Due date:  12/8/17    Weaning instructions:  N/A    Prescriptions prepped for review.     Angélica Benites RN-BSN  [facility]

## 2017-11-30 RX ORDER — HYDROCODONE BITARTRATE AND ACETAMINOPHEN 5; 325 MG/1; MG/1
1 TABLET ORAL EVERY 6 HOURS PRN
Qty: 75 TABLET | Refills: 0 | Status: SHIPPED | OUTPATIENT
Start: 2017-11-30 | End: 2017-11-30

## 2017-11-30 RX ORDER — HYDROCODONE BITARTRATE AND ACETAMINOPHEN 5; 325 MG/1; MG/1
1 TABLET ORAL EVERY 6 HOURS PRN
Qty: 75 TABLET | Refills: 0 | Status: SHIPPED | OUTPATIENT
Start: 2017-11-30 | End: 2017-12-27

## 2017-11-30 NOTE — TELEPHONE ENCOUNTER
Signed Rx will mail from [place] on 12/01/17. Mailing to [facility]. Patient was informed.

## 2017-11-30 NOTE — TELEPHONE ENCOUNTER
Signed Prescriptions:                        Disp   Refills    HYDROcodone-acetaminophen (NORCO) 5-325 MG*75 tab*0        Sig: Take 1 tablet by mouth every 6 hours as needed for           pain Max of 3 tablets per day on bad days. This           is a 30 day supply.  Dispense on/after 12/6/17.            To start on 12/8/17  Authorizing Provider: ANTOINE SU    Reviewed, printed, signed in [place].  Placed in MA basket for processing.    Antoine Narvaez MD  [facility]

## 2017-11-30 NOTE — TELEPHONE ENCOUNTER
Signed Prescriptions:                        Disp   Refills    HYDROcodone-acetaminophen (NORCO) 5-325 MG*75 tab*0        Sig: Take 1 tablet by mouth every 6 hours as needed for           pain Max of 3 tablets per day on bad days. This           is a 30 day supply.  Dispense on/after 12/6/17.            To start on 12/8/17  Authorizing Provider: ANTOINE SU    Reprinted in [place] due to earlier printer failure.  Signed and given to MA for processing.    Antoine Narvaez MD  [facility]

## 2017-12-01 NOTE — TELEPHONE ENCOUNTER
Pre-screening for [facility]    Procedure ordered? spinal cord stimulator trial  Is  Needed?: No  Will Pt have a ?  Yes if pt is given sedation meds, no driving for 24 hours.  Is pt taking a cab or transportation service?   No   If so will need to be accompanied by an adult too (friend/family member) in order for IV sedation to be given.    Per [facility] Policy:  Outpatients are to have responsible adult or family member to accompany them at discharge and drive them home. A service providing medically trained drivers or attendants would be acceptable. Public transportation would not be acceptable unless the patient is accompanied by a responsible adult or family member.  See below info about further driving instructions during trial.     Is Pt aware to be NPO for 6 hours before procedure?  YES  Is pt on any blood thinners? No   Aspirin?  No    0mg/day  Pt was informed to hold their daily aspirin 7 days before procedure? N/A   Take normal medications with sips of water, except for blood thinners.  Yes  Does Pt have an allergy to contrast dye, iodine or shellfish?  No  Any allergies to specific antibiotics?   No Be sure to review if allergy to PCN.  Another antibiotic may need to be ordered. Touch base with provider.  Is Pt diabetic?   No  If on insulin have them bring their gluometer  Is Pt on antibiotics?   No if you develop an infection between now and trial date contact us via nurse line, it may have to be rescheduled.  Does Pt have a bleeding or clotting disorder?  No   Any chance of Pregnancy?  NO  You will need to arrive 1- 1.5 hours before your procedure time for IV start, IV antibiotics and to meet with the vendor.  reviewed  Any complications from sedation medications?  n/a  Does pt have any cardiac issues?  arrythmia  Hx of sleep apnea?  no  Vendor?  [employer]:  [email or web address]   or      Spinal cord stimulator trial date: 1/22/18@ 0845  Spinal cord  "stimulator mid-trial adjustment appointment with just vendor rep:  18 @ 6828   post lead removal appointment: 18@ 5790  Was patient sent instructions via email/fax/mail?  Yes  See letters.  Mailed to pt.  For the duration of the trial, patients should not:   - Raise their arms over their heads of move their shoulders beyond 90 degrees in any direction   - Bend, twist, stretch, or lift more than 5 lbs   -  or operate machinery when the stimulator is on, because an unexpected electrical surge can cause distracting sensations and unwanted muscle contractions or spasms.   - Shower or bathe- do not submerge the area or let water directly contact the area where the leads exit.   For the duration of the trial, patients should:   - Position hospital bed to maintain body alignment (keep bed as flat, straight as possible)   - Sleep on a firm mattress that supports their legs and back equally   - Maintain a try trial lead insertion site by limiting bathing to sponge baths. Do not wash the trial lead area at all.   - Obtain physician approval before undergoing any spinal or chiropractic manipulation   - Move shoulders and hips at the same time, in a \"log rolling\" movement that minimizes twisting of the spine.   - Realize that because the trial lead will never \"scar into place\" the lead will be more mobile within the spine and is therefore subject to changes with abrupt movements. Small shifts can lead to dramatic changes in coverage, strength of stimulation (too weak or too strong), and would require the representative to interrogate and adjust your settings in person.   - Realize that you do have the option of turning it off if there is a dramatic, uncomfortable stimulation occuring on account of a shifted lead.     The following email was sent to vendor:   Procedure: SCS trial   Patient:  Yusra Rivas   :  55  Dx:  Chronic pain syndrome,  Chronic post fusion lower back pain (T11-L5 fusion)    Spinal " cord stimulator trial:  Monday, 1/22/18 @ 5270-2019- Pt arriving at 0800    Mid-trial adjustment:  Tuesday, 1/23/18 @ 1400-    Spinal cord stimulator trial lead pull:  Friday, 1/26/18 @ 1330 with nursing.  Dr. Sharri Narvaez not here.  Dr. Smith here if needed.  Location for all: [facility]-2nd floor     [address] [place] [address]   Providers: Dr. Sharri Benites, RN-BSN  [facility]

## 2017-12-05 ENCOUNTER — OFFICE VISIT (OUTPATIENT)
Dept: INTERNAL MEDICINE | Facility: CLINIC | Age: 62
End: 2017-12-05
Payer: MEDICARE

## 2017-12-05 VITALS
BODY MASS INDEX: 25.37 KG/M2 | TEMPERATURE: 98 F | HEART RATE: 82 BPM | RESPIRATION RATE: 16 BRPM | WEIGHT: 162 LBS | OXYGEN SATURATION: 100 % | SYSTOLIC BLOOD PRESSURE: 136 MMHG | DIASTOLIC BLOOD PRESSURE: 80 MMHG

## 2017-12-05 DIAGNOSIS — J34.89 SINUS DRAINAGE: ICD-10-CM

## 2017-12-05 DIAGNOSIS — Z85.118 H/O MALIGNANT NEOPLASM OF LUNG: Primary | ICD-10-CM

## 2017-12-05 DIAGNOSIS — J39.2 THROAT IRRITATION: ICD-10-CM

## 2017-12-05 DIAGNOSIS — Z85.3 PERSONAL HISTORY OF MALIGNANT NEOPLASM OF BREAST: ICD-10-CM

## 2017-12-05 DIAGNOSIS — E04.1 THYROID NODULE: ICD-10-CM

## 2017-12-05 PROCEDURE — 99214 OFFICE O/P EST MOD 30 MIN: CPT | Performed by: INTERNAL MEDICINE

## 2017-12-05 ASSESSMENT — PAIN SCALES - GENERAL: PAINLEVEL: MODERATE PAIN (4)

## 2017-12-05 NOTE — NURSING NOTE
"Chief Complaint   Patient presents with     Establish Care     multiple health issues       Initial /80  Pulse 82  Temp 98  F (36.7  C) (Temporal)  Resp 16  Wt 162 lb (73.5 kg)  SpO2 100%  BMI 25.37 kg/m2 Estimated body mass index is 25.37 kg/(m^2) as calculated from the following:    Height as of 10/12/17: 5' 7\" (1.702 m).    Weight as of this encounter: 162 lb (73.5 kg).  Medication Reconciliation: complete    "
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
## 2017-12-05 NOTE — MR AVS SNAPSHOT
After Visit Summary   12/5/2017    Yusra Rivas    MRN: [id number]           Patient Information     Date Of Birth          1955        Visit Information        Provider Department      12/5/2017 3:00 PM Colby Beckett MD [facility]         Follow-ups after your visit        Your next 10 appointments already scheduled     Onur 15, 2018  3:00 PM CST   Return Visit with Antoine Narvaez MD   [facility] [place] ([facility] [place])    [address]  [place] [address]   [phone]            Jan 22, 2018  8:45 AM CST   Radiology Injections with Antoine Narvaez MD   [facility] [place] ([facility] [place])    [address]  [place] [address]   [phone]            Jan 23, 2018  2:00 PM CST   Nurse Only with Bg Pain Nurse   [facility] [place] ([facility] [place])    [address]  [place] [address]   [phone]            Jan 26, 2018  1:30 PM CST   Nurse Only with Bg Pain Nurse   [facility] [place] ([facility] [place])    [address]  [place] [address]   [phone]            Feb 27, 2018  1:30 PM CST   Return Visit with Sandra Arroyo MD   [facility] ([facility])    [address] [id number] [phone]              Who to contact     If you have questions or need follow up information about today's clinic visit or your schedule please contact [facility] directly at [phone].  Normal or non-critical lab and imaging results will be communicated to you by MyChart, letter or phone within 4 business days after the clinic has received the results. If you do not hear from us within 7 days, please contact the clinic through MyChart or phone. If you have a critical or abnormal lab result, we will notify you by phone as soon as possible.  Submit refill  "requests through [employer] or call your pharmacy and they will forward the refill request to us. Please allow 3 business days for your refill to be completed.          Additional Information About Your Visit        [place] [employer] Information     [employer] lets you send messages to your doctor, view your test results, renew your prescriptions, schedule appointments and more. To sign up, go to [email or web address] . Click on \"Log in\" on the left side of the screen, which will take you to the Welcome page. Then click on \"Sign up Now\" on the right side of the page.     You will be asked to enter the access code listed below, as well as some personal information. Please follow the directions to create your username and password.     Your access code is: [email or web address]  Expires: 3/5/2018  3:13 PM     Your access code will  in 90 days. If you need help or a new code, please call your [place] clinic or [phone].        Care EveryWhere ID     This is your Care EveryWhere ID. This could be used by other organizations to access your [place] medical records  [id number]        Your Vitals Were     Pulse Temperature Respirations Pulse Oximetry BMI (Body Mass Index)       82 98  F (36.7  C) (Temporal) 16 100% 25.37 kg/m2        Blood Pressure from Last 3 Encounters:   17 136/80   10/12/17 139/74   10/03/17 123/79    Weight from Last 3 Encounters:   17 162 lb (73.5 kg)   10/12/17 155 lb (70.3 kg)   10/03/17 159 lb (72.1 kg)              Today, you had the following     No orders found for display       Primary Care Provider Office Phone # Fax #    Munir Kamaljit Lynn [phone]       [address] [place] [address]        Equal Access to Services     [facility] ALESHA : Luca Rosado, waaxda luqadaha, qaybta kaalmada adezahida, trisha wilson. [facility] [phone].    ATENCIÓN: Si habla español, tiene a ruiz disposición servicios gratuitos de " asistencia lingüística. Jean al [phone].    We comply with applicable federal civil rights laws and [place] laws. We do not discriminate on the basis of race, color, national origin, age, disability, sex, sexual orientation, or gender identity.            Thank you!     Thank you for choosing [facility]  for your care. Our goal is always to provide you with excellent care. Hearing back from our patients is one way we can continue to improve our services. Please take a few minutes to complete the written survey that you may receive in the mail after your visit with us. Thank you!             Your Updated Medication List - Protect others around you: Learn how to safely use, store and throw away your medicines at www.disposemymeds.org.          This list is accurate as of: 12/5/17  3:13 PM.  Always use your most recent med list.                   Brand Name Dispense Instructions for use Diagnosis    cholecalciferol 1000 UNIT tablet    vitamin D3    200 tablet    TAKE TWO TABLETS BY MOUTH EVERY DAY    Routine general medical examination at a health care facility       gabapentin 300 MG capsule    NEURONTIN    270 capsule    Increase the Gabapentin to 900 mg three time a day.    Encounter for other specified aftercare       HYDROcodone-acetaminophen 5-325 MG per tablet    NORCO    75 tablet    Take 1 tablet by mouth every 6 hours as needed for pain Max of 3 tablets per day on bad days. This is a 30 day supply.  Dispense on/after 12/6/17.  To start on 12/8/17    Spasm, Neck pain       metoprolol 50 MG tablet    LOPRESSOR    180 tablet    TAKE ONE TABLET BY MOUTH TWICE A DAY    Hypertension goal BP (blood pressure) < 130/80       multivitamin Tabs per tablet     60 tablet    TAKE ONE TABLET BY MOUTH EVERY DAY    Routine history and physical examination of adult       VITAMIN B-12 PO      Take by mouth daily        VITAMIN B6 PO      Take by mouth daily

## 2017-12-26 ENCOUNTER — TRANSFERRED RECORDS (OUTPATIENT)
Dept: HEALTH INFORMATION MANAGEMENT | Facility: CLINIC | Age: 62
End: 2017-12-26

## 2017-12-27 DIAGNOSIS — R25.2 SPASM: ICD-10-CM

## 2017-12-27 DIAGNOSIS — M54.2 NECK PAIN: ICD-10-CM

## 2017-12-27 NOTE — TELEPHONE ENCOUNTER
Medication refill information reviewed.     Last due:  Dispense on/after 12/6/17.  To start on 12/8/17    Due date:  1/7/18    Weaning instructions:  N/A    Prescriptions prepped for review.     Angélica Benites RN-BSN  [facility]

## 2017-12-27 NOTE — TELEPHONE ENCOUNTER
VM left today at: 12:48 pm      Reason for call:  Medication   If this is a refill request, has the caller requested the refill from the pharmacy already? N/A  Will the patient be using a [facility]? Yes  Name of the pharmacy and phone number for the current request: [facility] [place], [place] - [address]      Name of the medication requested: HYDROcodone-acetaminophen (NORCO) 5-325 MG per tablet     Other request: Pt would like rx sent to [facility] pharmacy        Phone number to reach patient:  Home number on file [phone] (home

## 2017-12-27 NOTE — TELEPHONE ENCOUNTER
Received call from patient requesting refill(s) of HYDROcodone-acetaminophen (NORCO) 5-325 MG per tablet    Last picked up from pharmacy on 12/07/17    Pt last seen by prescribing provider on 10/03/17  Next appt scheduled for 01/15/18- Note to update UDS and OA on appt note    Last urine drug screen date 11/30/16  Current opioid agreement on file (completed within the last year) No Date of opioid agreement: 12/21/16    Processing (pick one and delete the others):      Mail to [facility] [facility]   [address] [facility] [address]    Will route to nursing [place] for review and preparation of prescription(s).

## 2017-12-28 RX ORDER — HYDROCODONE BITARTRATE AND ACETAMINOPHEN 5; 325 MG/1; MG/1
1 TABLET ORAL EVERY 6 HOURS PRN
Qty: 75 TABLET | Refills: 0 | Status: SHIPPED | OUTPATIENT
Start: 2017-12-28 | End: 2018-01-26

## 2017-12-28 NOTE — TELEPHONE ENCOUNTER
I have reviewed Dr Sharri Narvaez's last office note dated 10/3/17. Please advise patient that she will not receive any further opiate prescriptions until annual UDS is completed after this one. Prescription signed and given to RN for processing.     CHEKO Sotelo, NP-C  [facility]

## 2017-12-28 NOTE — TELEPHONE ENCOUNTER
The signed norco scripts(s) were mailed to     [facility] [place] - [address]   [address] [facility] [address]  Phone: [phone] Fax: [phone]    Called pt.  And relayed the above.      Angélica Benites, RN-BSN  [facility]

## 2018-01-09 NOTE — TELEPHONE ENCOUNTER
Per outreach, patient is aware of overdue screening and will call on own time for scheduling.     Thanks      Outreach ,  Jia Gonzalez

## 2018-01-18 DIAGNOSIS — C50.911 BREAST CANCER, RIGHT (H): ICD-10-CM

## 2018-01-18 DIAGNOSIS — C34.91: ICD-10-CM

## 2018-01-18 DIAGNOSIS — K59.01 SLOW TRANSIT CONSTIPATION: Primary | ICD-10-CM

## 2018-01-18 DIAGNOSIS — Z00.00 ROUTINE GENERAL MEDICAL EXAMINATION AT A HEALTH CARE FACILITY: ICD-10-CM

## 2018-01-18 DIAGNOSIS — E56.9 VITAMIN DEFICIENCY: ICD-10-CM

## 2018-01-18 NOTE — TELEPHONE ENCOUNTER
"Requested Prescriptions   Pending Prescriptions Disp Refills      MG capsule [Pharmacy Med Name: DOK 100MG CAPS] 100 capsule 3     Sig: TAKE ONE CAPSULE BY MOUTH EVERY DAY    Laxatives Protocol Passed    1/18/2018 11:48 AM       Passed - Patient is age 6 or older       Passed - Recent or future visit with authorizing provider's specialty    Patient had office visit in the last year or has a visit in the next 30 days with authorizing provider.  See \"Patient Info\" tab in inbasket, or \"Choose Columns\" in Meds & Orders section of the refill encounter.             multivitamin (I-GOLDIE) TABS per tablet [Pharmacy Med Name: I-GOLDIE  TABS] 60 tablet 0     Sig: TAKE ONE TABLET BY MOUTH EVERY DAY --NEED OFFICE VISIT FOR FURTHER REFILLS    Vitamin Supplements (Adult) Protocol Passed    1/18/2018 11:48 AM       Passed - High dose Vitamin D not ordered       Passed - Recent or future visit with authorizing provider's specialty    Patient had office visit in the last year or has a visit in the next 30 days with authorizing provider.  See \"Patient Info\" tab in inbasket, or \"Choose Columns\" in Meds & Orders section of the refill encounter.               Last Written Prescription Date:  9-25-15  Last Fill Quantity: 60,  # refills: 8   Last Office Visit with G, P or [facility] prescribing provider:  12/5/17   Future Office Visit:    Next 5 appointments (look out 90 days)     Jan 23, 2018  2:00 PM CST   Nurse Only with Bg Pain Nurse   [facility] ([facility])    [address] [address]   [phone]            Jan 26, 2018  1:30 PM CST   Nurse Only with Bg Pain Nurse   [facility] ([facility])    [address] [address]   [phone]            Feb 27, 2018  1:30 PM CST   Return Visit with Sandra Arroyo MD   [facility]    [address] " [id number] [phone]

## 2018-01-19 RX ORDER — DOCUSATE SODIUM 100 MG/1
CAPSULE, LIQUID FILLED ORAL
Qty: 100 CAPSULE | Refills: 3 | Status: SHIPPED | OUTPATIENT
Start: 2018-01-19 | End: 2019-01-10

## 2018-01-19 RX ORDER — I-VITE, TAB 1000-60-2MG (60/BT) 300MCG-200
1 TAB ORAL DAILY
Qty: 90 TABLET | Refills: 1 | Status: SHIPPED | OUTPATIENT
Start: 2018-01-19 | End: 2018-09-12

## 2018-01-19 NOTE — TELEPHONE ENCOUNTER
DOK  Routing refill request to provider for review/approval because:  Drug not active on patient's medication list    Multi vitamins  Routing refill request to provider for review/approval because:  A break in medication, has not been prescribed since 2015    Gabi Navarro RN  [facility]

## 2018-01-22 ENCOUNTER — RADIOLOGY INJECTION OFFICE VISIT (OUTPATIENT)
Dept: PALLIATIVE MEDICINE | Facility: CLINIC | Age: 63
End: 2018-01-22
Payer: MEDICARE

## 2018-01-22 ENCOUNTER — RADIANT APPOINTMENT (OUTPATIENT)
Dept: RADIOLOGY | Facility: CLINIC | Age: 63
End: 2018-01-22
Attending: ANESTHESIOLOGY
Payer: MEDICARE

## 2018-01-22 VITALS
TEMPERATURE: 98.1 F | SYSTOLIC BLOOD PRESSURE: 105 MMHG | HEART RATE: 70 BPM | OXYGEN SATURATION: 100 % | DIASTOLIC BLOOD PRESSURE: 61 MMHG

## 2018-01-22 DIAGNOSIS — M96.1 FAILED BACK SURGICAL SYNDROME: ICD-10-CM

## 2018-01-22 DIAGNOSIS — M54.41 CHRONIC BILATERAL LOW BACK PAIN WITH BILATERAL SCIATICA: ICD-10-CM

## 2018-01-22 DIAGNOSIS — Z79.2 PREVENTIVE ANTIBIOTIC: Primary | ICD-10-CM

## 2018-01-22 DIAGNOSIS — G89.4 CHRONIC PAIN SYNDROME: ICD-10-CM

## 2018-01-22 DIAGNOSIS — M54.42 CHRONIC BILATERAL LOW BACK PAIN WITH BILATERAL SCIATICA: ICD-10-CM

## 2018-01-22 DIAGNOSIS — G89.29 CHRONIC BILATERAL LOW BACK PAIN WITH BILATERAL SCIATICA: ICD-10-CM

## 2018-01-22 PROCEDURE — 63650 IMPLANT NEUROELECTRODES: CPT | Mod: 59 | Performed by: ANESTHESIOLOGY

## 2018-01-22 RX ORDER — CEFAZOLIN SODIUM 1 G
2 VIAL (EA) INJECTION ONCE
Qty: 2 G | Refills: 0 | OUTPATIENT
Start: 2018-01-22 | End: 2018-01-22

## 2018-01-22 ASSESSMENT — PAIN SCALES - GENERAL: PAINLEVEL: SEVERE PAIN (7)

## 2018-01-22 NOTE — PROGRESS NOTES
Date of Procedure:  1/21/2018  Pre-procedure diagnosis:  Chronic pain syndrome, chronic post fusion lower back pain (T11-L5 fusion), failed back surgical syndrome  Post-procedure diagnosis:  Same  Procedure:  Spinal Cord Stimulator trial, fluoroscopically guided  Indication:  Diagnostic procedure for intractable failed back surgery pain  Monitors:  Automatic blood pressure cuff, pulse oximetry, and ECG  Medications:  none  Complications:  none    Physician:  MD Layne Valladares MD (pain fellow)    Brief history:  Yusra Rivas is a 62 year old year old female who presents today for a Spinal Cord Stimulator trial.  She has a history of chronic, intractable post fusion lower back pain (T11-L5) and bilateral lower extremity pain, right greater than left, that has not responded to conservative measures including PT, medications, and injections.  Due to the failure of conservative treatments, the patient has elected to proceed with spinal cord stimulation as a treatment modality to decrease pain and improve function as well as to allow for decreased need for pain medications.  The patient has undergone psychological evaluation and has been found to be a good candidate for this form of treatment.    Vitals:    01/22/18 0804 01/22/18 1049   BP: 106/69 105/61   Pulse: 67 70   Temp: 98.1  F (36.7  C)    TempSrc: Oral    SpO2:  100%     Pre-procedure pain:  8/10    Description of Procedure:  The patient's medical history, medications, and allergies were reviewed and reconciled.  The patient denied the use of any blood thinner, recent infection, new neurological or constitutional problems, or the use of any illicit substances or nonprescribed medications or drugs.    The risks, indications, benefits, and alternative treatments were discussed with the patient including, but not limited to bleeding, infection, and nerve injury and the patient verbalized understanding and wished to proceed.  The  patient had a responsible adult available to drive them home following the procedure.    A 20 gauge IV saline lock was placed prior to entering the procedure room and was maintained throughout the procedure.  The patient was given an IV prophylactic antibiotic of Ancef  2 g over 30 minutes prior to the procedure start.    After obtaining signed informed consent, the patient was taken to the procedure room and positioned in the prone position with pillows beneath their abdomen and chest to place their thoracic and lumbar spine in a slightly flexed position.  A Pause for the Cause was performed with concurrence.      AP fluoroscopic views were obtained to identify the midline position of the T12 and L1 spinous processes, as well as the pedicle of L2 on the left as a Tuohy needle entry site.  The skin and subcutaneous tissues were anesthetized with Lidocaine 1% 2 ml first with a 30 gauge 1 inch needle, then utilizing a 27 gauge 3.5 inch spinal needle for deep tissue anesthesia.  Then a small nick was made in the Tuohy needle entry site with an 18 gauge needle.  A 14 gauge Tuohy needle was then advanced utilizing a left paramedian approach towards midline at T12-L1 at an approximate 45 degree angle utilizing air for loss of resistance and intermittent AP and Lateral fluoroscopic guidance.  The epidural space was encountered on the first pass without difficulty.  Aspiration was negative for blood or CSF.    Then, a Medtronic 60 cm Vectris 1x8 spinal cord stimulator lead ([id number]; Lot # [id number]) was advanced through the Tuohy needle into the epidural space and advanced under direct fluoroscopic visualization left of the  midline, with the tip of the stimulating lead being aligned at the base of the T7 vertebral body.    AP fluoroscopic views were obtained to identify the midline position of the T11  And T12 spinous processes, as well as the pedicle of L1 on the right as a Tuohy needle entry site.  The skin and  subcutaneous tissues were anesthetized with Lidocaine 1% 3 ml first with a 30 gauge 1 inch needle, then utilizing a 27 gauge 3.5 inch spinal needle for deep tissue anesthesia.  Then a small nick was made in the Tuohy needle entry site with an 18 gauge needle.  A 14 gauge Tuohy needle was then advanced utilizing a right paramedian approach towards midline at T11-T12 at an approximate 45 degree angle utilizing air for loss of resistance and intermittent AP and Lateral fluoroscopic guidance.  The epidural space was encountered on the first pass without difficulty.  Aspiration was negative for blood or CSF.    Then, a Envis 60 cm Vectris 1x8  spinal cord stimulator lead ([id number]; lot # [id number]) was advanced through the Tuohy needle into the epidural space and advanced under direct fluoroscopic visualization right of the  midline, with the tip of the stimulating lead being aligned at the base of the T7 vertebral body.      At this point, a temporary transmitter was connected to the end of the stimulating lead and stimulator testing was performed with the assistance of the Medtronic device representative.  The patient reported an excellent pattern of capture covering the right and left lower back and lower extremities and covering their entire distribution of pain at an acceptable voltage.    Then, after verifying the position of the tips of the stimulator lead, the stimulator lead stylettes were withdrawn and the Tuohy needles were then withdrawn at which time the position of the stimulator leads were confirmed to be in the same location at the base of the T7 vertebral body.  The skin was then painted with Mastisol and once it was tacky, the leads were secured to the skin with Steri-Strips.  This was then covered with a large Tegaderm with complete coverage.  This was then secured in place with medipore tape.    The temporary Transmitter was attached and secured to the patients right flank as well.  The patient  was then taken to the recovery area for continued observation.  The patient tolerated the procedure well without complications.  Their vital signs remained stable throughout the procedure and during the recovery period.    The Spinal Cord Stimulator device representative then activated and programmed the stimulator to cover the patient's pain distribution.  They also instructed the patient on the stimulator use during the trial and explained activity restrictions and precautions such as avoiding getting the area wet or sudden movements of the neck, back, or extremities, and avoidance of reaching overhead.  The patient also received an informational pamphlet detailing additional precautions.    The patient was given instructions to contact the clinic if there are any complications such as signs of infection at the needle entry site, fever, chills, increasing neck or back pain, or bleeding from the site.  The patient verbalized understanding of the instructions.  They were scheduled to return to the clinic for a follow-up in approximately 5 days to assess the efficacy of the trial and for removal of the stimulator lead.    The patient was discharged to home in good condition after meeting discharge criteria.    Post-procedure pain:  2/10 with discomfort at the injection sites    Antoine Narvaez MD  [facility]

## 2018-01-22 NOTE — MR AVS SNAPSHOT
After Visit Summary   1/22/2018    Yusra Rivas    MRN: [id number]           Patient Information     Date Of Birth          1955        Visit Information        Provider Department      1/22/2018 8:45 AM Antoine Ibrahim MD [facility] [place]        Today's Diagnoses     Preventive antibiotic    -  1      Care Instructions    [facility]   Procedure Discharge Instructions    Today you saw:  Dr. Antoine Narvaez    You had an:  Spinal Cord Stimulator Trial    Check the incision area often and make sure it is sealed. If at anytime you are concerned about redness/drainage then contact the clinic right away.   Nurse line: [phone]     If it is after business hours call the after hours on call [facility]  and ask for the pain provider on-call: [phone]    Spinal Cord Stimulator Trial Information and Instructions:   Dr. Sharri Narvaez performed your trial today, you were given Antibiotics prior to the procedure; Ancef 2 milligrams IV.  You were also given the following medications to help with the procedure:   - Lidocaine 1%  The stimulator lead tip is located at superior aspect of T7  We plan to see you back at the clinic on 1/23/2018 and 1/26/2018.     For the duration of the trial, patients should not:   - Raise their arms over their heads of move their shoulders beyond 90 degrees in any direction   - Bend, twist, stretch, or lift more than 5 lbs   - Drive or operate machinery when the stimulator is on, because an unexpected electrical surge can cause distracting sensations and unwanted muscle contractions or spasms.   - Do not Shower or bathe- do not submerge the area or let water directly contact the area where the leads exit. Sponge bathe only.     For the duration of the trial, patients should:   - Position your bed to maintain body alignment (keep bed as flat, straight as possible)   - Sleep on a firm mattress that supports your legs and back  "equally    - Obtain physician approval before undergoing any spinal or chiropractic manipulation   - Move shoulders and hips at the same time, in a \"log rolling\" movement that minimizes twisting of the spine.   - Realize that because the trial lead will never \"scar into place\" the lead will be more mobile within the spine and is therefore subject to changes with abrupt movements. Small shifts can lead to dramatic changes in coverage, strength of stimulation (too weak or too strong), and would require the representative to adjust your settings in person.   - Realize that you do have the option of turning it off if there is a dramatic, uncomfortable stimulation occuring on account of a shifted lead.     The following instructions can be helpful if you are experiencing a change while you are at home:   - If stimulation increases when you bend your neck back (look up) or lean back or when you sit or lay down, try decreasing the simulation by lowering the amplitude   - If stimulation decreases when you stand up, try increasing the amplitude   - Stimulation may stop when you bend your neck forward or lean forward. Stimulation will resume when you assume a different position. If this occurs too quickly when you move, try increasing the amplitude slightly   - To smooth out the sensation, adjust the rate of stimulation until you feel comfortable.   - Turn the neurostimulator off or the amplitude down when changing positions or adjustments   - If the stimulation is uncomfortable at any time, turn the neurostimulator off.     During the trial, we are interested to note the following:   - Are there any areas of your pain that are not covered, or lose coverage as the trial proceeds?   - Are there areas that are getting stimulated that do not need to be, or are annoying or bothersome?   - Are there certain body positions that cause the coverage to be lost, or cause stimulation to be too intense   - Can you walk 50% more distance " "than before   - Can you take 50% less medication and still get relief   - Do you have a reduction in side effects because you are taking less medications.   - What are your pre and post pain levels      Nurse line: [phone]   Appt line: [phone]            Follow-ups after your visit        Your next 10 appointments already scheduled     Jan 23, 2018  2:00 PM CST   Nurse Only with Bg Pain Nurse   [facility] ([facility])    [address]  [place] [id number] [phone]            Jan 26, 2018  1:30 PM CST   Nurse Only with Bg Pain Nurse   [facility] ([facility])    [address] [id number] [phone]            Feb 27, 2018  1:30 PM CST   Return Visit with Sandra Arroyo MD   [facility]    [address] [place] [id number] [phone]              Who to contact     If you have questions or need follow up information about today's clinic visit or your schedule please contact [facility] directly at [phone].  Normal or non-critical lab and imaging results will be communicated to you by MyChart, letter or phone within 4 business days after the clinic has received the results. If you do not hear from us within 7 days, please contact the clinic through [employer] or phone. If you have a critical or abnormal lab result, we will notify you by phone as soon as possible.  Submit refill requests through [employer] or call your pharmacy and they will forward the refill request to us. Please allow 3 business days for your refill to be completed.          Additional Information About Your Visit        [employer] Information     [employer] lets you send messages to your doctor, view your test results, renew your prescriptions, schedule appointments and more. To sign up, go to [email or web address] . Click on \"Log in\" on the left side of " "the screen, which will take you to the Welcome page. Then click on \"Sign up Now\" on the right side of the page.     You will be asked to enter the access code listed below, as well as some personal information. Please follow the directions to create your username and password.     Your access code is: [email or web address]  Expires: 3/5/2018  3:13 PM     Your access code will  in 90 days. If you need help or a new code, please call your [facility] clinic or [phone].        Care EveryWhere ID     This is your Care EveryWhere ID. This could be used by other organizations to access your [facility] medical records  [id number]        Your Vitals Were     Pulse Temperature                67 98.1  F (36.7  C) (Oral)           Blood Pressure from Last 3 Encounters:   18 106/69   17 136/80   10/12/17 139/74    Weight from Last 3 Encounters:   17 73.5 kg (162 lb)   10/12/17 70.3 kg (155 lb)   10/03/17 72.1 kg (159 lb)              Today, you had the following     No orders found for display       Primary Care Provider Office Phone # Fax #    [facility] Kamaljit Lynn [phone]       [address] [place] [address]        Equal Access to Services     ROSEMARY EDUARDO : Hadii aad ku hadasho Soomaali, waaxda luqadaha, qaybta kaalmada adeegyada, trisha wilson. So [facility] [phone].    ATENCIÓN: Si habla español, tiene a ruiz disposición servicios gratuitos de asistencia lingüística. Llame al [phone].    We comply with applicable federal civil rights laws and [place] laws. We do not discriminate on the basis of race, color, national origin, age, disability, sex, sexual orientation, or gender identity.            Thank you!     Thank you for choosing [facility]  for your care. Our goal is always to provide you with excellent care. Hearing back from our patients is one way we can continue to improve our services. Please take a few minutes to complete " the written survey that you may receive in the mail after your visit with us. Thank you!             Your Updated Medication List - Protect others around you: Learn how to safely use, store and throw away your medicines at www.disposemymeds.org.          This list is accurate as of: 1/22/18 10:51 AM.  Always use your most recent med list.                   Brand Name Dispense Instructions for use Diagnosis    cholecalciferol 1000 UNIT tablet    vitamin D3    200 tablet    TAKE TWO TABLETS BY MOUTH EVERY DAY    Routine general medical examination at a health care facility        MG capsule   Generic drug:  docusate sodium     100 capsule    TAKE ONE CAPSULE BY MOUTH EVERY DAY    Slow transit constipation       gabapentin 300 MG capsule    NEURONTIN    270 capsule    Increase the Gabapentin to 900 mg three time a day.    Encounter for other specified aftercare       HYDROcodone-acetaminophen 5-325 MG per tablet    NORCO    75 tablet    Take 1 tablet by mouth every 6 hours as needed for pain Max of 3 tablets per day on bad days. This is a 30 day supply.  Dispense on/after 1/5/18.  To start on 1/7/18    Spasm, Neck pain       metoprolol tartrate 50 MG tablet    LOPRESSOR    180 tablet    TAKE ONE TABLET BY MOUTH TWICE A DAY    Hypertension goal BP (blood pressure) < 130/80       * multivitamin Tabs per tablet     60 tablet    TAKE ONE TABLET BY MOUTH EVERY DAY    Routine history and physical examination of adult       * multivitamin Tabs per tablet     90 tablet    Take 1 tablet by mouth daily    Vitamin deficiency       VITAMIN B-12 PO      Take by mouth daily        VITAMIN B6 PO      Take by mouth daily        * Notice:  This list has 2 medication(s) that are the same as other medications prescribed for you. Read the directions carefully, and ask your doctor or other care provider to review them with you.

## 2018-01-22 NOTE — NURSING NOTE
20 gauge Peripheral IV inserted into left hand - attempts: 1    Abril Culp RN, [facility]  Pain Clinic Care Coordinator

## 2018-01-22 NOTE — PATIENT INSTRUCTIONS
"[facility]   Procedure Discharge Instructions    Today you saw:  Dr. Antoine Narvaez    You had an:  Spinal Cord Stimulator Trial    Check the incision area often and make sure it is sealed. If at anytime you are concerned about redness/drainage then contact the clinic right away.   Nurse line: [phone]     If it is after business hours call the after hours on call [facility]  and ask for the pain provider on-call: [phone]    Spinal Cord Stimulator Trial Information and Instructions:   Dr. Sharri Narvaez performed your trial today, you were given Antibiotics prior to the procedure; Ancef 2 milligrams IV.  You were also given the following medications to help with the procedure:   - Lidocaine 1%  The stimulator lead tip is located at superior aspect of T7  We plan to see you back at the clinic on 1/23/2018 and 1/26/2018.     For the duration of the trial, patients should not:   - Raise their arms over their heads of move their shoulders beyond 90 degrees in any direction   - Bend, twist, stretch, or lift more than 5 lbs   - Drive or operate machinery when the stimulator is on, because an unexpected electrical surge can cause distracting sensations and unwanted muscle contractions or spasms.   - Do not Shower or bathe- do not submerge the area or let water directly contact the area where the leads exit. Sponge bathe only.     For the duration of the trial, patients should:   - Position your bed to maintain body alignment (keep bed as flat, straight as possible)   - Sleep on a firm mattress that supports your legs and back equally    - Obtain physician approval before undergoing any spinal or chiropractic manipulation   - Move shoulders and hips at the same time, in a \"log rolling\" movement that minimizes twisting of the spine.   - Realize that because the trial lead will never \"scar into place\" the lead will be more mobile within the spine and is therefore subject to changes " with abrupt movements. Small shifts can lead to dramatic changes in coverage, strength of stimulation (too weak or too strong), and would require the representative to adjust your settings in person.   - Realize that you do have the option of turning it off if there is a dramatic, uncomfortable stimulation occuring on account of a shifted lead.     The following instructions can be helpful if you are experiencing a change while you are at home:   - If stimulation increases when you bend your neck back (look up) or lean back or when you sit or lay down, try decreasing the simulation by lowering the amplitude   - If stimulation decreases when you stand up, try increasing the amplitude   - Stimulation may stop when you bend your neck forward or lean forward. Stimulation will resume when you assume a different position. If this occurs too quickly when you move, try increasing the amplitude slightly   - To smooth out the sensation, adjust the rate of stimulation until you feel comfortable.   - Turn the neurostimulator off or the amplitude down when changing positions or adjustments   - If the stimulation is uncomfortable at any time, turn the neurostimulator off.     During the trial, we are interested to note the following:   - Are there any areas of your pain that are not covered, or lose coverage as the trial proceeds?   - Are there areas that are getting stimulated that do not need to be, or are annoying or bothersome?   - Are there certain body positions that cause the coverage to be lost, or cause stimulation to be too intense   - Can you walk 50% more distance than before   - Can you take 50% less medication and still get relief   - Do you have a reduction in side effects because you are taking less medications.   - What are your pre and post pain levels      Nurse line: [phone]   Appt line: [phone]

## 2018-01-22 NOTE — NURSING NOTE
"Chief Complaint   Patient presents with     Pain       Initial /69  Pulse 67  Temp 98.1  F (36.7  C) (Oral) Estimated body mass index is 25.37 kg/(m^2) as calculated from the following:    Height as of 10/12/17: 1.702 m (5' 7\").    Weight as of 12/5/17: 73.5 kg (162 lb).  Medication Reconciliation: complete     Pre-procedure Intake    Have you been fasting? Yes    If yes, for how long? Over 11 hours    Are you taking a prescribed blood thinner such as coumadin, Plavix, Xarelto?    No    If yes, when did you take your last dose? NA    Do you take aspirin?  No    If cervical procedure, have you held aspirin for 6 days?   NA    Do you have any allergies to contrast dye, iodine, steroid and/or numbing medications?  NO    Are you currently taking antibiotics or have an active infection?  NO    Have you had a fever/elevated temperature within the past week? NO    Are you currently taking oral steroids? NO    Do you have a ? Yes       Are you pregnant or breastfeeding?  NO    Are the vital signs normal?  Yes    Nery Kinsey CMA ([facility])          "

## 2018-01-23 ENCOUNTER — ALLIED HEALTH/NURSE VISIT (OUTPATIENT)
Dept: PALLIATIVE MEDICINE | Facility: CLINIC | Age: 63
End: 2018-01-23
Payer: MEDICARE

## 2018-01-23 VITALS — TEMPERATURE: 98.6 F

## 2018-01-23 DIAGNOSIS — G89.4 CHRONIC PAIN SYNDROME: Primary | ICD-10-CM

## 2018-01-23 PROCEDURE — 99207 ZZC NO CHARGE NURSE ONLY: CPT

## 2018-01-23 NOTE — MR AVS SNAPSHOT
"              After Visit Summary   1/23/2018    Yusra Rivas    MRN: [id number]           Patient Information     Date Of Birth          1955        Visit Information        Provider Department      1/23/2018 2:00 PM Nurse, [facility]        Today's Diagnoses     Chronic pain syndrome    -  1       Follow-ups after your visit        Your next 10 appointments already scheduled     Jan 25, 2018  2:30 PM CST   Nurse Only with [facility] Nurse   [facility] ([facility])    [id number] [facility]  [place] [address] [phone]            Feb 27, 2018  1:30 PM CST   [facility]    [address] [facility] [address] [phone]              Who to contact     If you have questions or need follow up information about today's clinic visit or your schedule please contact [facility] directly at [phone].  Normal or non-critical lab and imaging results will be communicated to you by GeeYeehart, letter or phone within 4 business days after the clinic has received the results. If you do not hear from us within 7 days, please contact the clinic through Networkt or phone. If you have a critical or abnormal lab result, we will notify you by phone as soon as possible.  Submit refill requests through OurCrowd or call your pharmacy and they will forward the refill request to us. Please allow 3 business days for your refill to be completed.          Additional Information About Your Visit        GeeYeeharOmniata Information     OurCrowd lets you send messages to your doctor, view your test results, renew your prescriptions, schedule appointments and more. To sign up, go to [email or web address] . Click on \"Log in\" on the left side of the screen, which will take you to the Welcome page. Then click on \"Sign up Now\" on the right side of the page.     You will be asked " to enter the access code listed below, as well as some personal information. Please follow the directions to create your username and password.     Your access code is: [email or web address]  Expires: 3/5/2018  3:13 PM     Your access code will  in 90 days. If you need help or a new code, please call your [place] clinic or [phone].        Care EveryWhere ID     This is your Care EveryWhere ID. This could be used by other organizations to access your [place] medical records  [id number]        Your Vitals Were     Temperature                   98.6  F (37  C) (Oral)            Blood Pressure from Last 3 Encounters:   18 105/61   17 136/80   10/12/17 139/74    Weight from Last 3 Encounters:   17 73.5 kg (162 lb)   10/12/17 70.3 kg (155 lb)   10/03/17 72.1 kg (159 lb)              Today, you had the following     No orders found for display       Primary Care Provider Office Phone # Fax #    Bxgjrgbx Kamaljit Lynn,  [phone] [address] [place] [address]        Equal Access to Services     [facility] AH: Hadii aad ku hadasho Soomaali, waaxda luqadaha, qaybta kaalmada adeegyada, trisha wilson. So [facility] [phone].    ATENCIÓN: Si habla español, tiene a ruiz disposición servicios gratuitos de asistencia lingüística. [facility] [phone].    We comply with applicable federal civil rights laws and [place] laws. We do not discriminate on the basis of race, color, national origin, age, disability, sex, sexual orientation, or gender identity.            Thank you!     Thank you for choosing [facility]  for your care. Our goal is always to provide you with excellent care. Hearing back from our patients is one way we can continue to improve our services. Please take a few minutes to complete the written survey that you may receive in the mail after your visit with us. Thank you!             Your Updated Medication List - Protect  others around you: Learn how to safely use, store and throw away your medicines at www.disposemymeds.org.          This list is accurate as of: 1/23/18  3:26 PM.  Always use your most recent med list.                   Brand Name Dispense Instructions for use Diagnosis    cholecalciferol 1000 UNIT tablet    vitamin D3    200 tablet    TAKE TWO TABLETS BY MOUTH EVERY DAY    Routine general medical examination at a health care facility        MG capsule   Generic drug:  docusate sodium     100 capsule    TAKE ONE CAPSULE BY MOUTH EVERY DAY    Slow transit constipation       gabapentin 300 MG capsule    NEURONTIN    270 capsule    Increase the Gabapentin to 900 mg three time a day.    Encounter for other specified aftercare       HYDROcodone-acetaminophen 5-325 MG per tablet    NORCO    75 tablet    Take 1 tablet by mouth every 6 hours as needed for pain Max of 3 tablets per day on bad days. This is a 30 day supply.  Dispense on/after 1/5/18.  To start on 1/7/18    Spasm, Neck pain       metoprolol tartrate 50 MG tablet    LOPRESSOR    180 tablet    TAKE ONE TABLET BY MOUTH TWICE A DAY    Hypertension goal BP (blood pressure) < 130/80       * multivitamin Tabs per tablet     60 tablet    TAKE ONE TABLET BY MOUTH EVERY DAY    Routine history and physical examination of adult       * multivitamin Tabs per tablet     90 tablet    Take 1 tablet by mouth daily    Vitamin deficiency       VITAMIN B-12 PO      Take by mouth daily        VITAMIN B6 PO      Take by mouth daily        * Notice:  This list has 2 medication(s) that are the same as other medications prescribed for you. Read the directions carefully, and ask your doctor or other care provider to review them with you.

## 2018-01-23 NOTE — NURSING NOTE
Pt came in today for mid trial adjustment and met with [employer], nurse and provider.     At the lead insertion site there was an area of fresh discharge that patient stated was there yesterday. Discharge did not extend past the clear Tegaderm.     Patient stated she already felt like she could stand straighter.     Lead pull removal date moved up due to good results.     Abril Culp RN, [facility]  Pain Clinic Care Coordinator

## 2018-01-25 ENCOUNTER — ALLIED HEALTH/NURSE VISIT (OUTPATIENT)
Dept: PALLIATIVE MEDICINE | Facility: CLINIC | Age: 63
End: 2018-01-25
Payer: MEDICARE

## 2018-01-25 VITALS — TEMPERATURE: 98.4 F

## 2018-01-25 DIAGNOSIS — Z51.89 ENCOUNTER FOR OTHER SPECIFIED AFTERCARE: ICD-10-CM

## 2018-01-25 DIAGNOSIS — G89.4 CHRONIC PAIN DISORDER: Primary | ICD-10-CM

## 2018-01-25 DIAGNOSIS — R25.2 SPASM: ICD-10-CM

## 2018-01-25 DIAGNOSIS — M54.2 NECK PAIN: ICD-10-CM

## 2018-01-25 NOTE — NURSING NOTE
Spinal cord stimulator leads were pulled w/out complication. Catheter intact.  Pressure applied. Pt tolerated this well.    Incision site:  Clean, dry, and intact    Incision cleaned with:  Chlorhexidene/alcohol (choose one)    Dressin         band aids applied    Was a weight and temperature done:  Yes, just a temp    Discharge criteria:  AVS information reviewed and AVS was given to pt.  If the trial was successful pt was informed that insurance will be checked.  See the AVS for more information.    Spinal cord stimulator trial patient response:    Were there any areas of your pain that were not covered, or lost coverage as the trial proceeded?   yes     Were there areas that were getting stimulated that did not need to be, or were annoying or bothersome?   No,     Were there certain body positions that caused the coverage to be lost or caused stimulation to be too intense?   Yes, every now and then but she was able to shift and get the coverage back right away    Could you walk 50% more distance than before?   Yes, and was able to walk in an upright position where as before she had to bend over and to the right.  She was able to staighten her R leg fully during the trial where as her leg is normally always bent.     Were there activities you were able to participate in that you hadn't previously been able to do?   N/A, not sure    Overall, what percentage of pain reduction did you receive during the trial?   60%-80% if not more    Were you able to decrease any of your pain medications during the trial?  No, due to incisional pain on the R side.  She thinks she will accomplish this once the implant is in    Did you have a reduction in medication side effects because you have been taking less?   N/A,     Was the spinal cord stimulator trial successful enough to proceed to the implant?   Yes,      If successful was it submitted to insurance for implant coverage?  yes    Angélica Benites, RN-BSN  [facility]  [facility]

## 2018-01-25 NOTE — PATIENT INSTRUCTIONS
DISCHARGE INSTRUCTIONS for post-spinal cord stimulator trial lead pull  Nurse line: [phone]  Appt line:  [phone]   After hours line:  [phone]    1.  You may shower 24 hours after your trial leads are pulled. You may soak/bathe 72 hours after your trial leads are pulled.  2. You will need to refrain from having any dental work or a colonoscopy for 2 weeks after this procedure    SYMPTOMS TO REPORT  1. Signs of infection such as chills, fever, increased pain, redness, drainage or swelling from the incision site.  2. Headache persisting beyond 48 hours.  3. Unusual changes in sensation or loss of sensation.  4. It is considered a medical emergency if you experience:    Sudden severe back pain    Sudden onset leg weakness or severe spasms    Loss of bladder control and/or bowel function  GO TO THE HOSPITAL OR CALL DOCTOR ASAP!    1. If the trial was a success we will submit your information to your insurance for approval.  This may take up to 30 days.    At this time Dr. Sharri Narvaez does implants at the [facility] location.

## 2018-01-25 NOTE — TELEPHONE ENCOUNTER
Pt needs a refill of norco and gabapentin.  Mail to [facility].    Routed to the nursing pool and to the MA pool to process refill(s).    Angélica Benites, RN-BSN  [facility]

## 2018-01-25 NOTE — MR AVS SNAPSHOT
After Visit Summary   1/25/2018    Yusra Rivas    MRN: [id number]           Patient Information     Date Of Birth          1955        Visit Information        Provider Department      1/25/2018 2:30 PM [facility] [place] [facility]        Care Instructions    DISCHARGE INSTRUCTIONS for post-spinal cord stimulator trial lead pull  Nurse line: [phone]  Appt line:  [phone]   After hours line:  [phone]    1.  You may shower 24 hours after your trial leads are pulled. You may soak/bathe 72 hours after your trial leads are pulled.  2. You will need to refrain from having any dental work or a colonoscopy for 2 weeks after this procedure    SYMPTOMS TO REPORT  1. Signs of infection such as chills, fever, increased pain, redness, drainage or swelling from the incision site.  2. Headache persisting beyond 48 hours.  3. Unusual changes in sensation or loss of sensation.  4. It is considered a medical emergency if you experience:    Sudden severe back pain    Sudden onset leg weakness or severe spasms    Loss of bladder control and/or bowel function  GO TO THE HOSPITAL OR CALL DOCTOR ASAP!    1. If the trial was a success we will submit your information to your insurance for approval.  This may take up to 30 days.    At this time Dr. Sharri Narvaez does implants at the [facility] location.          Follow-ups after your visit        Your next 10 appointments already scheduled     Feb 27, 2018  1:30 PM CST   Return Visit with Sandra Arroyo MD   [facility]    [address] [place] [address] [phone]              Who to contact     If you have questions or need follow up information about today's clinic visit or your schedule please contact [facility] directly at [phone].  Normal or non-critical lab and imaging results will be communicated to you by MyChart, letter or phone within 4 business days  "after the clinic has received the results. If you do not hear from us within 7 days, please contact the clinic through [employer] or phone. If you have a critical or abnormal lab result, we will notify you by phone as soon as possible.  Submit refill requests through [employer] or call your pharmacy and they will forward the refill request to us. Please allow 3 business days for your refill to be completed.          Additional Information About Your Visit        [employer] Information     [employer] lets you send messages to your doctor, view your test results, renew your prescriptions, schedule appointments and more. To sign up, go to [email or web address] . Click on \"Log in\" on the left side of the screen, which will take you to the Welcome page. Then click on \"Sign up Now\" on the right side of the page.     You will be asked to enter the access code listed below, as well as some personal information. Please follow the directions to create your username and password.     Your access code is: [email or web address]  Expires: 3/5/2018  3:13 PM     Your access code will  in 90 days. If you need help or a new code, please call your [place] clinic or [phone].        Care EveryWhere ID     This is your Care EveryWhere ID. This could be used by other organizations to access your [place] medical records  [id number]        Your Vitals Were     Temperature                   98.4  F (36.9  C) (Oral)            Blood Pressure from Last 3 Encounters:   18 105/61   17 136/80   10/12/17 139/74    Weight from Last 3 Encounters:   17 73.5 kg (162 lb)   10/12/17 70.3 kg (155 lb)   10/03/17 72.1 kg (159 lb)              Today, you had the following     No orders found for display       Primary Care Provider Office Phone # Fax #    Munir Kamaljit Lynn [phone] [address] [place] [address]        Equal Access to Services     ROSEMARY EDUARDO AH: honey Romano " mahi cecyjonah cohentrisha parker ah. So [facility] [phone].    ATENCIÓN: Si cecy boyle, tiene a ruiz disposición servicios gratuitos de asistencia lingüística. Jean al [phone].    We comply with applicable federal civil rights laws and [place] laws. We do not discriminate on the basis of race, color, national origin, age, disability, sex, sexual orientation, or gender identity.            Thank you!     Thank you for choosing [facility]  for your care. Our goal is always to provide you with excellent care. Hearing back from our patients is one way we can continue to improve our services. Please take a few minutes to complete the written survey that you may receive in the mail after your visit with us. Thank you!             Your Updated Medication List - Protect others around you: Learn how to safely use, store and throw away your medicines at www.disposemymeds.org.          This list is accurate as of 1/25/18  4:21 PM.  Always use your most recent med list.                   Brand Name Dispense Instructions for use Diagnosis    cholecalciferol 1000 UNIT tablet    vitamin D3    200 tablet    TAKE TWO TABLETS BY MOUTH EVERY DAY    Routine general medical examination at a health care facility        MG capsule   Generic drug:  docusate sodium     100 capsule    TAKE ONE CAPSULE BY MOUTH EVERY DAY    Slow transit constipation       gabapentin 300 MG capsule    NEURONTIN    270 capsule    Increase the Gabapentin to 900 mg three time a day.    Encounter for other specified aftercare       HYDROcodone-acetaminophen 5-325 MG per tablet    NORCO    75 tablet    Take 1 tablet by mouth every 6 hours as needed for pain Max of 3 tablets per day on bad days. This is a 30 day supply.  Dispense on/after 1/5/18.  To start on 1/7/18    Spasm, Neck pain       metoprolol tartrate 50 MG tablet    LOPRESSOR    180 tablet    TAKE ONE TABLET BY MOUTH TWICE A DAY     Hypertension goal BP (blood pressure) < 130/80       * multivitamin Tabs per tablet     60 tablet    TAKE ONE TABLET BY MOUTH EVERY DAY    Routine history and physical examination of adult       * multivitamin Tabs per tablet     90 tablet    Take 1 tablet by mouth daily    Vitamin deficiency       VITAMIN B-12 PO      Take by mouth daily        VITAMIN B6 PO      Take by mouth daily        * Notice:  This list has 2 medication(s) that are the same as other medications prescribed for you. Read the directions carefully, and ask your doctor or other care provider to review them with you.

## 2018-01-26 NOTE — TELEPHONE ENCOUNTER
Medication refill information reviewed.     Due date for Norco is 2/6/2018      Prescriptions prepped for review.     Will route to provider.

## 2018-01-26 NOTE — TELEPHONE ENCOUNTER
Received call from patient requesting refill(s) of HYDROcodone-acetaminophen (NORCO) 5-325 MG per tablet             gabapentin (NEURONTIN) 300 MG capsule    Last picked up from pharmacy on: [place]  01/05/18            Gabapentin 12/15/17    Pt last seen by prescribing provider on 10/03/17 office visit  01/22/18  procedure  Next appt scheduled for : none    Last urine drug screen date 11/30/16  Current opioid agreement on file (completed within the last year) No Date of opioid agreement: 12/21/16    Processing (pick one and delete the others):      Mail to [facility]   [address] [facility] [address]    Will route to nursing [place] for review and preparation of prescription(s).

## 2018-01-29 RX ORDER — GABAPENTIN 300 MG/1
900 CAPSULE ORAL 3 TIMES DAILY
Qty: 270 CAPSULE | Refills: 3 | Status: SHIPPED | OUTPATIENT
Start: 2018-01-29 | End: 2018-06-19

## 2018-01-29 RX ORDER — HYDROCODONE BITARTRATE AND ACETAMINOPHEN 5; 325 MG/1; MG/1
1 TABLET ORAL EVERY 6 HOURS PRN
Qty: 75 TABLET | Refills: 0 | Status: SHIPPED | OUTPATIENT
Start: 2018-01-29 | End: 2018-02-27

## 2018-01-29 NOTE — TELEPHONE ENCOUNTER
Signed Prescriptions:                        Disp   Refills    gabapentin (NEURONTIN) 300 MG capsule      270 ca*3        Sig: Take 3 capsules (900 mg) by mouth 3 times daily  Authorizing Provider: ANTOINE SU    HYDROcodone-acetaminophen (NORCO) 5-325 MG*75 tab*0        Sig: Take 1 tablet by mouth every 6 hours as needed for           pain Max of 3 tablets per day on bad days. This           is a 30 day supply.  Dispense on/after 2/5/18.            To start on 2/6/18  Authorizing Provider: ANTOINE SU    Reviewed, Gabapentin e-prescribed, [place] printed and signed in [place].  Placed in MA basket for processing.    Antoine Narvaez MD  [facility]

## 2018-02-01 ENCOUNTER — TELEPHONE (OUTPATIENT)
Dept: PALLIATIVE MEDICINE | Facility: CLINIC | Age: 63
End: 2018-02-01

## 2018-02-01 NOTE — TELEPHONE ENCOUNTER
VM left today at: 12:23 pm    Patient calling with side effects of her SCS. Wants to provide details when she gets a call back.    Ph. [phone]    [place] NICE.    [facility] [place]

## 2018-02-01 NOTE — TELEPHONE ENCOUNTER
Agree, sounds like increased symptoms due to lack of stimulation.  The bruising was likely due to a skin or subcutaneous blood vessel that was nicked by the epidural needle and will absorb without problems.  She should continue to monitor her symptoms and let us know if anything changes.    Antoine Narvaez MD  [facility]

## 2018-02-01 NOTE — TELEPHONE ENCOUNTER
Her right hip and upper buttocks was bruised and sore to the touch prior to the pull. Did the pull with increased pain due to not having the stim any more. The bruised area started to progress and by Saturday she ached bones, muscles and pins and needles. She stated she felt like she had multiple pinched nerves, as the bruising subsided the legs are getting a little better but the left side is still hurts a little. She stated things are getting a little better but she just wanted to let us know.     Stated will discuss with Dr. Sharri Narvaez but sounds like she is simply recovering from the SCS trial.     Abril Culp RN, [facility]  Pain Clinic Care Coordinator

## 2018-02-02 NOTE — TELEPHONE ENCOUNTER
Called pt and relayed message and she stated OK and she agreed     Abril Culp RN, [facility]   Pain Clinic Care Coordinator

## 2018-02-07 ENCOUNTER — TELEPHONE (OUTPATIENT)
Dept: PALLIATIVE MEDICINE | Facility: CLINIC | Age: 63
End: 2018-02-07

## 2018-02-07 DIAGNOSIS — M47.815 SPONDYLOSIS OF THORACOLUMBAR REGION WITHOUT MYELOPATHY OR RADICULOPATHY: Primary | ICD-10-CM

## 2018-02-07 NOTE — TELEPHONE ENCOUNTER
"Pt stated that her left leg was always her good leg and from the knee down, the calf, ankle and in the foot she now feels aching pain, the skin feels like it is burning and stinging and the ankle \"feels different\". She stated she feels like her leg will not support her. The ankle/foot will not bend easily. She stated this a new complication and this is not going away.     Stated will send to provider and ask if he wants to see her.     Abril Culp RN, [facility]  Pain Clinic Care Coordinator       "

## 2018-02-07 NOTE — TELEPHONE ENCOUNTER
It is unclear why she is having the current symptoms, but since it is new since the procedure I will order a thoracic spine MRI to rule out nerve impingement.  She can take one or two extra Gabapentin per day to help with the nerve symptoms.  Have her in for a follow up after the MRI.    Orders Placed This Encounter   Procedures     MR Thoracic Spine w/o Contrast       Antoine Narvaez MD  [facility]

## 2018-02-07 NOTE — TELEPHONE ENCOUNTER
Pt LM at 0921 with questions about side effects post-SCS trial. She is wanting to know how long they will take to clear up. She states she is having trouble with her left foot and ankle. It seems like it doesn't want to support her/work right. Her leg gets icy cold/burning/stinging sensations and her ankle always feel like that. Patient would like call back. Phone # [phone]     Ainsley Sky    [facility]

## 2018-02-08 NOTE — TELEPHONE ENCOUNTER
Called and talked to patient. She stated that she woke this morning and her symptoms are gone. She stated that she was doing much better and able to walk with out problems. Stated that there was an order for an MRI and she could increase her Gabapentin as needed. She stated that she was going to hold off doing the MRI but if the pain comes back then she would go get it done.     Will keep encounter open just in case pt calls back.     Abril Culp RN, [facility]  Pain Clinic Care Coordinator

## 2018-02-12 NOTE — TELEPHONE ENCOUNTER
Talked to pt and she stated that things are slowly getting better and so is not going to get the MRI. Keeps improving daily. Closing encounter.     Abril Culp RN, [facility]  Pain Clinic Care Coordinator

## 2018-02-26 DIAGNOSIS — R25.2 SPASM: ICD-10-CM

## 2018-02-26 DIAGNOSIS — M54.2 NECK PAIN: ICD-10-CM

## 2018-02-26 NOTE — TELEPHONE ENCOUNTER
VM left today at: 12:44 pm       Reason for call:  Medication   If this is a refill request, has the caller requested the refill from the pharmacy already? N/A  Will the patient be using a [facility]? Yes  Name of the pharmacy and phone number for the current request: [facility] [place] - [address]       Name of the medication requested: HYDROcodone-acetaminophen (NORCO) 5-325 MG per tablet      Other request: Pt would like rx sent to [facility] pharmacy          Phone number to reach patient:  Home number on file [phone] ([place]      [place] ABELINO    [facility]

## 2018-02-26 NOTE — TELEPHONE ENCOUNTER
Received call from patient requesting refill(s) of HYDROcodone-acetaminophen (NORCO) 5-325 MG per tablet    Last picked up from pharmacy on 02/05/18    Pt last seen by prescribing provider on 01/15/18 (has had SCS trial since)  Next appt scheduled for None    Last urine drug screen date 11/30/16  Current opioid agreement on file (completed within the last year) No Date of opioid agreement: 12/21/16    Processing (pick one and delete the others):      Mail to [facility]   [address] [facility] [address]    Will route to nursing pool for review and preparation of prescription(s).

## 2018-02-27 RX ORDER — HYDROCODONE BITARTRATE AND ACETAMINOPHEN 5; 325 MG/1; MG/1
1 TABLET ORAL EVERY 6 HOURS PRN
Qty: 75 TABLET | Refills: 0 | Status: SHIPPED | OUTPATIENT
Start: 2018-02-27 | End: 2018-03-23

## 2018-02-27 NOTE — TELEPHONE ENCOUNTER
Medication refill information reviewed.     Due date for Norco is 3/8/2018     Prescriptions prepped for review.     Will route to provider.     Abril Culp RN, [facility]  Pain Clinic Care Coordinator

## 2018-02-27 NOTE — TELEPHONE ENCOUNTER
Signed Prescriptions:                        Disp   Refills    HYDROcodone-acetaminophen (NORCO) 5-325 MG*75 tab*0        Sig: Take 1 tablet by mouth every 6 hours as needed for           pain Max of 3 tablets per day on bad days. This           is a 30 day supply.  Dispense on/after 3/6/18.            To start on 3/8/18  Authorizing Provider: ANTOINE SU    Reviewed, printed, signed in [place].  Placed in MA basket for processing.    Antoine Narvaez MD  [facility]

## 2018-03-19 DIAGNOSIS — I10 HYPERTENSION GOAL BP (BLOOD PRESSURE) < 130/80: ICD-10-CM

## 2018-03-19 NOTE — TELEPHONE ENCOUNTER
"Requested Prescriptions   Pending Prescriptions Disp Refills     metoprolol tartrate (LOPRESSOR) 50 MG tablet [Pharmacy Med Name: METOPROLOL TARTRATE 50MG TABS] 180 tablet 2     Sig: TAKE ONE TABLET BY MOUTH TWICE A DAY    Beta-Blockers Protocol Passed    3/19/2018  5:38 PM       Passed - Blood pressure under 140/90 in past 12 months    BP Readings from Last 3 Encounters:   01/22/18 105/61   12/05/17 136/80   10/12/17 139/74                Passed - Patient is age 6 or older       Passed - Recent (12 mo) or future (30 days) visit within the authorizing provider's specialty    Patient had office visit in the last 12 months or has a visit in the next 30 days with authorizing provider or within the authorizing provider's specialty.  See \"Patient Info\" tab in inbasket, or \"Choose Columns\" in Meds & Orders section of the refill encounter.            Last Written Prescription Date:  6/21/17  Last Fill Quantity: 180,  # refills: 2   Last office visit: 12/5/2017 with prescribing provider:     Future Office Visit:   Next 5 appointments (look out 90 days)     Mar 20, 2018  2:30 PM CDT   [facility] MD   [facility]    [address] [facility] [address] [phone]                   "

## 2018-03-20 ENCOUNTER — ONCOLOGY VISIT (OUTPATIENT)
Dept: ONCOLOGY | Facility: CLINIC | Age: 63
End: 2018-03-20
Payer: MEDICARE

## 2018-03-20 VITALS
TEMPERATURE: 97.1 F | WEIGHT: 164.3 LBS | DIASTOLIC BLOOD PRESSURE: 67 MMHG | SYSTOLIC BLOOD PRESSURE: 126 MMHG | RESPIRATION RATE: 16 BRPM | HEIGHT: 67 IN | BODY MASS INDEX: 25.79 KG/M2 | HEART RATE: 75 BPM | OXYGEN SATURATION: 98 %

## 2018-03-20 DIAGNOSIS — C34.90 MALIGNANT NEOPLASM OF LUNG, UNSPECIFIED LATERALITY, UNSPECIFIED PART OF LUNG (H): Primary | ICD-10-CM

## 2018-03-20 DIAGNOSIS — Z17.1 MALIGNANT NEOPLASM OF OVERLAPPING SITES OF RIGHT BREAST IN FEMALE, ESTROGEN RECEPTOR NEGATIVE (H): ICD-10-CM

## 2018-03-20 DIAGNOSIS — G62.0 POLYNEUROPATHY DUE TO DRUGS (H): ICD-10-CM

## 2018-03-20 DIAGNOSIS — C50.811 MALIGNANT NEOPLASM OF OVERLAPPING SITES OF RIGHT BREAST IN FEMALE, ESTROGEN RECEPTOR NEGATIVE (H): ICD-10-CM

## 2018-03-20 DIAGNOSIS — I10 HYPERTENSION GOAL BP (BLOOD PRESSURE) < 130/80: ICD-10-CM

## 2018-03-20 LAB
ALBUMIN SERPL-MCNC: 3.8 G/DL (ref 3.4–5)
ALP SERPL-CCNC: 75 U/L (ref 40–150)
ALT SERPL W P-5'-P-CCNC: 21 U/L (ref 0–50)
ANION GAP SERPL CALCULATED.3IONS-SCNC: 7 MMOL/L (ref 3–14)
AST SERPL W P-5'-P-CCNC: 20 U/L (ref 0–45)
BASOPHILS # BLD AUTO: 0 10E9/L (ref 0–0.2)
BASOPHILS NFR BLD AUTO: 0.5 %
BILIRUB SERPL-MCNC: 0.3 MG/DL (ref 0.2–1.3)
BUN SERPL-MCNC: 21 MG/DL (ref 7–30)
CALCIUM SERPL-MCNC: 9.7 MG/DL (ref 8.5–10.1)
CANCER AG27-29 SERPL-ACNC: 24 U/ML (ref 0–39)
CHLORIDE SERPL-SCNC: 107 MMOL/L (ref 94–109)
CO2 SERPL-SCNC: 27 MMOL/L (ref 20–32)
CREAT SERPL-MCNC: 0.76 MG/DL (ref 0.52–1.04)
DIFFERENTIAL METHOD BLD: NORMAL
EOSINOPHIL # BLD AUTO: 0.3 10E9/L (ref 0–0.7)
EOSINOPHIL NFR BLD AUTO: 3.5 %
ERYTHROCYTE [DISTWIDTH] IN BLOOD BY AUTOMATED COUNT: 13.5 % (ref 10–15)
GFR SERPL CREATININE-BSD FRML MDRD: 77 ML/MIN/1.7M2
GLUCOSE SERPL-MCNC: 90 MG/DL (ref 70–99)
HCT VFR BLD AUTO: 44.3 % (ref 35–47)
HGB BLD-MCNC: 14.1 G/DL (ref 11.7–15.7)
IMM GRANULOCYTES # BLD: 0 10E9/L (ref 0–0.4)
IMM GRANULOCYTES NFR BLD: 0.3 %
LYMPHOCYTES # BLD AUTO: 2.8 10E9/L (ref 0.8–5.3)
LYMPHOCYTES NFR BLD AUTO: 37 %
MCH RBC QN AUTO: 29.9 PG (ref 26.5–33)
MCHC RBC AUTO-ENTMCNC: 31.8 G/DL (ref 31.5–36.5)
MCV RBC AUTO: 94 FL (ref 78–100)
MONOCYTES # BLD AUTO: 0.5 10E9/L (ref 0–1.3)
MONOCYTES NFR BLD AUTO: 6.5 %
NEUTROPHILS # BLD AUTO: 3.9 10E9/L (ref 1.6–8.3)
NEUTROPHILS NFR BLD AUTO: 52.2 %
PLATELET # BLD AUTO: 276 10E9/L (ref 150–450)
POTASSIUM SERPL-SCNC: 3.5 MMOL/L (ref 3.4–5.3)
PROT SERPL-MCNC: 8 G/DL (ref 6.8–8.8)
RBC # BLD AUTO: 4.71 10E12/L (ref 3.8–5.2)
SODIUM SERPL-SCNC: 141 MMOL/L (ref 133–144)
WBC # BLD AUTO: 7.5 10E9/L (ref 4–11)

## 2018-03-20 PROCEDURE — 36415 COLL VENOUS BLD VENIPUNCTURE: CPT | Performed by: INTERNAL MEDICINE

## 2018-03-20 PROCEDURE — 85025 COMPLETE CBC W/AUTO DIFF WBC: CPT | Performed by: INTERNAL MEDICINE

## 2018-03-20 PROCEDURE — 80053 COMPREHEN METABOLIC PANEL: CPT | Performed by: INTERNAL MEDICINE

## 2018-03-20 PROCEDURE — 99214 OFFICE O/P EST MOD 30 MIN: CPT | Performed by: INTERNAL MEDICINE

## 2018-03-20 PROCEDURE — 86300 IMMUNOASSAY TUMOR CA 15-3: CPT | Performed by: INTERNAL MEDICINE

## 2018-03-20 ASSESSMENT — PAIN SCALES - GENERAL: PAINLEVEL: MODERATE PAIN (5)

## 2018-03-20 NOTE — NURSING NOTE
DISCHARGE PLAN:  Next appointments: See patient instruction section  Departure Mode: Ambulatory  Accompanied by: self  8 minutes for nursing discharge (face to face time)     Yusraveronica Rivas is here today for Oncology follow up.  Writing nurse seen patient after Medical Oncology appointment to address questions/concerns/coordinate care. Patient to have CT now and follow up in 1 year with labs. Appointments scheduled. See patient instructions and Oncologist's Progress note for further details. Questions and concerns addressed to patient's satisfaction. Patient verbalized and demonstrated understanding of plan.  Contact information provided and patient is encouraged to call with any that arise in the interim of care.    Jeffry Smith RN, BSN, OCN   Oncology Care Coordinator RN  [facility]  [phone]  3/20/2018, 2:54 PM

## 2018-03-20 NOTE — NURSING NOTE
"Oncology Rooming Note    March 20, 2018 2:24 PM   Yusra Rivas is a 62 year old female who presents for:    Chief Complaint   Patient presents with     Oncology Clinic Visit     6 month follow up for Malignant neoplasm of lung, unspecified laterality, unspecified part of lung and Malignant neoplasm of overlapping sites of right breast in female, estrogen receptor negative     Results     Labs today     Initial Vitals: /67 (BP Location: Left arm, Patient Position: Chair, Cuff Size: Adult Regular)  Pulse 75  Temp 97.1  F (36.2  C) (Temporal)  Resp 16  Ht 1.702 m (5' 7\")  Wt 74.5 kg (164 lb 4.8 oz)  SpO2 98%  BMI 25.73 kg/m2 Estimated body mass index is 25.73 kg/(m^2) as calculated from the following:    Height as of this encounter: 1.702 m (5' 7\").    Weight as of this encounter: 74.5 kg (164 lb 4.8 oz). Body surface area is 1.88 meters squared.  Moderate Pain (5) Comment: All over    No LMP recorded. Patient is postmenopausal.  Allergies reviewed: Yes  Medications reviewed: Yes    Medications: Medication refills not needed today.  Pharmacy name entered into [facility]:    [facility] [place] [facility] [address] [facility] [place] - [address] [facility]    Clinical concerns: None Dr. Arroyo was notified.    Vanessa Almeida MA              "

## 2018-03-20 NOTE — MR AVS SNAPSHOT
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 Arroyo in 1 year.  Please come 15-30 minutes early for labs.    CT Now!    CT Scan Date/Time:  3/21/18 at 3:00 - Check in at 2:45    Follow Up Date/Time:     If you have any questions or concerns please feel free to call.    If you need to reschedule please call:  Clinic or Lab Appointment - [phone]  Infusion - [phone]  Imaging - [phone]    Jeffry Smith RN, BSN, OCN   Oncology Care Coordinator RN  [facility]  [phone]              Follow-ups after your visit        Follow-up notes from your care team     Return in about 1 year (around 3/20/2019) for Imaging ordered before next appointment, Imaging ordered today, Blood work before next appointment.      Your next 10 appointments already scheduled     Mar 21, 2018  3:00 PM CDT   (Arrive by 2:45 PM)   CT CHEST W/O CONTRAST with PHCT1   [facility] CT Scan ([facility])    [address] [place] [id number] [phone]           Please bring any scans or X-rays taken at other hospitals, if similar tests were done. Also bring a list of your medicines, including vitamins, minerals and over-the-counter drugs. It is safest to leave personal items at home.  Be sure to tell your doctor:   If you have any allergies.   If there s any chance you are pregnant.   If you are  "breastfeeding.  You do not need to do anything special to prepare for this exam.  Please wear loose clothing, such as a sweat suit or jogging clothes. Avoid snaps, zippers and other metal. We may ask you to undress and put on a hospital gown.            Apr 26, 2018  2:00 PM CDT   (Arrive by 1:45 PM)   New Patient Visit with Patrick Quevedo MD   [facility] ([facility])    [address] [place] [address]  [address] [facility] [address] [phone]              Future tests that were ordered for you today     Open Future Orders        Priority Expected Expires Ordered    CBC with platelets differential Routine 3/20/2019 3/20/2019 3/20/2018    Comprehensive metabolic panel Routine 3/20/2019 3/20/2019 3/20/2018    CT Chest w/o Contrast Routine  3/21/2019 3/20/2018            Who to contact     If you have questions or need follow up information about today's clinic visit or your schedule please contact [facility] directly at [phone].  Normal or non-critical lab and imaging results will be communicated to you by [employer], letter or phone within 4 business days after the clinic has received the results. If you do not hear from us within 7 days, please contact the clinic through [employer] or phone. If you have a critical or abnormal lab result, we will notify you by phone as soon as possible.  Submit refill requests through [employer] or call your pharmacy and they will forward the refill request to us. Please allow 3 business days for your refill to be completed.          Additional Information About Your Visit        [employer] Information     [employer] lets you send messages to your doctor, view your test results, renew your prescriptions, schedule appointments and more. To sign up, go to [email or web address] . Click on \"Log in\" on the left side of the screen, which will take you to the Welcome page. Then click on \"Sign up Now\" on the right side of the " "page.     You will be asked to enter the access code listed below, as well as some personal information. Please follow the directions to create your username and password.     Your access code is: [email or web address]  Expires: 2018  2:44 PM     Your access code will  in 90 days. If you need help or a new code, please call your Center clinic or [phone].        Care EveryWhere ID     This is your Care EveryWhere ID. This could be used by other organizations to access your Center medical records  [id number]        Your Vitals Were     Pulse Temperature Respirations Height Pulse Oximetry BMI (Body Mass Index)    75 97.1  F (36.2  C) (Temporal) 16 1.702 m (5' 7\") 98% 25.73 kg/m2       Blood Pressure from Last 3 Encounters:   18 126/67   18 105/61   17 136/80    Weight from Last 3 Encounters:   18 74.5 kg (164 lb 4.8 oz)   17 73.5 kg (162 lb)   10/12/17 70.3 kg (155 lb)              We Performed the Following     Ca27.29  breast tumor marker     CBC with platelets differential     Comprehensive metabolic panel        Primary Care Provider Office Phone # Fax #    Fafnukkz Kamaljit Lynn [phone] [address] [place] [address]        Equal Access to Services     [facility] : Hadii jeff serrano hadasho Soomaali, waaxda luqadaha, qaybta kaalmada adeegyada, trisha price . So [facility] [phone].    ATENCIÓN: Si habla español, tiene a ruiz disposición servicios gratuitos de asistencia lingüística. Llame al [phone].    We comply with applicable federal civil rights laws and [place] laws. We do not discriminate on the basis of race, color, national origin, age, disability, sex, sexual orientation, or gender identity.            Thank you!     Thank you for choosing [facility]  for your care. Our goal is always to provide you with excellent care. Hearing back from our patients is one way we can continue to " improve our services. Please take a few minutes to complete the written survey that you may receive in the mail after your visit with us. Thank you!             Your Updated Medication List - Protect others around you: Learn how to safely use, store and throw away your medicines at www.disposemymeds.org.          This list is accurate as of 3/20/18  2:48 PM.  Always use your most recent med list.                   Brand Name Dispense Instructions for use Diagnosis    cholecalciferol 1000 UNIT tablet    vitamin D3    200 tablet    TAKE TWO TABLETS BY MOUTH EVERY DAY    Routine general medical examination at a health care facility        MG capsule   Generic drug:  docusate sodium     100 capsule    TAKE ONE CAPSULE BY MOUTH EVERY DAY    Slow transit constipation       gabapentin 300 MG capsule    NEURONTIN    270 capsule    Take 3 capsules (900 mg) by mouth 3 times daily    Encounter for other specified aftercare       HYDROcodone-acetaminophen 5-325 MG per tablet    NORCO    75 tablet    Take 1 tablet by mouth every 6 hours as needed for pain Max of 3 tablets per day on bad days. This is a 30 day supply.  Dispense on/after 3/6/18.  To start on 3/8/18    Spasm, Neck pain       metoprolol tartrate 50 MG tablet    LOPRESSOR    180 tablet    TAKE ONE TABLET BY MOUTH TWICE A DAY    Hypertension goal BP (blood pressure) < 130/80       * multivitamin Tabs per tablet     60 tablet    TAKE ONE TABLET BY MOUTH EVERY DAY    Routine history and physical examination of adult       * multivitamin Tabs per tablet     90 tablet    Take 1 tablet by mouth daily    Vitamin deficiency       VITAMIN B-12 PO      Take by mouth daily        VITAMIN B6 PO      Take by mouth daily        * Notice:  This list has 2 medication(s) that are the same as other medications prescribed for you. Read the directions carefully, and ask your doctor or other care provider to review them with you.

## 2018-03-20 NOTE — LETTER
3/20/2018         RE: Yusra Rivas  [address]  [place] [address]        Dear Colleague,    Thank you for referring your patient, Yusra Rivas, to the [facility]. Please see a copy of my visit note below.    Hematology/ Oncology Follow-up Visit:  Mar 20, 2018    Reason for Visit:   Chief Complaint   Patient presents with     Oncology Clinic Visit     6 month follow up for Malignant neoplasm of lung, unspecified laterality, unspecified part of lung and Malignant neoplasm of overlapping sites of right breast in female, estrogen receptor negative     Results     Labs today       Oncologic History:  Malignant neoplasm of overlapping sites of breast in female, estrogen receptor negative (H)  Yusra Rivas was diagnosed in 2000, had a lumpectomy, eventually double mastectomy, TRAM flap procedure, identified right side breast cancer, grade 3 infiltrating ductal cancer, 1 out of 5 positive nodes, ER/CT negative. HER-2/abelino was not done. She received AC followed by Taxol, finished end of 2001. She is on routine surveillance with breast cancer, found to have increasing size of the pulmonary nodules. PET scan 01/2013 found hypermetabolic right upper lobe anterior lateral portion apex 2.4 cm lesion, SUV 14.7. CT-guided right upper lobe biopsy 01/2013 identified poorly differentiated carcinoma, favor lung primary after IHC stain, HER-2 IHC is negative.  She had bilobectomy 2/2013 found to have RLL 1.8 cm adenoCA, mod diff, Grade II; RUL 2.8 cm, mod to poorly diff adeno ca, grade III, total 18 LN - from stations 4, 9. 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  She has been feeling well without any recent complaints of shortness of breath or cough or wheezing.  Denies any nausea vomiting or diarrhea.  She denies any  recent weight loss.  Unfortunately, she continues to smoke 2-4 cigarettes a day.    Review Of Systems:  Constitutional: Negative for fever, chills, and night sweats.  Skin: negative.  Eyes: negative.  Ears/Nose/Throat: negative.  Respiratory: No shortness of breath, dyspnea on exertion, cough, or hemoptysis.  Cardiovascular: negative.  Gastrointestinal: negative.  Genitourinary: negative.  Musculoskeletal: negative.  Neurologic: negative.  Psychiatric: negative.  Hematologic/Lymphatic/Immunologic: negative.  Endocrine: negative.    All other ROS negative unless mentioned in interval history.    Past medical, social, surgical, and family histories reviewed.    Allergies:  Allergies as of 03/20/2018 - Giovanni as Reviewed 03/20/2018   Allergen Reaction Noted     Celexa [citalopram hydrobromide] Difficulty breathing 05/04/2010     Cardizem cd  03/16/2012     Naprosyn [naproxen] Palpitations 12/12/2016     Relafen [nabumetone] Palpitations 12/12/2016       Current Medications:  Current Outpatient Prescriptions   Medication Sig Dispense Refill     HYDROcodone-acetaminophen (NORCO) 5-325 MG per tablet Take 1 tablet by mouth every 6 hours as needed for pain Max of 3 tablets per day on bad days. This is a 30 day supply.  Dispense on/after 3/6/18.  To start on 3/8/18 75 tablet 0     gabapentin (NEURONTIN) 300 MG capsule Take 3 capsules (900 mg) by mouth 3 times daily 270 capsule 3      MG capsule TAKE ONE CAPSULE BY MOUTH EVERY  capsule 3     multivitamin (I-GOLDIE) TABS per tablet Take 1 tablet by mouth daily 90 tablet 1     Pyridoxine HCl (VITAMIN B6 PO) Take by mouth daily       Cyanocobalamin (VITAMIN B-12 PO) Take by mouth daily       metoprolol (LOPRESSOR) 50 MG tablet TAKE ONE TABLET BY MOUTH TWICE A  tablet 2     VITAMIN D3 1000 UNITS tablet TAKE TWO TABLETS BY MOUTH EVERY  tablet 3     Multiple Vitamins-Minerals (I-GOLDIE) TABS TAKE ONE TABLET BY MOUTH EVERY DAY 60 tablet 8        Physical  "Exam:  /67 (BP Location: Left arm, Patient Position: Chair, Cuff Size: Adult Regular)  Pulse 75  Temp 97.1  F (36.2  C) (Temporal)  Resp 16  Ht 1.702 m (5' 7\")  Wt 74.5 kg (164 lb 4.8 oz)  SpO2 98%  BMI 25.73 kg/m2  Wt Readings from Last 12 Encounters:   03/20/18 74.5 kg (164 lb 4.8 oz)   12/05/17 73.5 kg (162 lb)   10/12/17 70.3 kg (155 lb)   10/03/17 72.1 kg (159 lb)   08/29/17 70.2 kg (154 lb 12.8 oz)   08/28/17 69.9 kg (154 lb)   07/07/17 68.5 kg (151 lb)   04/28/17 67.1 kg (148 lb)   02/28/17 68 kg (150 lb)   01/10/17 68 kg (150 lb)   11/30/16 66.2 kg (146 lb)   09/22/16 66.2 kg (146 lb)     ECOG performance status: 0  GENERAL APPEARANCE: Healthy, alert and in no acute distress.  HEENT: Sclerae anicteric. PERRLA. Oropharynx without ulcers, lesions, or thrush.  NECK: Supple. No asymmetry or masses.  LYMPHATICS: No palpable cervical, supraclavicular, axillary, or inguinal lymphadenopathy.  RESP: Lungs clear to auscultation bilaterally without rales, rhonchi or wheezes.  CARDIOVASCULAR: Regular rate and rhythm. Normal S1, S2; no S3 or S4. No murmur, gallop, or rub.  ABDOMEN: Soft, nontender. Bowel sounds normal. No palpable organomegaly or masses.  MUSCULOSKELETAL: Extremities without gross deformities noted. No edema of bilateral lower extremities.  SKIN: No suspicious lesions or rashes.  NEURO: Alert and oriented x 3. Cranial nerves II-XII grossly intact.  PSYCHIATRIC: Mentation and affect appear normal.    Laboratory/Imaging Studies:  No visits with results within 2 Week(s) from this visit.  Latest known visit with results is:    Orders Only on 09/06/2017   Component Date Value Ref Range Status     WBC 09/06/2017 9.0  4.0 - 11.0 10e9/L Final     RBC Count 09/06/2017 4.51  3.8 - 5.2 10e12/L Final     Hemoglobin 09/06/2017 13.7  11.7 - 15.7 g/dL Final     Hematocrit 09/06/2017 41.5  35.0 - 47.0 % Final     MCV 09/06/2017 92  78 - 100 fl Final     MCH 09/06/2017 30.4  26.5 - 33.0 pg Final     MCHC " 09/06/2017 33.0  31.5 - 36.5 g/dL Final     RDW 09/06/2017 12.6  10.0 - 15.0 % Final     Platelet Count 09/06/2017 284  150 - 450 10e9/L Final     Diff Method 09/06/2017 Automated Method   Final     % Neutrophils 09/06/2017 53.3  % Final     % Lymphocytes 09/06/2017 35.0  % Final     % Monocytes 09/06/2017 8.6  % Final     % Eosinophils 09/06/2017 2.8  % Final     % Basophils 09/06/2017 0.3  % Final     Absolute Neutrophil 09/06/2017 4.8  1.6 - 8.3 10e9/L Final     Absolute Lymphocytes 09/06/2017 3.1  0.8 - 5.3 10e9/L Final     Absolute Monocytes 09/06/2017 0.8  0.0 - 1.3 10e9/L Final     Absolute Eosinophils 09/06/2017 0.3  0.0 - 0.7 10e9/L Final     Absolute Basophils 09/06/2017 0.0  0.0 - 0.2 10e9/L Final        No results found for this or any previous visit (from the past 744 hour(s)).    Assessment and plan:  (C34.90) Malignant neoplasm of lung, unspecified laterality, unspecified part of lung (H)  (primary encounter diagnosis)  I reviewed with patient today most recent laboratory tests.  I am going to arrange for imaging study today with a chest CT.  I will see the patient annually or sooner if there are new developments or concerns.  There is no clinical evidence of lung cancer recurrence.    (C50.811,  Z17.1) Malignant neoplasm of overlapping sites of right breast in female, estrogen receptor negative (H)  There is no evidence of breast cancer recurrence.    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  P.    Smoking.  I strongly emphasized the importance of quitting smoking.    The patient is ready to learn, no apparent learning barriers were identified.  Diagnosis and treatment plans were explained to the patient. The patient expressed understanding of the content. The patient asked appropriate questions. The patient questions were answered to her satisfaction.    Chart  documentation with Dragon Voice recognition Software. Although reviewed after completion, some words and grammatical errors may remain.    Again, thank you for allowing me to participate in the care of your patient.        Sincerely,        Sandra Arroyo MD

## 2018-03-20 NOTE — PATIENT INSTRUCTIONS
Please follow up with Dr. Arroyo in 1 year.  Please come 15-30 minutes early for labs.    CT Now!    CT Scan Date/Time:  3/21/18 at 3:00 - Check in at 2:45    Follow Up Date/Time:     If you have any questions or concerns please feel free to call.    If you need to reschedule please call:  Clinic or Lab Appointment - [phone]  Infusion - [phone]  Imaging - [phone]    Jeffry Smith, RN, BSN, OCN   Oncology Care Coordinator RN  [facility]  [phone]

## 2018-03-20 NOTE — ASSESSMENT & PLAN NOTE
Yusra Rivsa was diagnosed in 2000, had a lumpectomy, eventually double mastectomy, TRAM flap procedure, identified right side breast cancer, grade 3 infiltrating ductal cancer, 1 out of 5 positive nodes, ER/CO negative. HER-2/abelino was not done. She received AC followed by Taxol, finished end of 2001. She is on routine surveillance with breast cancer, found to have increasing size of the pulmonary nodules. PET scan 01/2013 found hypermetabolic right upper lobe anterior lateral portion apex 2.4 cm lesion, SUV 14.7. CT-guided right upper lobe biopsy 01/2013 identified poorly differentiated carcinoma, favor lung primary after IHC stain, HER-2 IHC is negative.  She had bilobectomy 2/2013 found to have RLL 1.8 cm adenoCA, mod diff, Grade II; RUL 2.8 cm, mod to poorly diff adeno ca, grade III, total 18 LN - from stations 4, 9. 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

## 2018-03-20 NOTE — PROGRESS NOTES
Hematology/ Oncology Follow-up Visit:  Mar 20, 2018    Reason for Visit:   Chief Complaint   Patient presents with     Oncology Clinic Visit     6 month follow up for Malignant neoplasm of lung, unspecified laterality, unspecified part of lung and Malignant neoplasm of overlapping sites of right breast in female, estrogen receptor negative     Results     Labs today       Oncologic History:  Malignant neoplasm of overlapping sites of breast in female, estrogen receptor negative (H)  Yusra Rivas was diagnosed in 2000, had a lumpectomy, eventually double mastectomy, TRAM flap procedure, identified right side breast cancer, grade 3 infiltrating ductal cancer, 1 out of 5 positive nodes, ER/LA negative. HER-2/abelino was not done. She received AC followed by Taxol, finished end of 2001. She is on routine surveillance with breast cancer, found to have increasing size of the pulmonary nodules. PET scan 01/2013 found hypermetabolic right upper lobe anterior lateral portion apex 2.4 cm lesion, SUV 14.7. CT-guided right upper lobe biopsy 01/2013 identified poorly differentiated carcinoma, favor lung primary after IHC stain, HER-2 IHC is negative.  She had bilobectomy 2/2013 found to have RLL 1.8 cm adenoCA, mod diff, Grade II; RUL 2.8 cm, mod to poorly diff adeno ca, grade III, total 18 LN - from stations 4, 9. 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  She has been feeling well without any recent complaints of shortness of breath or cough or wheezing.  Denies any nausea vomiting or diarrhea.  She denies any recent weight loss.  Unfortunately, she continues to smoke 2-4 cigarettes a day.    Review Of Systems:  Constitutional: Negative for fever, chills, and night sweats.  Skin: negative.  Eyes: negative.  Ears/Nose/Throat:  "negative.  Respiratory: No shortness of breath, dyspnea on exertion, cough, or hemoptysis.  Cardiovascular: negative.  Gastrointestinal: negative.  Genitourinary: negative.  Musculoskeletal: negative.  Neurologic: negative.  Psychiatric: negative.  Hematologic/Lymphatic/Immunologic: negative.  Endocrine: negative.    All other ROS negative unless mentioned in interval history.    Past medical, social, surgical, and family histories reviewed.    Allergies:  Allergies as of 03/20/2018 - Giovanni as Reviewed 03/20/2018   Allergen Reaction Noted     Celexa [citalopram hydrobromide] Difficulty breathing 05/04/2010     Cardizem cd  03/16/2012     Naprosyn [naproxen] Palpitations 12/12/2016     Relafen [nabumetone] Palpitations 12/12/2016       Current Medications:  Current Outpatient Prescriptions   Medication Sig Dispense Refill     HYDROcodone-acetaminophen (NORCO) 5-325 MG per tablet Take 1 tablet by mouth every 6 hours as needed for pain Max of 3 tablets per day on bad days. This is a 30 day supply.  Dispense on/after 3/6/18.  To start on 3/8/18 75 tablet 0     gabapentin (NEURONTIN) 300 MG capsule Take 3 capsules (900 mg) by mouth 3 times daily 270 capsule 3      MG capsule TAKE ONE CAPSULE BY MOUTH EVERY  capsule 3     multivitamin (I-GOLDIE) TABS per tablet Take 1 tablet by mouth daily 90 tablet 1     Pyridoxine HCl (VITAMIN B6 PO) Take by mouth daily       Cyanocobalamin (VITAMIN B-12 PO) Take by mouth daily       metoprolol (LOPRESSOR) 50 MG tablet TAKE ONE TABLET BY MOUTH TWICE A  tablet 2     VITAMIN D3 1000 UNITS tablet TAKE TWO TABLETS BY MOUTH EVERY  tablet 3     Multiple Vitamins-Minerals (I-GOLDIE) TABS TAKE ONE TABLET BY MOUTH EVERY DAY 60 tablet 8        Physical Exam:  /67 (BP Location: Left arm, Patient Position: Chair, Cuff Size: Adult Regular)  Pulse 75  Temp 97.1  F (36.2  C) (Temporal)  Resp 16  Ht 1.702 m (5' 7\")  Wt 74.5 kg (164 lb 4.8 oz)  SpO2 98%  BMI 25.73 " kg/m2  Wt Readings from Last 12 Encounters:   03/20/18 74.5 kg (164 lb 4.8 oz)   12/05/17 73.5 kg (162 lb)   10/12/17 70.3 kg (155 lb)   10/03/17 72.1 kg (159 lb)   08/29/17 70.2 kg (154 lb 12.8 oz)   08/28/17 69.9 kg (154 lb)   07/07/17 68.5 kg (151 lb)   04/28/17 67.1 kg (148 lb)   02/28/17 68 kg (150 lb)   01/10/17 68 kg (150 lb)   11/30/16 66.2 kg (146 lb)   09/22/16 66.2 kg (146 lb)     ECOG performance status: 0  GENERAL APPEARANCE: Healthy, alert and in no acute distress.  HEENT: Sclerae anicteric. PERRLA. Oropharynx without ulcers, lesions, or thrush.  NECK: Supple. No asymmetry or masses.  LYMPHATICS: No palpable cervical, supraclavicular, axillary, or inguinal lymphadenopathy.  RESP: Lungs clear to auscultation bilaterally without rales, rhonchi or wheezes.  CARDIOVASCULAR: Regular rate and rhythm. Normal S1, S2; no S3 or S4. No murmur, gallop, or rub.  ABDOMEN: Soft, nontender. Bowel sounds normal. No palpable organomegaly or masses.  MUSCULOSKELETAL: Extremities without gross deformities noted. No edema of bilateral lower extremities.  SKIN: No suspicious lesions or rashes.  NEURO: Alert and oriented x 3. Cranial nerves II-XII grossly intact.  PSYCHIATRIC: Mentation and affect appear normal.    Laboratory/Imaging Studies:  No visits with results within 2 Week(s) from this visit.  Latest known visit with results is:    Orders Only on 09/06/2017   Component Date Value Ref Range Status     WBC 09/06/2017 9.0  4.0 - 11.0 10e9/L Final     RBC Count 09/06/2017 4.51  3.8 - 5.2 10e12/L Final     Hemoglobin 09/06/2017 13.7  11.7 - 15.7 g/dL Final     Hematocrit 09/06/2017 41.5  35.0 - 47.0 % Final     MCV 09/06/2017 92  78 - 100 fl Final     MCH 09/06/2017 30.4  26.5 - 33.0 pg Final     MCHC 09/06/2017 33.0  31.5 - 36.5 g/dL Final     RDW 09/06/2017 12.6  10.0 - 15.0 % Final     Platelet Count 09/06/2017 284  150 - 450 10e9/L Final     Diff Method 09/06/2017 Automated Method   Final     % Neutrophils 09/06/2017  53.3  % Final     % Lymphocytes 09/06/2017 35.0  % Final     % Monocytes 09/06/2017 8.6  % Final     % Eosinophils 09/06/2017 2.8  % Final     % Basophils 09/06/2017 0.3  % Final     Absolute Neutrophil 09/06/2017 4.8  1.6 - 8.3 10e9/L Final     Absolute Lymphocytes 09/06/2017 3.1  0.8 - 5.3 10e9/L Final     Absolute Monocytes 09/06/2017 0.8  0.0 - 1.3 10e9/L Final     Absolute Eosinophils 09/06/2017 0.3  0.0 - 0.7 10e9/L Final     Absolute Basophils 09/06/2017 0.0  0.0 - 0.2 10e9/L Final        No results found for this or any previous visit (from the past 744 hour(s)).    Assessment and plan:  (C34.90) Malignant neoplasm of lung, unspecified laterality, unspecified part of lung (H)  (primary encounter diagnosis)  I reviewed with patient today most recent laboratory tests.  I am going to arrange for imaging study today with a chest CT.  I will see the patient annually or sooner if there are new developments or concerns.  There is no clinical evidence of lung cancer recurrence.    (C50.811,  Z17.1) Malignant neoplasm of overlapping sites of right breast in female, estrogen receptor negative (H)  There is no evidence of breast cancer recurrence.    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  P.    Smoking.  I strongly emphasized the importance of quitting smoking.    The patient is ready to learn, no apparent learning barriers were identified.  Diagnosis and treatment plans were explained to the patient. The patient expressed understanding of the content. The patient asked appropriate questions. The patient questions were answered to her satisfaction.    Chart documentation with Dragon Voice recognition Software. Although reviewed after completion, some words and grammatical errors may remain.

## 2018-03-21 ENCOUNTER — HOSPITAL ENCOUNTER (OUTPATIENT)
Dept: CT IMAGING | Facility: CLINIC | Age: 63
Discharge: HOME | End: 2018-03-21
Attending: INTERNAL MEDICINE | Admitting: INTERNAL MEDICINE
Payer: MEDICARE

## 2018-03-21 DIAGNOSIS — C34.90 MALIGNANT NEOPLASM OF LUNG, UNSPECIFIED LATERALITY, UNSPECIFIED PART OF LUNG (H): ICD-10-CM

## 2018-03-21 PROCEDURE — 25000125 ZZHC RX 250: Performed by: INTERNAL MEDICINE

## 2018-03-21 PROCEDURE — 25000128 H RX IP 250 OP 636: Performed by: INTERNAL MEDICINE

## 2018-03-21 PROCEDURE — 71260 CT THORAX DX C+: CPT

## 2018-03-21 RX ORDER — IOPAMIDOL 755 MG/ML
100 INJECTION, SOLUTION INTRAVASCULAR ONCE
Status: COMPLETED | OUTPATIENT
Start: 2018-03-21 | End: 2018-03-21

## 2018-03-21 RX ORDER — METOPROLOL TARTRATE 50 MG
TABLET ORAL
Qty: 180 TABLET | Refills: 2 | Status: SHIPPED | OUTPATIENT
Start: 2018-03-21 | End: 2018-12-05

## 2018-03-21 RX ADMIN — IOPAMIDOL 75 ML: 755 INJECTION, SOLUTION INTRAVENOUS at 15:13

## 2018-03-21 RX ADMIN — SODIUM CHLORIDE, PRESERVATIVE FREE 75 ML: 5 INJECTION INTRAVENOUS at 15:13

## 2018-03-21 NOTE — TELEPHONE ENCOUNTER
Prescription approved per [facility] [place].    Paula Flores RN

## 2018-03-23 DIAGNOSIS — M54.2 NECK PAIN: ICD-10-CM

## 2018-03-23 DIAGNOSIS — R25.2 SPASM: ICD-10-CM

## 2018-03-23 NOTE — TELEPHONE ENCOUNTER
Received call from patient requesting refill(s) of HYDROcodone-acetaminophen (NORCO) 5-325 MG per tablet    Last picked up from pharmacy on 03/07/18    Pt last seen by prescribing provider on 01/15/18 - has had injection since  Next appt scheduled for : none.  Waiting to get scheduled for spinal cord stimulator implant     checked in the past 6 months? Yes If no, print current report and give to RN    Last urine drug screen date 11/30/16  Current opioid agreement on file (completed within the last year) No Date of opioid agreement: 12/21/16    Processing (pick one and delete the others):      Mail [facility] pharmacy   [address] [facility] [address]    Will route to nursing pool for review and preparation of prescription(s).

## 2018-03-23 NOTE — TELEPHONE ENCOUNTER
Medication refill information reviewed.     Last due:   Dispense on/after 3/6/18.  To start on 3/8/18    Due date:  4/7/18    Weaning instructions:  N/A    Prescriptions prepped for review.     Angélica Benites RN-BSN  [facility]

## 2018-03-23 NOTE — TELEPHONE ENCOUNTER
Patient left VM at 11:49 am    Rx- Hydrocodone 5-325 MG        Lela PALACIOS    [facility]

## 2018-03-26 ENCOUNTER — TELEPHONE (OUTPATIENT)
Dept: FAMILY MEDICINE | Facility: OTHER | Age: 63
End: 2018-03-26

## 2018-03-26 RX ORDER — HYDROCODONE BITARTRATE AND ACETAMINOPHEN 5; 325 MG/1; MG/1
1 TABLET ORAL EVERY 6 HOURS PRN
Qty: 75 TABLET | Refills: 0 | Status: SHIPPED | OUTPATIENT
Start: 2018-03-26 | End: 2018-05-01

## 2018-03-26 NOTE — TELEPHONE ENCOUNTER
Signed Rx mailed from [facility] on 03/26/18. Mailing to [facility]. Patient was informed.

## 2018-03-26 NOTE — TELEPHONE ENCOUNTER
Yusra Rivas is a 62 year old female-     NURSING ASSESSMENT:  Description:  Patient calls with abdominal discomfort. She states that she is constipated and is having a hard time passing a large stool. Patient felt the urge to have a bowel movement last night, but there was a large formed stool that she was unable to pass. She has tried MOM and Miralax with some improvement. She DENIES severe abdominal pain, difficulty breathing, blood in the stool, vomiting or nausea. Patient also tried a suppository, but feels like this didn't get much past the hard stool. Patient states that she is not drinking as much water as she should be. DENIES abdominal distention.   Onset/duration:  1 day   Precip. factors:  Unknown   Improves/worsens symptoms:  Not improving with current home care measures.   Pain scale (0-10)   6/10- when trying to have a bowel movement. Denies pain when resting.   Last exam/Treatment:  12/5/2017  Allergies:   Allergies   Allergen Reactions     Celexa [Citalopram Hydrobromide] Difficulty breathing     Increased blood pressure, chest felt heavy and shortness of breath      Cardizem Cd      Irregular heart rate     Naprosyn [Naproxen] Palpitations     Relafen [Nabumetone] Palpitations     Fast heart rate     NURSING PLAN: Nursing advice to patient Additional home care measures. IF nnot improving with this by tomorrow, pt advised to be seen.     RECOMMENDED DISPOSITION:  Home care advice - Continue Miralax and MOM as directed on the bottle. Try a fleets enema if able to tolerate and push lots of water, apple, prune or pear juices.   Will comply with recommendation: Yes  If further questions/concerns or if symptoms do not improve, worsen or new symptoms develop, call your PCP or [facility] as soon as possible.      Guideline used: Stools, abnormal   Telephone Triage Protocols for Nurses, Fifth Edition, Geni Higgins RN

## 2018-03-26 NOTE — TELEPHONE ENCOUNTER
Signed Prescriptions:                        Disp   Refills    HYDROcodone-acetaminophen (NORCO) 5-325 MG*75 tab*0        Sig: Take 1 tablet by mouth every 6 hours as needed for           pain Max of 3 tablets per day on bad days. This           is a 30 day supply.  Dispense on/after 4/5/18.            To start on 4/7/18  Authorizing Provider: ANTOINE SU    Reviewed, printed, signed in [facility].  Placed in MA basket for processing.    Antoine Narvaez MD  [facility]

## 2018-03-26 NOTE — TELEPHONE ENCOUNTER
Reason for call:  Patient reporting a symptom    Symptom or request: Patient is requesting a call back from triage to discuss an issue with a blockage of stool        Duration (how long have symptoms been present): one day    Have you been treated for this before? Yes    Additional comments:     Phone Number patient can be reached at:  Home number on file [phone] (home)    Best Time:      Can we leave a detailed message on this number:  YES    Call taken on 3/26/2018 at 3:17 PM by Vidhi Vargas

## 2018-04-20 NOTE — TELEPHONE ENCOUNTER
FUTURE VISIT INFORMATION      FUTURE VISIT INFORMATION:    Date: 04/26/2018    Time: 2:00    Location: [facility]  REFERRAL INFORMATION:    Referring provider:  CHRISTOPHER    Referring providers clinic:  [facility]    Reason for visit/diagnosis  SWOLLEN GLAND NEAR THYROID        RECORDS STATUS    ALL RECORDS IN CARE EVERYWHERE  OFFICE VISIT: 03/21/2018- REFERRAL  12/08/2017- [facility]

## 2018-04-26 ENCOUNTER — PRE VISIT (OUTPATIENT)
Dept: OTOLARYNGOLOGY | Facility: CLINIC | Age: 63
End: 2018-04-26

## 2018-04-26 ENCOUNTER — OFFICE VISIT (OUTPATIENT)
Dept: OTOLARYNGOLOGY | Facility: CLINIC | Age: 63
End: 2018-04-26
Payer: MEDICARE

## 2018-04-26 VITALS — WEIGHT: 156 LBS | BODY MASS INDEX: 24.48 KG/M2 | HEIGHT: 67 IN

## 2018-04-26 DIAGNOSIS — J31.0 CHRONIC RHINITIS, UNSPECIFIED TYPE: ICD-10-CM

## 2018-04-26 DIAGNOSIS — R22.1 NECK SWELLING: Primary | ICD-10-CM

## 2018-04-26 ASSESSMENT — PAIN SCALES - GENERAL: PAINLEVEL: NO PAIN (0)
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
## 2018-04-26 NOTE — PATIENT INSTRUCTIONS
The patient presents with a history of lung cancer and multiple difficulties since the conclusion of this therapy. She will be referred for a CT scan of the neck and for a consultation with an Internal Medicine physician. The patient will also be referred for an allergy evaluation.

## 2018-04-26 NOTE — NURSING NOTE
"Chief Complaint   Patient presents with     Consult     neck mass      Height 1.69 m (5' 6.53\"), weight 70.8 kg (156 lb), not currently breastfeeding.    Jason Najera LPN    "

## 2018-04-26 NOTE — MR AVS SNAPSHOT
After Visit Summary   4/26/2018    Yusra Rivas    MRN: [id number]           Patient Information     Date Of Birth          1955        Visit Information        Provider Department      4/26/2018 2:00 PM Patrick Quevedo MD [facility]        Today's Diagnoses     Neck swelling    -  1    Chronic rhinitis, unspecified type          Care Instructions    The patient presents with a history of lung cancer and multiple difficulties since the conclusion of this therapy. She will be referred for a CT scan of the neck and for a consultation with an Internal Medicine physician. The patient will also be referred for an allergy evaluation.           Follow-ups after your visit        Additional Services     INTERNAL MEDICINE REFERRAL       History of lung cancer and infections following therapy.                  Your next 10 appointments already scheduled     Apr 27, 2018 12:30 PM CDT   CT SOFT TISSUE NECK W/O CONTRAST with PHCT1   [facility] CT Scan ([facility])    [address] [place] [id number] [phone]           Please bring any scans or X-rays taken at other hospitals, if similar tests were done. Also bring a list of your medicines, including vitamins, minerals and over-the-counter drugs. It is safest to leave personal items at home.  Be sure to tell your doctor:   If you have any allergies.   If there s any chance you are pregnant.   If you are breastfeeding.  You do not need to do anything special to prepare for this exam.  Please wear loose clothing, such as a sweat suit or jogging clothes. Avoid snaps, zippers and other metal. We may ask you to undress and put on a hospital gown.            May 15, 2018 10:30 AM CDT   (Arrive by 10:15 AM)   New Patient Visit with Margarito Daily DO   [facility] ([facility])    [address]  4th Floor  [facility] [address]  "  [phone]            Mar 19, 2019  2:30 PM CDT   [facility]    [address] [place] [id number] [phone]              Future tests that were ordered for you today     Open Future Orders        Priority Expected Expires Ordered    CT Soft tissue neck w/o contrast Routine  2019            Who to contact     Please call your clinic at [phone] to:    Ask questions about your health    Make or cancel appointments    Discuss your medicines    Learn about your test results    Speak to your doctor            Additional Information About Your Visit        S[employer] Information     [employer] is an electronic gateway that provides easy, online access to your medical records. With [employer], you can request a clinic appointment, read your test results, renew a prescription or communicate with your care team.     To sign up for [employer] visit the website at [email or web address]   You will be asked to enter the access code listed below, as well as some personal information. Please follow the directions to create your username and password.     Your access code is: [email or web address]  Expires: 2018  2:44 PM     Your access code will  in 90 days. If you need help or a new code, please contact your [facility] or call [phone] for assistance.        Care EveryWhere ID     This is your Care EveryWhere ID. This could be used by other organizations to access your [facility] medical records  [id number]        Your Vitals Were     Height BMI (Body Mass Index)                1.69 m (5' 6.53\") 24.78 kg/m2           Blood Pressure from Last 3 Encounters:   18 126/67   18 105/61   17 136/80    Weight from Last 3 Encounters:   18 70.8 kg (156 lb)   18 74.5 kg (164 lb 4.8 oz)   17 73.5 kg (162 lb)              We Performed the Following     " INTERNAL MEDICINE REFERRAL     OFFICE CONSULTATION,LEVEL III        Primary Care Provider Office Phone # Fax #    Munir Lynn,  [phone]       [address] [place] [address]        Equal Access to Services     ROSEMARY EDUARDO : Luca serrano navino Somaryseali, waaxda luqadaha, qaybta kaalmada adeegyada, waxgoyo johnstonn sammy white laazul wilson. So [place] -[phone].    ATENCIÓN: Si habla español, tiene a ruiz disposición servicios gratuitos de asistencia lingüística. Llame al [phone].    We comply with applicable federal civil rights laws and [place] laws. We do not discriminate on the basis of race, color, national origin, age, disability, sex, sexual orientation, or gender identity.            Thank you!     Thank you for choosing [facility]  for your care. Our goal is always to provide you with excellent care. Hearing back from our patients is one way we can continue to improve our services. Please take a few minutes to complete the written survey that you may receive in the mail after your visit with us. Thank you!             Your Updated Medication List - Protect others around you: Learn how to safely use, store and throw away your medicines at www.disposemymeds.org.          This list is accurate as of 4/26/18  2:31 PM.  Always use your most recent med list.                   Brand Name Dispense Instructions for use Diagnosis    cholecalciferol 1000 UNIT tablet    vitamin D3    200 tablet    TAKE TWO TABLETS BY MOUTH EVERY DAY    Routine general medical examination at a health care facility        MG capsule   Generic drug:  docusate sodium     100 capsule    TAKE ONE CAPSULE BY MOUTH EVERY DAY    Slow transit constipation       gabapentin 300 MG capsule    NEURONTIN    270 capsule    Take 3 capsules (900 mg) by mouth 3 times daily    Encounter for other specified aftercare       HYDROcodone-acetaminophen 5-325 MG per tablet    NORCO    75  tablet    Take 1 tablet by mouth every 6 hours as needed for pain Max of 3 tablets per day on bad days. This is a 30 day supply.  Dispense on/after 4/5/18.  To start on 4/7/18    Spasm, Neck pain       metoprolol tartrate 50 MG tablet    LOPRESSOR    180 tablet    TAKE ONE TABLET BY MOUTH TWICE A DAY    Hypertension goal BP (blood pressure) < 130/80       * multivitamin Tabs per tablet     60 tablet    TAKE ONE TABLET BY MOUTH EVERY DAY    Routine history and physical examination of adult       * multivitamin Tabs per tablet     90 tablet    Take 1 tablet by mouth daily    Vitamin deficiency       VITAMIN B-12 PO      Take by mouth daily        VITAMIN B6 PO      Take by mouth daily        * Notice:  This list has 2 medication(s) that are the same as other medications prescribed for you. Read the directions carefully, and ask your doctor or other care provider to review them with you.
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
## 2018-04-27 ENCOUNTER — HOSPITAL ENCOUNTER (OUTPATIENT)
Dept: CT IMAGING | Facility: CLINIC | Age: 63
Discharge: HOME | End: 2018-04-27
Attending: OTOLARYNGOLOGY | Admitting: OTOLARYNGOLOGY
Payer: MEDICARE

## 2018-04-27 DIAGNOSIS — R22.1 NECK SWELLING: ICD-10-CM

## 2018-04-27 PROCEDURE — 25000128 H RX IP 250 OP 636: Performed by: OTOLARYNGOLOGY

## 2018-04-27 PROCEDURE — 25000125 ZZHC RX 250: Performed by: OTOLARYNGOLOGY

## 2018-04-27 PROCEDURE — 70491 CT SOFT TISSUE NECK W/DYE: CPT

## 2018-04-27 RX ORDER — IOPAMIDOL 755 MG/ML
500 INJECTION, SOLUTION INTRAVASCULAR ONCE
Status: COMPLETED | OUTPATIENT
Start: 2018-04-27 | End: 2018-04-27

## 2018-04-27 RX ADMIN — SODIUM CHLORIDE 69 ML: 9 INJECTION, SOLUTION INTRAVENOUS at 12:55

## 2018-04-27 RX ADMIN — IOPAMIDOL 80 ML: 755 INJECTION, SOLUTION INTRAVENOUS at 12:56

## 2018-04-30 ENCOUNTER — TELEPHONE (OUTPATIENT)
Dept: OTOLARYNGOLOGY | Facility: CLINIC | Age: 63
End: 2018-04-30

## 2018-04-30 ENCOUNTER — TELEPHONE (OUTPATIENT)
Dept: PALLIATIVE MEDICINE | Facility: CLINIC | Age: 63
End: 2018-04-30

## 2018-04-30 DIAGNOSIS — R25.2 SPASM: ICD-10-CM

## 2018-04-30 DIAGNOSIS — Z79.891 ENCOUNTER FOR LONG-TERM OPIATE ANALGESIC USE: Primary | ICD-10-CM

## 2018-04-30 DIAGNOSIS — M54.2 NECK PAIN: ICD-10-CM

## 2018-04-30 NOTE — TELEPHONE ENCOUNTER
Notes Recorded by Patrick Quevedo MD on 4/27/2018 at 2:13 PM  Isis,    Normal neck CT    Patrick Quevedo MD on 4/27/2018 at 2:13 PM        Patient informed of normal CT result. Dr. Quevedo has referred her to Internal Medicine. Patient is scheduled to see them 5/15/18.     Isis Orellana, RN Care Coordinator  [facility]ck Surgery  Direct contact: [phone]

## 2018-04-30 NOTE — TELEPHONE ENCOUNTER
Pt STONEY at 1139 --    Reason for call:  Medication   If this is a refill request, has the caller requested the refill from the pharmacy already? N/A  Will the patient be using a [facility]? Yes  Name of the pharmacy and phone number for the current request: [facility], [place] - [address]     Name of the medication requested: HYDROcodone-acetaminophen (NORCO) 5-325 MG per tablet    Other request: Please mail to  Pharmacy in [place]    Phone number to reach patient:  Home number on file [phone] (home)      Ainsley Sky    [facility]

## 2018-04-30 NOTE — TELEPHONE ENCOUNTER
Received call from patient requesting refill(s) of HYDROcodone-acetaminophen (NORCO) 5-325 MG per tablet    Last picked up from pharmacy on 04/10/18    Pt last seen by prescribing provider on 10/03/17 office visit, 01/22/18 injection  Next appt scheduled for : none     checked in the past 6 months? Yes If no, print current report and give to RN    Last urine drug screen date 11/30/16  Current opioid agreement on file (completed within the last year) Yes Date of opioid agreement: 12/21/16    Processing (pick one and delete the others):      Mail to [facility]     [address] [facility] [address]  Will route to nursing pool for review and preparation of prescription(s).

## 2018-05-01 RX ORDER — HYDROCODONE BITARTRATE AND ACETAMINOPHEN 5; 325 MG/1; MG/1
1 TABLET ORAL EVERY 6 HOURS PRN
Qty: 75 TABLET | Refills: 0 | Status: SHIPPED | OUTPATIENT
Start: 2018-05-01 | End: 2018-05-30

## 2018-05-01 NOTE — TELEPHONE ENCOUNTER
Signed script for Norco was given to [facility] Nurse to schedule a Nurse visit. Patient is OVERDUE for OA and UDS.       Chauncey Flores MA

## 2018-05-01 NOTE — TELEPHONE ENCOUNTER
Signed Prescriptions:                        Disp   Refills    HYDROcodone-acetaminophen (NORCO) 5-325 MG*75 tab*0        Sig: Take 1 tablet by mouth every 6 hours as needed for           pain Max of 3 tablets per day on bad days. This           is a 30 day supply.  Dispense on/after 5/8/18.            To start on 5/10/18  Authorizing Provider: ANTOINE SU    Reviewed, printed, signed in [facility].  Placed in [place] basket for processing.  Needs OA and UDS.    Antoine HOGUE. Sharri Narvaez MD  [facility]

## 2018-05-01 NOTE — TELEPHONE ENCOUNTER
Medication refill information reviewed. OVERDUE for OA and UDS    Due date for Norco is 5/10/2018      Prescriptions prepped for review.     Will route to provider.     Abril Culp RN, [facility]  Pain Clinic Care Coordinator

## 2018-05-01 NOTE — TELEPHONE ENCOUNTER
Urine drug screen to be done before the script can be mailed to her pharmacy.  This can be done at any [facility].    Called pt and relayed the above.  She will do the urine drug screen at the [facility].  She will call once done.     The norco script was placed in trigger point injection cart.  To be mailed once pt given urine specimen for urine drug screen.    Angélica Benites RN-BSN  [facility]

## 2018-05-02 NOTE — TELEPHONE ENCOUNTER
5/1 409pm    They need an order faxed in for UDS. Fax to SHANEKA Aguilar. [phone]    Lucia Caballero    [facility] Management Clinic

## 2018-05-02 NOTE — TELEPHONE ENCOUNTER
The signed norco scripts(s) were mailed to     [facility] [place] - [address]   [address] [facility] [address]  Phone: [phone] Fax: [phone]    Called the [facility] clinic and this is not the clinic the pt was at.  She was at the [facility] in [place].  This is an outside [facility] facility and we would not have access to the results.  Was also told that the [facility] does NOT do urine drug screen there.    Called pt and informed her that the scripts were mailed.  We are going to cancel the order for the urine drug screen for now due to the above situation.    Angélica Benites, RN-BSN  [facility]

## 2018-05-03 NOTE — TELEPHONE ENCOUNTER
Contact Attempt      Outreach attempted x 1.  Left message on voicemail with call back information and requested return call to discuss UA and OA.     Plan:  Will await for CB.     Abril Culp RN, [facility]  Pain Clinic Care Coordinator

## 2018-05-03 NOTE — TELEPHONE ENCOUNTER
Pt. LM at 0942 wondering if she could go to [place] to do a UA. She states that is more convenient for her. Phone #[phone]       Ainsley Sky    [facility]

## 2018-05-04 NOTE — TELEPHONE ENCOUNTER
Pt coming in 5/8/2018 for appt and will do UDS and OA at that time.     Abril Culp RN, [facility]  Pain Clinic Care Coordinator

## 2018-05-08 ENCOUNTER — TELEPHONE (OUTPATIENT)
Dept: PALLIATIVE MEDICINE | Facility: CLINIC | Age: 63
End: 2018-05-08

## 2018-05-08 ENCOUNTER — OFFICE VISIT (OUTPATIENT)
Dept: PALLIATIVE MEDICINE | Facility: CLINIC | Age: 63
End: 2018-05-08
Payer: MEDICARE

## 2018-05-08 VITALS
HEIGHT: 67 IN | SYSTOLIC BLOOD PRESSURE: 112 MMHG | DIASTOLIC BLOOD PRESSURE: 73 MMHG | BODY MASS INDEX: 24.8 KG/M2 | HEART RATE: 76 BPM | WEIGHT: 158 LBS

## 2018-05-08 DIAGNOSIS — M54.41 CHRONIC BILATERAL LOW BACK PAIN WITH BILATERAL SCIATICA: ICD-10-CM

## 2018-05-08 DIAGNOSIS — F41.1 GENERALIZED ANXIETY DISORDER: ICD-10-CM

## 2018-05-08 DIAGNOSIS — M54.42 CHRONIC BILATERAL LOW BACK PAIN WITH BILATERAL SCIATICA: ICD-10-CM

## 2018-05-08 DIAGNOSIS — G89.4 CHRONIC PAIN SYNDROME: ICD-10-CM

## 2018-05-08 DIAGNOSIS — M16.11 PRIMARY OSTEOARTHRITIS OF RIGHT HIP: Primary | ICD-10-CM

## 2018-05-08 DIAGNOSIS — M79.18 MYOFASCIAL PAIN: ICD-10-CM

## 2018-05-08 DIAGNOSIS — G89.29 CHRONIC BILATERAL LOW BACK PAIN WITH BILATERAL SCIATICA: ICD-10-CM

## 2018-05-08 DIAGNOSIS — G89.29 CHRONIC HIP PAIN, RIGHT: ICD-10-CM

## 2018-05-08 DIAGNOSIS — M25.551 CHRONIC HIP PAIN, RIGHT: ICD-10-CM

## 2018-05-08 DIAGNOSIS — M43.25 FUSION OF SPINE, THORACOLUMBAR REGION: ICD-10-CM

## 2018-05-08 DIAGNOSIS — Z79.891 ADMISSION FOR LONG-TERM OPIATE ANALGESIC USE: ICD-10-CM

## 2018-05-08 PROCEDURE — 99214 OFFICE O/P EST MOD 30 MIN: CPT | Performed by: ANESTHESIOLOGY

## 2018-05-08 PROCEDURE — 80307 DRUG TEST PRSMV CHEM ANLYZR: CPT | Mod: 90 | Performed by: ANESTHESIOLOGY

## 2018-05-08 PROCEDURE — 99000 SPECIMEN HANDLING OFFICE-LAB: CPT | Performed by: ANESTHESIOLOGY

## 2018-05-08 ASSESSMENT — PAIN SCALES - GENERAL: PAINLEVEL: MODERATE PAIN (5)

## 2018-05-08 NOTE — MR AVS SNAPSHOT
After Visit Summary   5/8/2018    Yusra Rivas    MRN: [id number]           Patient Information     Date Of Birth          1955        Visit Information        Provider Department      5/8/2018 2:00 PM Antoine Ibrahim MD [facility]        Today's Diagnoses     Primary osteoarthritis of right hip    -  1    Admission for long-term opiate analgesic use          Care Instructions    Assessment:   1.  Chronic pain syndrome  2.  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  Diffuse myofascial pain (muscle pain)  4.  Muscle spasm/dystonia  5.  History of closed head injury  6.  Lumbar radiculopathy (radiating pain)  7.  Depression/anxiety  8.  Right foot drop - intermittent  9.  Proximal lower extremity weakness  10.  Chronic headache  11.  History of breast cancer, lung cancer  12.  Right hip and groin pain      Plan:  1. Physical Therapy:  BENNY - physical therapy eval and treat  2. Clinical Health Psychologist to address issues of relaxation, behavioral change, coping style, and other factors important to improvement.  deferred  3. Diagnostic Studies:    1. Consider right hip MRI scan for hip pain  2. Consider lumbar spine CT vs MRI scan due to worsened right lower extremity pain  4. Medication Management:    1. Continue Norco 5/325 2-3 per day as needed  2. Continue Gabapentin 900 mg TID  3. Continue Flexeril or Robaxin as needed for muscle spasm  4. Discontinue Zonegran  5. Further procedures recommended:   1. Right hip joint injection  2. Consider right L2-3, L3-4 transforaminal epidural steroid injection  3. Hold on SCS implant at this time due to symptoms at time of lead pull  6. Recommendations to PCP: continue current treatment  7. Order for UDS placed today  8. Follow up: for hip injection and in 8-10 weeks      ----------------------------------------------------------------  Nurse Triage line:  [phone]   Call this number with any questions or concerns.  You may leave a detailed message anytime. Calls are typically returned Monday through Friday between 8 AM and 4:30 PM. We usually get back to you within 2 business days depending on the issue/request.       Medication refills:    For non-narcotic medications, call your pharmacy directly to request a refill. The pharmacy will contact the [facility] for authorization. Please allow 3-4 days for these refills to be processed.     For narcotic refills, call the nurse triage line or send a [employer] message. Please contact us 7-10 days before your refill is due. The message MUST include the name of the specific medication(s) requested and how you would like to receive the prescription(s). The options are as follows:    Pain Clinic staff can mail the prescription to your pharmacy. Please tell us the name of the pharmacy.    You may pick the prescription up at the Pain Clinic (tell us the location) or during a clinic visit with your pain provider    Pain Clinic staff can deliver the prescription to the [facility] pharmacy in the clinic building. Please tell us the location.      Scheduling number: [phone].  Call this number to schedule or change appointments.    We believe regular attendance is key to your success in our program.    Any time you are unable to keep your appointment we ask that you call us at least 24 hours in advance to let us know. This will allow us to offer the appointment time to another patient.               Follow-ups after your visit        Additional Services     [facility] PT, HAND, AND CHIROPRACTIC REFERRAL           PAIN INJECTION EVAL/TREAT/FOLLOW UP                 Your next 10 appointments already scheduled     May 15, 2018 10:30 AM CDT   (Arrive by 10:15 AM)   New Patient Visit with Margarito Daily DO   [facility] ([facility])    [address] [place] [id number]   [phone]            Mar 19, 2019  2:30 PM CDT  "  Return Visit with Sandra Arroyo MD   [facility] ([facility])    [address] [place] [id number] [phone]              Future tests that were ordered for you today     Open Future Orders        Priority Expected Expires Ordered    Pain Drug Scr UR W Rptd Meds Routine 2018            Who to contact     If you have questions or need follow up information about today's clinic visit or your schedule please contact [facility] directly at [phone].  Normal or non-critical lab and imaging results will be communicated to you by [employer], letter or phone within 4 business days after the clinic has received the results. If you do not hear from us within 7 days, please contact the clinic through [employer] or phone. If you have a critical or abnormal lab result, we will notify you by phone as soon as possible.  Submit refill requests through [email or web address] or call your pharmacy and they will forward the refill request to us. Please allow 3 business days for your refill to be completed.          Additional Information About Your Visit        MyChart Information     [email or web address] lets you send messages to your doctor, view your test results, renew your prescriptions, schedule appointments and more. To sign up, go to [email or web address] . Click on \"Log in\" on the left side of the screen, which will take you to the Welcome page. Then click on \"Sign up Now\" on the right side of the page.     You will be asked to enter the access code listed below, as well as some personal information. Please follow the directions to create your username and password.     Your access code is: [email or web address]  Expires: 2018  2:44 PM     Your access code will  in 90 days. If you need help or a new code, please call your [facility] or [phone].        Care EveryWhere ID     This is your Care EveryWhere ID. This could be used by other organizations to " "access your [place] medical records  [id number]        Your Vitals Were     Pulse Height BMI (Body Mass Index)             76 1.702 m (5' 7\") 24.75 kg/m2          Blood Pressure from Last 3 Encounters:   05/08/18 112/73   03/20/18 126/67   01/22/18 105/61    Weight from Last 3 Encounters:   05/08/18 71.7 kg (158 lb)   04/26/18 70.8 kg (156 lb)   03/20/18 74.5 kg (164 lb 4.8 oz)              We Performed the Following     [facility] PT, HAND, AND CHIROPRACTIC REFERRAL     PAIN INJECTION EVAL/TREAT/FOLLOW UP        Primary Care Provider Office Phone # Fax #    Munir Mari Shane,  [phone]       [address] [place] [address]        Equal Access to Services     [facility]: Hadii jeff serrano hadasho Soomaali, waaxda luqadaha, qaybta kaalmada adeegyada, trisha price . So [facility] [place] [phone].    ATENCIÓN: Si habla español, tiene a ruiz disposición servicios gratuitos de asistencia lingüística. [facility] [phone].    We comply with applicable federal civil rights laws and [place] laws. We do not discriminate on the basis of race, color, national origin, age, disability, sex, sexual orientation, or gender identity.            Thank you!     Thank you for choosing [facility]  for your care. Our goal is always to provide you with excellent care. Hearing back from our patients is one way we can continue to improve our services. Please take a few minutes to complete the written survey that you may receive in the mail after your visit with us. Thank you!             Your Updated Medication List - Protect others around you: Learn how to safely use, store and throw away your medicines at www.disposemymeds.org.          This list is accurate as of 5/8/18  2:51 PM.  Always use your most recent med list.                   Brand Name Dispense Instructions for use Diagnosis    cholecalciferol 1000 UNIT tablet    vitamin D3    200 tablet    TAKE TWO TABLETS BY MOUTH " EVERY DAY    Routine general medical examination at a health care facility        MG capsule   Generic drug:  docusate sodium     100 capsule    TAKE ONE CAPSULE BY MOUTH EVERY DAY    Slow transit constipation       gabapentin 300 MG capsule    NEURONTIN    270 capsule    Take 3 capsules (900 mg) by mouth 3 times daily    Encounter for other specified aftercare       HYDROcodone-acetaminophen 5-325 MG per tablet    NORCO    75 tablet    Take 1 tablet by mouth every 6 hours as needed for pain Max of 3 tablets per day on bad days. This is a 30 day supply.  Dispense on/after 5/8/18.  To start on 5/10/18    Spasm, Neck pain       metoprolol tartrate 50 MG tablet    LOPRESSOR    180 tablet    TAKE ONE TABLET BY MOUTH TWICE A DAY    Hypertension goal BP (blood pressure) < 130/80       * multivitamin Tabs per tablet     60 tablet    TAKE ONE TABLET BY MOUTH EVERY DAY    Routine history and physical examination of adult       * multivitamin Tabs per tablet     90 tablet    Take 1 tablet by mouth daily    Vitamin deficiency       VITAMIN B-12 PO      Take by mouth daily        VITAMIN B6 PO      Take by mouth daily        * Notice:  This list has 2 medication(s) that are the same as other medications prescribed for you. Read the directions carefully, and ask your doctor or other care provider to review them with you.

## 2018-05-08 NOTE — PATIENT INSTRUCTIONS
Assessment:   1.  Chronic pain syndrome  2.  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  Diffuse myofascial pain (muscle pain)  4.  Muscle spasm/dystonia  5.  History of closed head injury  6.  Lumbar radiculopathy (radiating pain)  7.  Depression/anxiety  8.  Right foot drop - intermittent  9.  Proximal lower extremity weakness  10.  Chronic headache  11.  History of breast cancer, lung cancer  12.  Right hip and groin pain      Plan:  1. Physical Therapy:  BENNY - physical therapy eval and treat  2. Clinical Health Psychologist to address issues of relaxation, behavioral change, coping style, and other factors important to improvement.  deferred  3. Diagnostic Studies:    1. Consider right hip MRI scan for hip pain  2. Consider lumbar spine CT vs MRI scan due to worsened right lower extremity pain  4. Medication Management:    1. Continue Norco 5/325 2-3 per day as needed  2. Continue Gabapentin 900 mg TID  3. Continue Flexeril or Robaxin as needed for muscle spasm  4. Discontinue Zonegran  5. Further procedures recommended:   1. Right hip joint injection  2. Consider right L2-3, L3-4 transforaminal epidural steroid injection  3. Hold on SCS implant at this time due to symptoms at time of lead pull  6. Recommendations to PCP: continue current treatment  7. Order for UDS placed today  8. Follow up: for hip injection and in 8-10 weeks      ----------------------------------------------------------------  Nurse Triage line:  [phone]   Call this number with any questions or concerns. You may leave a detailed message anytime. Calls are typically returned Monday through Friday between 8 AM and 4:30 PM. We usually get back to you within 2 business days depending on the issue/request.       Medication refills:    For non-narcotic medications, call your pharmacy directly to request a refill. The pharmacy will contact the [facility] for authorization. Please allow 3-4 days for these refills to be  processed.     For narcotic refills, call the nurse triage line or send a [email or web address] message. Please contact us 7-10 days before your refill is due. The message MUST include the name of the specific medication(s) requested and how you would like to receive the prescription(s). The options are as follows:    Pain Clinic staff can mail the prescription to your pharmacy. Please tell us the name of the pharmacy.    You may pick the prescription up at the Pain Clinic (tell us the location) or during a clinic visit with your pain provider    Pain Clinic staff can deliver the prescription to the [place] pharmacy in the clinic building. Please tell us the location.      Scheduling number: [phone].  Call this number to schedule or change appointments.    We believe regular attendance is key to your success in our program.    Any time you are unable to keep your appointment we ask that you call us at least 24 hours in advance to let us know. This will allow us to offer the appointment time to another patient.

## 2018-05-08 NOTE — PROGRESS NOTES
[facility]    Date of visit: 5/8/2018    Chief complaint:   Chief Complaint   Patient presents with     Pain       Interval history:  Yusra Rivas was last seen by me on 1/22/18 for spinal cord stimulator trial.  Her last office visit was on 10/3/17.      Recommendations/plan at the last visit included:  Plan:  1. Physical Therapy:  Deferred - patient has undergone multiple recent physical therapy sessions within the past year with increased pain.  She has also undergone Chiropractic treatments with increased pain.  Continue home self directed exercise as tolerated  2. Clinical Health Psychologist to address issues of relaxation, behavioral change, coping style, and other factors important to improvement.  Deferred - coping well at this time  3. Diagnostic Studies:  Not indicated at this time  4. Medication Management:    1. Continue Gabapentin 900 mg TID  2. Continue Norco 5/325 - maximum 3 per day on bad days  3. Continue Flexeril as needed for muscle spasm and sleep  4. Continue Robaxin 750 mg TID prn spasm  5. Continue Zonegran for headache  5. Further procedures recommended:   1. Proceed with SCS trial once approved - Psychological eval is completed and she was found to be a good candidate  2. May repeat Trigger point injections for muscles spasm when needed  3. May repeat lumbar facet joint and SI joint injection if needed  6. Recommendations to PCP: continue current treatment  7. Follow up: for procedure and in 10-12 weeks    Since her last visit, Yusra Rivas reports:  She continues with back and leg pain.  She feels that the back pain is overall better.  She complains mostly of right leg pain that encompasses the entire right lower extremity.  The pain starts in the back and hip and goes into the groin and down the right lower extremity anteriorly into the foot.  The worst of the pain is in the hip and down the front of the thigh to the knee and when it gets really bad it will  travel below the knee.  She will also have pain down the back of the right leg as well.  She has occasional pain down the left lower extremity as well but no where near as bad as the right leg.     She underwent spinal cord stimulator trial and found that it helped with her back and leg pain until about 2 days before her lead pull with an aching and burning pain in the right lower back and buttock.  After the lead was pulled, she started having pain down both lower extremities with more pain in the left leg and back that she didn't really have before.  She then had an itching sensation in her back for a while after removing the leads as well.  An MRI lumbar spine was ordered at the time of her complaints of different pain during the trial but she did not complete the study stating that she started feeling better after the leads were pulled.    She states that she went through the side effects profile of Zonisemide and thought that she may have been having side effects from it, so she stopped this medication and the symptoms have been getting better since coming off it.    Pain scores:  Pain intensity on average is 8 on a scale of 0-10.  Ranges from 4/10 to 10/10.  Her pain is described as constant, aching, burning, shooting, throbbing, sharp, stabbing, penetrating, miserable, gnawing, nagging, tender, and tiring and worse with prolonged activity or positions and decreased with rest and medications.    THE 4 A's OF OPIOID MAINTENANCE ANALGESIA    Analgesia: yes    Activity: improved    Adverse effects: denies    Adherence to Rx protocol: yes    [place] Board of Pharmacy Data Base Reviewed:    YES; compliant    Current pain treatments:   Norco 5/325 one tab BID - occasionally three per day  Gabapentin 300 mg 3 capsules TID  Flexeril 10 mg at bedtime prn  Robaxin 750 mg TID - as needed    Past pain treatments:  Indomethacin - GI problems  Percocet - no different than hydrocodone  Relafen - made heart race  Norco  "5/325 one tab BID  Gabapentin 300 mg 3 capsules TID  Flexeril 10 mg at bedtime  Robaxin 750 mg TID  Zonegran 25 mg QHS - different side effects    Procedures:  -12/12/16 caudal epidural steroid injection  -8/28/17 right L5-S1 facet and right SI joint injection  -1/22/18 SCS trial    Side Effects: Multiple side effects from Zonegran - improving after discontinued    Medications:  Current Outpatient Prescriptions   Medication Sig Dispense Refill     Cyanocobalamin (VITAMIN B-12 PO) Take by mouth daily        MG capsule TAKE ONE CAPSULE BY MOUTH EVERY  capsule 3     gabapentin (NEURONTIN) 300 MG capsule Take 3 capsules (900 mg) by mouth 3 times daily 270 capsule 3     HYDROcodone-acetaminophen (NORCO) 5-325 MG per tablet Take 1 tablet by mouth every 6 hours as needed for pain Max of 3 tablets per day on bad days. This is a 30 day supply.  Dispense on/after 5/8/18.  To start on 5/10/18 75 tablet 0     metoprolol tartrate (LOPRESSOR) 50 MG tablet TAKE ONE TABLET BY MOUTH TWICE A  tablet 2     Multiple Vitamins-Minerals (I-GOLDIE) TABS TAKE ONE TABLET BY MOUTH EVERY DAY 60 tablet 8     multivitamin (I-GOLDIE) TABS per tablet Take 1 tablet by mouth daily 90 tablet 1     Pyridoxine HCl (VITAMIN B6 PO) Take by mouth daily       VITAMIN D3 1000 UNITS tablet TAKE TWO TABLETS BY MOUTH EVERY  tablet 3       Medical History: any changes in medical history since they were last seen? No    Review of Systems:  The 14 system ROS was reviewed from the intake questionnaire, and is positive for: fever, chills, fatigue, nosebleeds, sinus infection, earache, itching, rash, edema, palpitations, abdominal pain, diarrhea, constipation, joint pain, arthritis, back pain, stiffness, weakness, numbness, tingling  Any bowel or bladder problems: denies changes  Mood: good    Physical Exam:  /73  Pulse 76  Ht 1.702 m (5' 7\")  Wt 71.7 kg (158 lb)  BMI 24.75 kg/m2  Constitutional: alert and no distress.  Pt is well " nourished, well developed  Head: Normocephalic. No masses, lesions, tenderness or abnormalities  ENT: EOMI, mucosal surfaces moist.  Neck with full ROM, posture fair  Cardiovascular: No edema or JVD appreciated.  Respiratory: Good diaphragmatic excursion. No wheezes appreciated.  Speaking in complete sentences without shortness of breath.  No accessory muscle use.   Musculoskeletal: extremities normal- no gross deformities noted, normal muscle tone and able to move about the exam room without difficulty.    Skin: no suspicious lesions or rashes appreciated on exposed areas  Neurologic: Gait antalgic with hip pain gait favoring the right side. Moving all extremities spontaneously, no apparent weakness.    Psychiatric: mentation appears normal, affect full and good eye contact.      Cervical Spine:  Good range of motion, exam limited  Lumbar Spine:  Tender to palpation right lumbar paraspinal muscles and gluteal muscles, flexes 90 degrees with pulling, well healed thoracolumbar surgical scar, straight leg raise is positive on the right, negative on the left, ZAIRE causes hip pain on the right    Right hip joint is tender anteriorly, full range of motion without crepitus, pain reproduction with right hip internal/external rotation    Assessment:   1.  Chronic pain syndrome  2.  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  Diffuse myofascial pain (muscle pain)  4.  Muscle spasm/dystonia  5.  History of closed head injury  6.  Lumbar radiculopathy (radiating pain)  7.  Depression/anxiety  8.  Right foot drop - intermittent  9.  Proximal lower extremity weakness  10.  Chronic headache  11.  History of breast cancer, lung cancer  12.  Right hip and groin pain    Yusra Rivas is a 62 year old female who is seen at the pain clinic for chronic lower back pain with change in lower extremity symptoms on the right.  Much of the pain seems to be centered around the right hip and her gait has the appearance of a hip  pain gait.  She continues to have radicular symptoms worse on the right as well so radiculopathy is a consideration as well. She had some change in symptoms during and after the SCS trial but did not have the MRI lumbar spine completed as requested.  Since her pain seems to be focused at the hip on exam today, we will proceed with the plan as outlined below.     Plan:  1. Physical Therapy:  BENNY - physical therapy eval and treat  2. Clinical Health Psychologist to address issues of relaxation, behavioral change, coping style, and other factors important to improvement.  deferred  3. Diagnostic Studies:    1. Consider right hip MRI scan for hip pain  2. Consider lumbar spine CT vs MRI scan due to worsened right lower extremity pain  4. Medication Management:    1. Continue Norco 5/325 2-3 per day as needed  2. Continue Gabapentin 900 mg TID  3. Continue Flexeril or Robaxin as needed for muscle spasm  4. Discontinue Zonegran  5. Further procedures recommended:   1. Right hip joint injection  2. Consider right L2-3, L3-4 transforaminal epidural steroid injection  3. Hold on SCS implant at this time due to symptoms at time of lead pull  6. Recommendations to PCP: continue current treatment  7. Follow up: for hip injection and in 8-10 weeks    Total time spent was 30 minutes, and more than 50% of face to face time was spent in counseling and/or coordination of care regarding diagnosis, physical activity, medication management, and interventional procedures.    Antoine Narvaez MD  [facility]

## 2018-05-08 NOTE — TELEPHONE ENCOUNTER
STONEY for patient to schedule Right Hip Injection.      Ainsley Sky    [facility]

## 2018-05-11 LAB — PAIN DRUG SCR UR W RPTD MEDS: NORMAL

## 2018-05-11 NOTE — TELEPHONE ENCOUNTER
Pre-screening Questions for Radiology Injections:    Injection to be done at which interventional clinic site? [facility]   If [facility], instruct patient to arrive 30 minutes before the scheduled appointment time.     Procedure ordered by Dr. CUEVAS    Procedure ordered?  Hip Joint Injection    What insurance would patient like us to bill for this procedure? MEDICARE AND [employer]      Worker's comp or MVA (motor vehicle accident) -Any injection DO NOT SCHEDULE and route to Melida Olivarez.      [employer] - For SI joint injections, DO NOT SCHEDULE and route Lela Reveles. [employer] FREEDOM NO PA REQUIRED EFFECTIVE 11/1/2017      [employer]- MBB's must be scheduled at LEAST two weeks apart      [employer] - Any injection besides hip/shoulder/knee joint DO NOT SCHEDULE and route to Lela Reveles. She will obtain PA and call pt back to schedule procedure or notify pt of denial.        [employer]-Route to Lela for review    Any chance of pregnancy? NO   If YES, do NOT schedule and route to RN pool    Is an  needed? No     Patient has a drive home? (mandatory) YES:     Is patient taking any blood thinners (plavix, coumadin, jantoven, warfarin, heparin, pradaxa or dabigatran )? No   If hold needed, do NOT schedule, route to RN pool     Is patient taking any aspirin products? No     If more than 325mg/day do NOT schedule; route to RN pool     For CERVICAL procedures, hold all aspirin products for 6 days.      Does the patient have a bleeding or clotting disorder? No     If YES, okay to schedule AND route to RN nurse pool    **For any patients with platelet count <100, must be forwarded to provider**    Is patient diabetic?  No  If YES, have them bring their glucometer.    Does patient have an active infection or treated for one within the past week? No     Is patient currently taking any antibiotics?  No     For patients on chronic, preventative, or prophylactic antibiotics,  procedures may be scheduled.     For patients on antibiotics for active or recent infection:    Amparo Grullon Burton, Snitzer-antibiotic course must have been completed for 4 days    Is patient currently taking any steroid medications? (i.e. Prednisone, Medrol)  No     For patients on steroid medications:    Amparo Grullon Burton, Snitzer-steroid course must have been completed for 4 days    Reviewed with patient:  If you are started on any steroids or antibiotics between now and your appointment, you must contact us because it may affect our ability to perform your procedure.  Yes    Is patient actively being treated for cancer or immunocompromised? No  If YES, do NOT schedule and route to RN pool     Are you able to get on and off an exam table with minimal or no assistance? Yes  If NO, do NOT schedule and route to RN pool    Are you able to roll over and lay on your stomach with minimal or no assistance? Yes  If NO, do NOT schedule and route to RN pool     Any allergies to contrast dye, iodine, shellfish, or numbing and steroid medications? No  If YES, route to RN pool AND add allergy information to appointment notes    Allergies: Celexa [citalopram hydrobromide]; Cardizem cd; Naprosyn [naproxen]; and Relafen [nabumetone]      Has the patient had a flu shot or any other vaccinations within 7 days before or after the procedure.  No     Does patient have an MRI/CT?  Not Applicable  (SI joint, hip injections, lumbar sympathetic blocks, and stellate ganglion blocks do not require an MRI)    Was the MRI done w/in the last 3 years?  NA    Was MRI done at [place]? No      If not, where was it done? N/A       If MRI was not done at [place], [facility] or [facility] do NOT schedule and route to nursing.  If pt has an imaging disc, the injection may be scheduled but pt has to bring disc to appt. If they show up w/out disc the injection cannot be done    Reminders (please tell patient if  applicable):       Instructed pt to arrive 30 minutes early for IV start if this is for a cervical procedure, ALL sympathetic (stellate ganglion, hypogastric, or lumbar sympathetic block) and all sedation procedures (RFA, spinal cord stimulation trials).  Not Applicable   -IVs are not routinely placed for Dr. Escobar cervical cases   -Dr. Smith: IVs for cervical ESIs and cervical TBDs (not CMBBs/facet inj)      If NPO for sedation, informed patient that it is okay to take medications with sips of water (except if they are to hold blood thinners).  Not Applicable   *DO take blood pressure medication if it is prescribed*      If this is for a cervical ANDIE, informed patient that aspirin needs to be held for 6 days.   Not Applicable      For all patients not having spinal cord stimulator (SCS) trials or radiofrequency ablations (RFAs), informed patient:    IV sedation is not provided for this procedure.  If you feel that an oral anti-anxiety medication is needed, you can discuss this further with your referring provider or primary care provider.  The Pain Clinic provider will discuss specifics of what the procedure includes at your appointment.  Most procedures last 10-20 minutes.  We use numbing medications to help with any discomfort during the procedure.  NO      Do not schedule procedures requiring IV placement in the first appointment of the day or first appointment after lunch. Do NOT schedule at 0745, 0815 or 1245.       For patients 85 or older we recommend having an adult stay w/ them for the remainder of the day.       Does the patient have any questions?  NO  Melida Olivarez  [facility]

## 2018-05-15 ENCOUNTER — TRANSFERRED RECORDS (OUTPATIENT)
Dept: HEALTH INFORMATION MANAGEMENT | Facility: CLINIC | Age: 63
End: 2018-05-15

## 2018-05-29 ENCOUNTER — OFFICE VISIT (OUTPATIENT)
Dept: INTERNAL MEDICINE | Facility: CLINIC | Age: 63
End: 2018-05-29
Payer: MEDICARE

## 2018-05-29 VITALS
SYSTOLIC BLOOD PRESSURE: 120 MMHG | OXYGEN SATURATION: 97 % | TEMPERATURE: 97.9 F | WEIGHT: 160.7 LBS | HEART RATE: 73 BPM | DIASTOLIC BLOOD PRESSURE: 76 MMHG | BODY MASS INDEX: 25.17 KG/M2

## 2018-05-29 DIAGNOSIS — K08.89 PAIN, DENTAL: ICD-10-CM

## 2018-05-29 DIAGNOSIS — T78.40XD HYPERSENSITIVITY DISORDER, SUBSEQUENT ENCOUNTER: Primary | ICD-10-CM

## 2018-05-29 NOTE — PATIENT INSTRUCTIONS
[facility]         Internal Medicine Resident                   Continuity Clinic    Who We Are    Resident Continuity Clinic is a part of the [facility].  Resident physicians see patients independently and establish a relationship with them over the course of their three-year residency program.  As with the Primary Care Clinic, our Resident Continuity Clinic models a group practice.  If your doctor is not available, you will be able to see another resident physician.  At the end of a resident s training, patients will be transitioned to a new resident physician for ongoing care.     We treat patients with a wide array of medical needs from routine physicals, to acute illnesses, to diabetes and blood pressure management, to complex medical illness.  What is a Resident Physician?    Resident physicians hold medical degrees and are doctors. They are training to become specialists in Internal Medicine. They work under the supervision of board-certified faculty physicians.  Expectations for Your Care    We strive to provide accessible, quality care at all times.    In order to provide this care, it is best to see your primary care resident doctor consistently rather switching between providers.  In the event you do see another physician, you should schedule a follow-up visit with your usual primary care doctor.    If you are transitioning your care from another clinic, it is helpful to have your records available for your doctor to review.    We do not prescribe controlled substances, such as ADD medications or narcotic pain medications, on your first visit.  We will review your health records and concerns prior to devising a treatment plan with you in order to provide the best care.      Clinic Services     Extended clinic hours; patient  to help navigate your visit;  parking; laboratory and imaging services with evening and weekend hours    Multiple medical and  surgical specialties in one building    Complementary services, including Nutrition, Integrative Medicine, Pharmacy consultations, Mental and Behavioral Health, Sports Medicine and Physical Therapy    Thank You    We would like to thank you for choosing the [facility] Internal Medicine Resident Continuity Clinic for your primary care. You are making a priceless contribution to the training of the next generation of health care practitioners.     Contact us at [phone] for appointments or questions.    Resident Clinic Hours are Tuesdays and Thursdays, 7:30am-5:00pm    Residents  Hayley Evans MD   (Female )   Lela Briscoe MD   (Female)   Hayden Ibarra MD  (Male)   Patrick Worthington MD  (Male)   Carmen Ag MD   (Female)   Sumanth Herzog MD  (Male)    Heladio Rolle MD  (Male)   Margarito Daily MD  (Male)   Patrick Lenz MD (Male)   Mannie Jaramillo MD  (Male)   Alise Gage MD (Female)    Padma Palomo MD (Female)   Gunnar Coronel MD  (Male)   Jeanette Hedrick MD(Female)   Yolanda Lofton MD  (Female)    Supervising Physicians   MD Diane Springer MD Briar Duffy, MD James Langland, MD Mary Logeais, MD Tanya Melnik, MD Charles Moldow, MD Heather Thompson Buum, MD Kathleen Watson, MD

## 2018-05-29 NOTE — NURSING NOTE
Chief Complaint   Patient presents with     Swelling     Fluid retention in gums, neck and ears. Has been to 4 different dentisits which started shortly after Chemo finished, ingested cocconut [place] noted some time later cocconut flakes in mouth, sore throat, lime green phlegm.       Kath Gleason LPN at 2:51 PM on May 29, 2018

## 2018-05-29 NOTE — MR AVS SNAPSHOT
After Visit Summary   5/29/2018    Yusra Rivas    MRN: [id number]           Patient Information     Date Of Birth          1955        Visit Information        Provider Department      5/29/2018 3:00 PM Margarito Daily DO [facility]        Today's Diagnoses     Hypersensitivity disorder, subsequent encounter    -  1    Pain, dental          Care Instructions           [facility]         Internal Medicine Resident                   Continuity Clinic    Who We Are    Resident Continuity Clinic is a part of the [facility] Primary [facility].  Resident physicians see patients independently and establish a relationship with them over the course of their three-year residency program.  As with the Primary Care Clinic, our Resident Continuity Clinic models a group practice.  If your doctor is not available, you will be able to see another resident physician.  At the end of a resident s training, patients will be transitioned to a new resident physician for ongoing care.     We treat patients with a wide array of medical needs from routine physicals, to acute illnesses, to diabetes and blood pressure management, to complex medical illness.  What is a Resident Physician?    Resident physicians hold medical degrees and are doctors. They are training to become specialists in Internal Medicine. They work under the supervision of board-certified faculty physicians.  Expectations for Your Care    We strive to provide accessible, quality care at all times.    In order to provide this care, it is best to see your primary care resident doctor consistently rather switching between providers.  In the event you do see another physician, you should schedule a follow-up visit with your usual primary care doctor.    If you are transitioning your care from another clinic, it is helpful to have your records available for your doctor to review.    We do not prescribe controlled  substances, such as ADD medications or narcotic pain medications, on your first visit.  We will review your health records and concerns prior to devising a treatment plan with you in order to provide the best care.      Clinic Services     Extended clinic hours; patient  to help navigate your visit;  parking; laboratory and imaging services with evening and weekend hours    Multiple medical and surgical specialties in one building    Complementary services, including Nutrition, Integrative Medicine, Pharmacy consultations, Mental and Behavioral Health, Sports Medicine and Physical Therapy    Thank You    We would like to thank you for choosing the [facility] Internal Medicine Resident Continuity Clinic for your primary care. You are making a priceless contribution to the training of the next generation of health care practitioners.     Contact us at [phone] for appointments or questions.    Resident Clinic Hours are Tuesdays and Thursdays, 7:30am-5:00pm    Residents  Hayley Evans MD   (Female )   Lela Briscoe MD   (Female)   Hayden Ibarra MD  (Male)   Patrick Worthington MD  (Male)   Carmen Ag MD   (Female)   Sumanth Herzog MD  (Male)    Heladio Rolle MD  (Male)   Margarito Daily MD  (Male)   Patrick Lenz MD (Male)   Mannie Jaramillo MD  (Male)   Alise Gage MD (Female)    Padma Palomo MD (Female)   Gunnar Coronel MD  (Male)   Jeanette Hedrick MD(Female)   Yolanda Lofton MD  (Female)    Supervising Physicians   MD Diane Springer MD Briar Duffy, MD James Langland, MD Mary Logeais, MD Tanya Melnik, MD Charles Moldow, MD Heather Thompson Buum, MD Kathleen Watson, MD                    Follow-ups after your visit        Additional Services     PSYCHOLOGY REFERRAL       Your provider has referred you to:  PREFERRED PROVIDERS: Providers near [facility] who can do cognitive behavioral therapy and biofeedback  therapy    Please be aware that coverage of these services is subject to the terms and limitations of your health insurance plan.  Call member services at your health plan with any benefit or coverage questions.      Please bring the following to your appointment:    >>   Any x-rays, CTs or MRIs which have been performed.  Contact the facility where they were done to arrange for  prior to your scheduled appointment.   >>   List of current medications   >>   This referral request   >>   Any documents/labs given to you for this referral                  Your next 10 appointments already scheduled     Jun 04, 2018  1:15 PM CDT   Radiology Injections with Marychuy Christensen MD   [facility] ([facility])    [address] [id number] [phone]            Jul 23, 2018  2:30 PM CDT   Return Visit with Antoine Narvaez MD   [facility] ([facility])    [address] [id number] [phone]            Mar 19, 2019  2:30 PM CDT   Return Visit with Sandra Arroyo MD   [facility] ([facility])    [address] [place] [id number] [phone]              Who to contact     Please call your clinic at [phone] to:    Ask questions about your health    Make or cancel appointments    Discuss your medicines    Learn about your test results    Speak to your doctor            Additional Information About Your Visit        MyChart Information     [employer] is an electronic gateway that provides easy, online access to your medical records. With [employer], you can request a clinic appointment, read your test results, renew a prescription or communicate with your care team.     To sign up for [employer] visit the website at [email or web address]   You will be asked to enter the access code listed below, as well as some personal information. Please  follow the directions to create your username and password.     Your access code is: [email or web address]  Expires: 2018  2:44 PM     Your access code will  in 90 days. If you need help or a new code, please contact your [facility] or call [phone] for assistance.        Care EveryWhere ID     This is your Care EveryWhere ID. This could be used by other organizations to access your [facility] medical records  [id number]        Your Vitals Were     Pulse Temperature Pulse Oximetry BMI (Body Mass Index)          73 97.9  F (36.6  C) (Oral) 97% 25.17 kg/m2         Blood Pressure from Last 3 Encounters:   18 120/76   18 112/73   18 126/67    Weight from Last 3 Encounters:   18 72.9 kg (160 lb 11.2 oz)   18 71.7 kg (158 lb)   18 70.8 kg (156 lb)              We Performed the Following     PSYCHOLOGY REFERRAL        Primary Care Provider Office Phone # Fax #    Munir Kamaljit Lynn,  [phone]       [address] [place] [address]        Equal Access to Services     ROSEMARY EDUARDO AH: Hadii jeff ku hadasho Soomaali, waaxda luqadaha, qaybta kaalmada adeegyada, trisha wilson. So [facility] [phone].    ATENCIÓN: Si habla español, tiene a ruiz disposición servicios gratuitos de asistencia lingüística. Llame al [phone].    We comply with applicable federal civil rights laws and [place] laws. We do not discriminate on the basis of race, color, national origin, age, disability, sex, sexual orientation, or gender identity.            Thank you!     Thank you for choosing [facility]  for your care. Our goal is always to provide you with excellent care. Hearing back from our patients is one way we can continue to improve our services. Please take a few minutes to complete the written survey that you may receive in the mail after your visit with us. Thank you!             Your Updated  Medication List - Protect others around you: Learn how to safely use, store and throw away your medicines at www.disposemymeds.org.          This list is accurate as of 5/29/18  4:43 PM.  Always use your most recent med list.                   Brand Name Dispense Instructions for use Diagnosis    cholecalciferol 1000 UNIT tablet    vitamin D3    200 tablet    TAKE TWO TABLETS BY MOUTH EVERY DAY    Routine general medical examination at a health care facility        MG capsule   Generic drug:  docusate sodium     100 capsule    TAKE ONE CAPSULE BY MOUTH EVERY DAY    Slow transit constipation       gabapentin 300 MG capsule    NEURONTIN    270 capsule    Take 3 capsules (900 mg) by mouth 3 times daily    Encounter for other specified aftercare       HYDROcodone-acetaminophen 5-325 MG per tablet    NORCO    75 tablet    Take 1 tablet by mouth every 6 hours as needed for pain Max of 3 tablets per day on bad days. This is a 30 day supply.  Dispense on/after 5/8/18.  To start on 5/10/18    Spasm, Neck pain       metoprolol tartrate 50 MG tablet    LOPRESSOR    180 tablet    TAKE ONE TABLET BY MOUTH TWICE A DAY    Hypertension goal BP (blood pressure) < 130/80       * multivitamin Tabs per tablet     60 tablet    TAKE ONE TABLET BY MOUTH EVERY DAY    Routine history and physical examination of adult       * multivitamin Tabs per tablet     90 tablet    Take 1 tablet by mouth daily    Vitamin deficiency       VITAMIN B-12 PO      Take by mouth daily        VITAMIN B6 PO      Take by mouth daily        * Notice:  This list has 2 medication(s) that are the same as other medications prescribed for you. Read the directions carefully, and ask your doctor or other care provider to review them with you.
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
## 2018-05-30 DIAGNOSIS — M54.2 NECK PAIN: ICD-10-CM

## 2018-05-30 DIAGNOSIS — R25.2 SPASM: ICD-10-CM

## 2018-05-30 NOTE — TELEPHONE ENCOUNTER
VM left today at: 11: 43 am        Reason for call:  Medication   If this is a refill request, has the caller requested the refill from the pharmacy already? N/A  Will the patient be using a [facility]? Yes  Name of the pharmacy and phone number for the current request: [facility], [place] - [address]       Name of the medication requested: HYDROcodone-acetaminophen (NORCO) 5-325 MG per tablet      Other request: Pt would like rx sent to [facility] pharmacy, pt would like a call that we have received her message.          Phone number to reach patient:  Home number on file [phone] (home        Melida ROCA    [facility]

## 2018-05-30 NOTE — TELEPHONE ENCOUNTER
Medication refill information reviewed.     Last due:  Dispense on/after 5/8/18.  To start on 5/10/18. But filled on 5/15/18    Due date:  6/14/18    Weaning instructions:  N/A    Prescriptions prepped for review.     Angélica Benites, RN-BSN  [facility]

## 2018-05-30 NOTE — TELEPHONE ENCOUNTER
Received call from patient requesting refill(s) of HYDROcodone-acetaminophen (NORCO) 5-325 MG per tablet    Last picked up from pharmacy on 05/15/18    Pt last seen by prescribing provider on 05/08/18  Next appt scheduled for 07/23/18 - does have injection date scheduled prior     checked in the past 6 months? Yes If no, print current report and give to RN    Last urine drug screen date 05/08/18  Current opioid agreement on file (completed within the last year) Yes Date of opioid agreement: 12/21/16    Processing (pick one and delete the others):      Mail to [place] [facility]   [address] [place] [facility] [address]    Will route to nursing pool for review and preparation of prescription(s).

## 2018-05-31 RX ORDER — HYDROCODONE BITARTRATE AND ACETAMINOPHEN 5; 325 MG/1; MG/1
1 TABLET ORAL EVERY 6 HOURS PRN
Qty: 75 TABLET | Refills: 0 | Status: SHIPPED | OUTPATIENT
Start: 2018-05-31 | End: 2018-07-09

## 2018-05-31 NOTE — TELEPHONE ENCOUNTER
Signed Prescriptions:                        Disp   Refills    HYDROcodone-acetaminophen (NORCO) 5-325 MG*75 tab*0        Sig: Take 1 tablet by mouth every 6 hours as needed for           pain Max of 3 tablets per day on bad days. This           is a 30 day supply.  Dispense on/after 6/12/18.            To start on 6/14/18  Authorizing Provider: ANTOINE SU    Reviewed, printed, signed in [place].  Given to MA for processing.    Antoine Narvaez MD  [facility]

## 2018-06-03 NOTE — PROGRESS NOTES
Pre procedure Diagnosis: hip arthropathy   Post procedure Diagnosis: Same  Procedure performed: RIGHT hip injection  Anesthesia: none  Complications: none  Operators: Shamika Christensen MD, Layne Blank MD    Indications:   Yusra Rivas is a 63 year old female was sent by Dr. Sharri Narvaez for a right hip injection.  They have a history of right hip pain.  Exam shows +ZAIRE and they have tried conservative treatment including PT and medications.    Options/alternatives, benefits and risks were discussed with the patient including bleeding, infection, tissue trauma, exposure to radiation, reaction to medications including seizure, nerve injury, weakness, and numbness.  Questions were answered to her satisfaction and she agrees to proceed. Voluntary informed consent was obtained and signed.     Vitals were reviewed: Yes  Allergies were reviewed:  Yes   Medications were reviewed:  Yes   Pre-procedure pain score: 8/10    Procedure:  After getting informed consent, patient was brought into the procedure suite and was placed in a prone position on the procedure table.   A Pause for the Cause was performed.  Patient was prepped and draped in sterile fashion.     After identifying the right hip joint, a total of 2 ml of Lidocaine 1% was used to anesthetize the skin at a skin entry site. Care was taken to locate the femoral pulse, and a lateral approach was taken for the injection. A 22 gauge 5 inch spinal needle was advanced under intermittent fluoroscopy until it was felt to enter the Hip joint   A total of 0.5 ml of Omnipaque-300 was injected, showing appropriate location, with spread into the intraarticular space and no intravascular uptake noted. 9.5 ml was wasted.  A mixture containing, 1.5 ml of 0.2% ropivicaine with 40 mg of kenalog was injected. The needle was removed.     Hemostasis was achieved, the area was cleaned, and bandaids were placed when appropriate.  The patient tolerated the procedure well, and was taken  to the recovery room.    Images were saved to PACS.    Post-procedure pain score: 4/10    Follow-up includes:   -f/u phone call in one week  -f/u with referring provider    Shamika Christensen MD  [facility]

## 2018-06-04 ENCOUNTER — RADIOLOGY INJECTION OFFICE VISIT (OUTPATIENT)
Dept: PALLIATIVE MEDICINE | Facility: CLINIC | Age: 63
End: 2018-06-04
Attending: ANESTHESIOLOGY
Payer: MEDICARE

## 2018-06-04 ENCOUNTER — RADIANT APPOINTMENT (OUTPATIENT)
Dept: RADIOLOGY | Facility: CLINIC | Age: 63
End: 2018-06-04
Attending: PSYCHIATRY & NEUROLOGY
Payer: MEDICARE

## 2018-06-04 VITALS — HEART RATE: 70 BPM | SYSTOLIC BLOOD PRESSURE: 109 MMHG | OXYGEN SATURATION: 99 % | DIASTOLIC BLOOD PRESSURE: 56 MMHG

## 2018-06-04 DIAGNOSIS — M14.851: Primary | ICD-10-CM

## 2018-06-04 DIAGNOSIS — M16.11 PRIMARY OSTEOARTHRITIS OF RIGHT HIP: ICD-10-CM

## 2018-06-04 PROCEDURE — 77002 NEEDLE LOCALIZATION BY XRAY: CPT | Performed by: PSYCHIATRY & NEUROLOGY

## 2018-06-04 PROCEDURE — 20610 DRAIN/INJ JOINT/BURSA W/O US: CPT | Mod: RT | Performed by: PSYCHIATRY & NEUROLOGY

## 2018-06-04 ASSESSMENT — PAIN SCALES - GENERAL
PAINLEVEL: EXTREME PAIN (8)
PAINLEVEL: MODERATE PAIN (4)

## 2018-06-04 NOTE — PATIENT INSTRUCTIONS
[facility]   Procedure Discharge Instructions    Today you saw: Dr. Marychuy Christensen     You had an:    hip injection       Medications used:  Lidocaine    Omnipaque  Ropivicaine   Kenalog            Be cautious when walking. Numbness and/or weakness in the lower extremities may occur for up to 6-8 hours after the procedure due to effect of the local anesthetic    Do not drive for 6 hours. The effect of the local anesthetic could slow your reflexes.     You may resume your regular activities after 24 hours    Avoid strenuous activity for the first 24 hours    You may shower, however avoid swimming, tub baths or hot tubs for 24 hours following your procedure    You may have a mild to moderate increase in pain for several days following the injection.    It may take up to 14 days for the steroid medication to start working although you may feel the effect as early as a few days after the procedure.       You may use ice packs for 10-15 minutes, 3 to 4 times a day at the injection site for comfort    Do not use heat to painful areas for 6 to 8 hours. This will give the local anesthetic time to wear off and prevent you from accidentally burning your skin.     You may use anti-inflammatory medications (such as Ibuprofen or Aleve or Advil) or Tylenol for pain control if necessary    If you were fasting, you may resume your normal diet and medications after the procedure    If you have diabetes, check your blood sugar more frequently than usual as your blood sugar may be higher than normal for 10-14 days following a steroid injection. Contact your doctor who manages your diabetes if your blood sugar is higher than usual    If you experience any of the following, call the Pain Clinic during work hours at [phone] or the Provider Line after hours at [phone]:  -Fever over 100 degree F  -Swelling, bleeding, redness, drainage, warmth at the injection site  -Progressive weakness or numbness in your  legs or arms  -Loss of bowel or bladder function  -Unusual headache that is not relieved by Tylenol or other pain reliever  -Unusual new onset of pain that is not improving

## 2018-06-04 NOTE — MR AVS SNAPSHOT
After Visit Summary   6/4/2018    Yusra Rivas    MRN: [id number]           Patient Information     Date Of Birth          1955        Visit Information        Provider Department      6/4/2018 1:15 PM Marychuy Christensen MD [facility] [place]        Care Instructions    [facility]   Procedure Discharge Instructions    Today you saw: Dr. Marychuy Christensen     You had an:    hip injection       Medications used:  Lidocaine    Omnipaque  Ropivicaine   Kenalog            Be cautious when walking. Numbness and/or weakness in the lower extremities may occur for up to 6-8 hours after the procedure due to effect of the local anesthetic    Do not drive for 6 hours. The effect of the local anesthetic could slow your reflexes.     You may resume your regular activities after 24 hours    Avoid strenuous activity for the first 24 hours    You may shower, however avoid swimming, tub baths or hot tubs for 24 hours following your procedure    You may have a mild to moderate increase in pain for several days following the injection.    It may take up to 14 days for the steroid medication to start working although you may feel the effect as early as a few days after the procedure.       You may use ice packs for 10-15 minutes, 3 to 4 times a day at the injection site for comfort    Do not use heat to painful areas for 6 to 8 hours. This will give the local anesthetic time to wear off and prevent you from accidentally burning your skin.     You may use anti-inflammatory medications (such as Ibuprofen or Aleve or Advil) or Tylenol for pain control if necessary    If you were fasting, you may resume your normal diet and medications after the procedure    If you have diabetes, check your blood sugar more frequently than usual as your blood sugar may be higher than normal for 10-14 days following a steroid injection. Contact your doctor who manages your diabetes if your blood sugar is  higher than usual    If you experience any of the following, call the Pain Clinic during work hours at [phone] or the Provider Line after hours at [phone]:  -Fever over 100 degree F  -Swelling, bleeding, redness, drainage, warmth at the injection site  -Progressive weakness or numbness in your legs or arms  -Loss of bowel or bladder function  -Unusual headache that is not relieved by Tylenol or other pain reliever  -Unusual new onset of pain that is not improving                Follow-ups after your visit        Your next 10 appointments already scheduled     Jul 23, 2018  2:30 PM CDT   Return Visit with Antoine Narvaez MD   [facility] ([facility])    [address] [address] [phone]            Mar 19, 2019  2:30 PM CDT   Return Visit with Sandra Arroyo MD   [facility] ([facility])    [address] [address] [phone]              Who to contact     If you have questions or need follow up information about today's clinic visit or your schedule please contact [facility] directly at [phone].  Normal or non-critical lab and imaging results will be communicated to you by MyChart, letter or phone within 4 business days after the clinic has received the results. If you do not hear from us within 7 days, please contact the clinic through MyChart or phone. If you have a critical or abnormal lab result, we will notify you by phone as soon as possible.  Submit refill requests through [facility] or call your pharmacy and they will forward the refill request to us. Please allow 3 business days for your refill to be completed.          Additional Information About Your Visit        Care EveryWhere ID     This is your Care EveryWhere ID. This could be used by other organizations to access your [place] medical records  [id number]        Your Vitals Were     Pulse                    75            Blood Pressure from Last 3 Encounters:   06/04/18 118/72   05/29/18 120/76   05/08/18 112/73    Weight from Last 3 Encounters:   05/29/18 72.9 kg (160 lb 11.2 oz)   05/08/18 71.7 kg (158 lb)   04/26/18 70.8 kg (156 lb)              Today, you had the following     No orders found for display       Primary Care Provider Office Phone # Fax #    Munirbrunilda Lynn,  [phone]       [address] [place] [address]        Equal Access to Services     [facility] : Hadii jeff serrano hadleena Soguy, waaxda luqadaha, qaybta kaalmada cruzito, trisha price . So [facility] [phone].    ATENCIÓN: Si habla español, tiene a ruiz disposición servicios gratuitos de asistencia lingüística. [facility] [phone].    We comply with applicable federal civil rights laws and [place] laws. We do not discriminate on the basis of race, color, national origin, age, disability, sex, sexual orientation, or gender identity.            Thank you!     Thank you for choosing [facility]  for your care. Our goal is always to provide you with excellent care. Hearing back from our patients is one way we can continue to improve our services. Please take a few minutes to complete the written survey that you may receive in the mail after your visit with us. Thank you!             Your Updated Medication List - Protect others around you: Learn how to safely use, store and throw away your medicines at www.disposemymeds.org.          This list is accurate as of 6/4/18  1:38 PM.  Always use your most recent med list.                   Brand Name Dispense Instructions for use Diagnosis    cholecalciferol 1000 UNIT tablet    vitamin D3    200 tablet    TAKE TWO TABLETS BY MOUTH EVERY DAY    Routine general medical examination at a health care facility        MG capsule   Generic drug:  docusate sodium     100 capsule    TAKE ONE CAPSULE BY MOUTH EVERY DAY     Slow transit constipation       gabapentin 300 MG capsule    NEURONTIN    270 capsule    Take 3 capsules (900 mg) by mouth 3 times daily    Encounter for other specified aftercare       HYDROcodone-acetaminophen 5-325 MG per tablet    NORCO    75 tablet    Take 1 tablet by mouth every 6 hours as needed for pain Max of 3 tablets per day on bad days. This is a 30 day supply.  Dispense on/after 6/12/18.  To start on 6/14/18    Spasm, Neck pain       metoprolol tartrate 50 MG tablet    LOPRESSOR    180 tablet    TAKE ONE TABLET BY MOUTH TWICE A DAY    Hypertension goal BP (blood pressure) < 130/80       * multivitamin Tabs per tablet     60 tablet    TAKE ONE TABLET BY MOUTH EVERY DAY    Routine history and physical examination of adult       * multivitamin Tabs per tablet     90 tablet    Take 1 tablet by mouth daily    Vitamin deficiency       VITAMIN B-12 PO      Take by mouth daily        VITAMIN B6 PO      Take by mouth daily        * Notice:  This list has 2 medication(s) that are the same as other medications prescribed for you. Read the directions carefully, and ask your doctor or other care provider to review them with you.

## 2018-06-04 NOTE — NURSING NOTE
Pre-procedure Intake    Have you been fasting? NA    If yes, for how long? No     Are you taking a prescribed blood thinner such as coumadin, Plavix, Xarelto?    No    If yes, when did you take your last dose? No     Do you take aspirin?  No    If cervical procedure, have you held aspirin for 6 days?   NA    Do you have any allergies to contrast dye, iodine, steroid and/or numbing medications?  NO    Are you currently taking antibiotics or have an active infection?  NO    Have you had a fever/elevated temperature within the past week? NO    Are you currently taking oral steroids? NO    Do you have a ? Yes       Are you pregnant or breastfeeding?  NO    Are the vital signs normal?  Yes    Chauncey Flores MA

## 2018-06-14 DIAGNOSIS — K59.01 SLOW TRANSIT CONSTIPATION: ICD-10-CM

## 2018-06-14 RX ORDER — POLYETHYLENE GLYCOL 3350 17 G/17G
POWDER, FOR SOLUTION ORAL
Qty: 527 G | Refills: 0 | Status: SHIPPED | OUTPATIENT
Start: 2018-06-14

## 2018-06-14 NOTE — TELEPHONE ENCOUNTER
Routing refill request to provider for review/approval because:  Drug not active on patient's medication list    Gabi Navarro RN  [facility]

## 2018-06-14 NOTE — TELEPHONE ENCOUNTER
"Requested Prescriptions   Pending Prescriptions Disp Refills     polyethylene glycol (MIRALAX/GLYCOLAX) powder [Pharmacy Med Name: POLYETHYLENE GLYCOL 3350  POWD] 527 g 5    Last Written Prescription Date:  1/2/17  Last Fill Quantity: 527,  # refills: 5   Last office visit: No previous visit found with prescribing provider:  12/5/17   Future Office Visit:   Next 5 appointments (look out 90 days)     Jul 23, 2018  2:30 PM CDT   Return Visit with Antoine Narvaez MD   [facility] [place] [facility]    [address]  [place] [address]   [phone]                  Sig: STIR 17 GM OF POWDER (SEE DEXTER INSIDE CAP) IN 8-OZ OF LIQUID UNTIL COMPLETELY DISSOLVED. DRINK THE SOLUTION DAILY OR AS DIRECTED.    Laxatives Protocol Passed    6/14/2018  8:37 AM       Passed - Patient is age 6 or older       Passed - Recent (12 mo) or future (30 days) visit within the authorizing provider's specialty    Patient had office visit in the last 12 months or has a visit in the next 30 days with authorizing provider or within the authorizing provider's specialty.  See \"Patient Info\" tab in inbasket, or \"Choose Columns\" in Meds & Orders section of the refill encounter.              "

## 2018-06-18 ENCOUNTER — HOSPITAL ENCOUNTER (OUTPATIENT)
Dept: PHYSICAL THERAPY | Facility: OTHER | Age: 63
Setting detail: THERAPIES SERIES
End: 2018-06-18
Attending: ANESTHESIOLOGY
Payer: MEDICARE

## 2018-06-18 PROCEDURE — G8978 MOBILITY CURRENT STATUS: HCPCS | Mod: CK | Performed by: PHYSICAL THERAPIST

## 2018-06-18 PROCEDURE — 40000718 ZZHC STATISTIC PT DEPARTMENT ORTHO VISIT: Performed by: PHYSICAL THERAPIST

## 2018-06-18 PROCEDURE — 97161 PT EVAL LOW COMPLEX 20 MIN: CPT | Mod: GP | Performed by: PHYSICAL THERAPIST

## 2018-06-18 PROCEDURE — 97110 THERAPEUTIC EXERCISES: CPT | Mod: GP | Performed by: PHYSICAL THERAPIST

## 2018-06-18 PROCEDURE — G8979 MOBILITY GOAL STATUS: HCPCS | Mod: CJ | Performed by: PHYSICAL THERAPIST

## 2018-06-18 NOTE — PROGRESS NOTES
[facility]          OUTPATIENT PHYSICAL THERAPY ORTHOPEDIC EVALUATION  PLAN OF TREATMENT FOR OUTPATIENT REHABILITATION  (COMPLETE FOR INITIAL CLAIMS ONLY)  Patient's Last Name, First Name, M.I.  YOB: 1955  Yusra Rivas    Provider s Name:  [facility]   Medical Record No.  [id number]   Start of Care Date:  06/18/18   Onset Date:  01/18/11   Type:     _X__PT   ___OT   ___SLP Medical Diagnosis:  primary osteoarthritis of right hip, M16.11     PT Diagnosis:  right hip pain   Visits from SOC:  1      _________________________________________________________________________________  Plan of Treatment/Functional Goals:  stretching, strengthening, ROM, joint mobilization, manual therapy, gait training, balance training     Ultrasound, Cryotherapy, Hot packs, TENS, Electrical stimulation     Goals  Goal Identifier: 1  Goal Description: Patient will be able to walk with upright posture 90% of time in order to take her dogs for a walk.  Target Date: 08/04/18    Goal Identifier: 2  Goal Description: Patient will report a decrease in right hip pain from 2-10/10 to 0-5/10 in order to perform housework.  Target Date: 08/04/18       Therapy Frequency:     Predicted Duration of Therapy Intervention:  2x per week for 6 weeks    Ratna Foreman, PT                 I CERTIFY THE NEED FOR THESE SERVICES FURNISHED UNDER        THIS PLAN OF TREATMENT AND WHILE UNDER MY CARE     (Physician co-signature of this document indicates review and certification of the therapy plan).                       Certification Date From:  06/18/18   Certification Date To:  08/04/18    Referring Provider:  Antoine Narvaez MD    Initial Assessment        See Epic Evaluation Start of Care Date: 06/18/18

## 2018-06-19 DIAGNOSIS — Z51.89 ENCOUNTER FOR OTHER SPECIFIED AFTERCARE: ICD-10-CM

## 2018-06-19 RX ORDER — GABAPENTIN 300 MG/1
900 CAPSULE ORAL 3 TIMES DAILY
Qty: 270 CAPSULE | Refills: 3 | Status: SHIPPED | OUTPATIENT
Start: 2018-06-19 | End: 2018-10-29

## 2018-06-19 NOTE — TELEPHONE ENCOUNTER
Fax received from: [facility] Refill authorization requested    Drug: gabapentin (NEURONTIN) 300 MG capsule  Qty: 270  Last filled 05/14/18  Last seen: 5/8/18  Next appointment: 7/23/18      Chauncey Flores MA
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
## 2018-06-19 NOTE — TELEPHONE ENCOUNTER
Signed Prescriptions:                        Disp   Refills    gabapentin (NEURONTIN) 300 MG capsule      270 ca*3        Sig: Take 3 capsules (900 mg) by mouth 3 times daily  Authorizing Provider: ANTOINE SU    Reviewed, signed, e-prescribed.    Antoine Narvaez MD  [facility]

## 2018-06-20 NOTE — TELEPHONE ENCOUNTER
Patient called pharmacy last Monday 6/11 to request for gabapentin, the request just went through to us yesterday and she is now out of medication. She states that this is the second time this has happened. Please call ASAP. She is not sure where the mix up is, she states that the recording for the nurse line is not working.         Lela PALACIOS    [facility]

## 2018-06-20 NOTE — TELEPHONE ENCOUNTER
The gabapentin has not been signed and sent to her [facility].  Called pt and relayed the above.    Angélica Benites RN-BSN  [facility]

## 2018-07-06 DIAGNOSIS — R25.2 SPASM: ICD-10-CM

## 2018-07-06 DIAGNOSIS — M54.2 NECK PAIN: ICD-10-CM

## 2018-07-06 NOTE — TELEPHONE ENCOUNTER
Patient left  11:46 am    Rx- Hydrocodone mail to  pharmacy        Lela PALACIOS    [facility]

## 2018-07-09 RX ORDER — HYDROCODONE BITARTRATE AND ACETAMINOPHEN 5; 325 MG/1; MG/1
1 TABLET ORAL EVERY 6 HOURS PRN
Qty: 75 TABLET | Refills: 0 | Status: SHIPPED | OUTPATIENT
Start: 2018-07-09 | End: 2018-08-07

## 2018-07-09 NOTE — TELEPHONE ENCOUNTER
Signed Prescriptions:                        Disp   Refills    HYDROcodone-acetaminophen (NORCO) 5-325 MG*75 tab*0        Sig: Take 1 tablet by mouth every 6 hours as needed for           pain Max of 3 tablets per day on bad days. This           is a 30 day supply.  Dispense on/after 7/12/18.            To start on 7/14/18  Authorizing Provider: ANTOINE SU    Reviewed, printed, signed in [place].  Placed in MA basket for processing.    Antoine Narvaez MD  [facility]

## 2018-07-09 NOTE — TELEPHONE ENCOUNTER
Patient was notified. Signed Rx mailed out to [facility] -   [address] [place] [id number]    Chauncey Flores MA

## 2018-07-09 NOTE — TELEPHONE ENCOUNTER
Medication refill information reviewed.     Last due:  Dispense on/after 6/12/18.  To start on 6/14/18    Due date:  7/14/18    Weaning instructions:  N/A    Prescriptions prepped for review.     Angélica Benites RN-BSN  [facility]

## 2018-07-09 NOTE — TELEPHONE ENCOUNTER
Received call from patient requesting refill(s) of HYDROcodone-acetaminophen (NORCO) 5-325 MG per tablet    Last picked up from pharmacy on 06/14/18    Pt last seen by prescribing provider on 05/18/18- has had injection since  Next appt scheduled for 07/23/18     checked in the past 6 months? Yes If no, print current report and give to RN    Last urine drug screen date 05/08/18  Current opioid agreement on file (completed within the last year) No Date of opioid agreement: 12/27/16    Processing (pick one and delete the others):      Mail to [place] pharmacy   [address] [facility] [address]    Will route to nursing pool for review and preparation of prescription(s).

## 2018-07-23 ENCOUNTER — OFFICE VISIT (OUTPATIENT)
Dept: PALLIATIVE MEDICINE | Facility: CLINIC | Age: 63
End: 2018-07-23
Payer: MEDICARE

## 2018-07-23 VITALS
HEART RATE: 77 BPM | BODY MASS INDEX: 25.53 KG/M2 | SYSTOLIC BLOOD PRESSURE: 134 MMHG | DIASTOLIC BLOOD PRESSURE: 73 MMHG | WEIGHT: 163 LBS

## 2018-07-23 DIAGNOSIS — Z98.1 HISTORY OF FUSION OF LUMBAR SPINE: ICD-10-CM

## 2018-07-23 DIAGNOSIS — F41.1 GENERALIZED ANXIETY DISORDER: ICD-10-CM

## 2018-07-23 DIAGNOSIS — G62.0 POLYNEUROPATHY DUE TO DRUGS (H): ICD-10-CM

## 2018-07-23 DIAGNOSIS — M79.18 MYOFASCIAL PAIN: ICD-10-CM

## 2018-07-23 DIAGNOSIS — M54.16 LUMBAR RADICULOPATHY: Primary | ICD-10-CM

## 2018-07-23 DIAGNOSIS — G89.29 CHRONIC RIGHT HIP PAIN: ICD-10-CM

## 2018-07-23 DIAGNOSIS — M25.551 CHRONIC RIGHT HIP PAIN: ICD-10-CM

## 2018-07-23 DIAGNOSIS — G89.4 CHRONIC PAIN DISORDER: ICD-10-CM

## 2018-07-23 PROCEDURE — 99214 OFFICE O/P EST MOD 30 MIN: CPT | Performed by: ANESTHESIOLOGY

## 2018-07-23 ASSESSMENT — PAIN SCALES - GENERAL: PAINLEVEL: SEVERE PAIN (7)

## 2018-07-23 NOTE — PROGRESS NOTES
[facility]    Date of visit: 7/23/2018    Chief complaint:   Chief Complaint   Patient presents with     Pain       Interval history:  Yusra Rivas was last seen by me on 5/8/18.      Recommendations/plan at the last visit included:  Plan:  1. Physical Therapy:  [facility] - physical therapy eval and treat  2. Clinical Health Psychologist to address issues of relaxation, behavioral change, coping style, and other factors important to improvement.  deferred  3. Diagnostic Studies:    1. Consider right hip MRI scan for hip pain  2. Consider lumbar spine CT vs MRI scan due to worsened right lower extremity pain  4. Medication Management:    1. Continue Norco 5/325 2-3 per day as needed  2. Continue Gabapentin 900 mg TID  3. Continue Flexeril or Robaxin as needed for muscle spasm  4. Discontinue Zonegran  5. Further procedures recommended:   1. Right hip joint injection  2. Consider right L2-3, L3-4 transforaminal epidural steroid injection  3. Hold on SCS implant at this time due to symptoms at time of lead pull  6. Recommendations to PCP: continue current treatment  7. Follow up: for hip injection and in 8-10 weeks    Since her last visit, Yusra Rivas reports:  She continues with right lower back pain with pain into the right hip and lower extremity.  She had a hip injection by Dr Christensen on 6/4/18 that helped for the first few days after the injection.  She states that her pain worsened about a week and a half after the injection but then it improved again.  Her pain will travel down the LE into the front of the lower leg and now and then into the foot.  She has numbness and tingling down the right LE and in the left foot.  She feels that the right leg feels weak on and off and it will give out on her occasionally.    She started physical therapy but had to stop because she is taking care of her dog that was attacked by the neighbors dogs.    Her pain is described as constant, aching, burning,  shooting, throbbing, sharp, exhausting, penetrating, tender, gnawing, and unbearable.  Her pain is worsened with daily activities and better with medications and rest.    Pain scores:  Pain intensity on average is 5 on a scale of 0-10.  Ranges from 2/10 to 7/10.    THE 4 A's OF OPIOID MAINTENANCE ANALGESIA    Analgesia: yes    Activity: improved    Adverse effects: denies    Adherence to Rx protocol: yes    [place] Board of Pharmacy Data Base Reviewed:    YES; compliant    Current pain treatments:   Norco 5/325 one tab BID - occasionally three per day  Gabapentin 300 mg 3 capsules TID  Flexeril 10 mg at bedtime prn  Robaxin 750 mg TID - as needed    Past pain treatments:  Indomethacin - GI problems  Percocet - no different than hydrocodone  Relafen - made heart race  Norco 5/325 one tab BID  Gabapentin 300 mg 3 capsules TID  Flexeril 10 mg at bedtime  Robaxin 750 mg TID  Zonegran 25 mg QHS - different side effects     Procedures:  -12/12/16 caudal epidural steroid injection  -8/28/17 right L5-S1 facet and right SI joint injection  -1/22/18 SCS trial  -6/4/18 right hip injection (Dr Christensen)    Date of last UDS:  5/8/18    Side Effects: no side effect    Medications:  Current Outpatient Prescriptions   Medication Sig Dispense Refill     Cyanocobalamin (VITAMIN B-12 PO) Take by mouth daily       gabapentin (NEURONTIN) 300 MG capsule Take 3 capsules (900 mg) by mouth 3 times daily 270 capsule 3     HYDROcodone-acetaminophen (NORCO) 5-325 MG per tablet Take 1 tablet by mouth every 6 hours as needed for pain Max of 3 tablets per day on bad days. This is a 30 day supply.  Dispense on/after 7/12/18.  To start on 7/14/18 75 tablet 0     metoprolol tartrate (LOPRESSOR) 50 MG tablet TAKE ONE TABLET BY MOUTH TWICE A  tablet 2     Multiple Vitamins-Minerals (I-GOLDIE) TABS TAKE ONE TABLET BY MOUTH EVERY DAY 60 tablet 8     multivitamin (I-GOLDIE) TABS per tablet Take 1 tablet by mouth daily 90 tablet 1     polyethylene  glycol (MIRALAX/GLYCOLAX) powder STIR 17 GM OF POWDER (SEE DEXTER INSIDE CAP) IN 8-OZ OF LIQUID UNTIL COMPLETELY DISSOLVED. DRINK THE SOLUTION DAILY OR AS DIRECTED. 527 g 0     Pyridoxine HCl (VITAMIN B6 PO) Take by mouth daily       VITAMIN D3 1000 UNITS tablet TAKE TWO TABLETS BY MOUTH EVERY  tablet 3      MG capsule TAKE ONE CAPSULE BY MOUTH EVERY DAY (Patient not taking: Reported on 5/29/2018) 100 capsule 3       Medical History: any changes in medical history since they were last seen? No    Review of Systems:  The 14 system ROS was reviewed from the intake questionnaire, and is positive for: vision changes, edema, palpitations, abdominal pain, nausea, joint pain, stiffness, back pain, weakness, numbness, tingling, and tremor  Any bowel or bladder problems: none  Mood: normal    Physical Exam:  /73  Pulse 77  Wt 73.9 kg (163 lb)  BMI 25.53 kg/m2  Constitutional: alert and no distress.  Pt is overweight  Head: Normocephalic. No masses, lesions, tenderness or abnormalities  ENT: EOMI, mucosal surfaces moist.  Neck with full ROM, posture fair  Cardiovascular: No edema or JVD appreciated.  Respiratory: Good diaphragmatic excursion. No wheezes appreciated.  Speaking in complete sentences without shortness of breath.  No accessory muscle use.   Musculoskeletal: extremities normal- no gross deformities noted, normal muscle tone and able to move about the exam room without difficulty.    Skin: no suspicious lesions or rashes appreciated on exposed areas  Neurologic: Gait stiff and forward flexed on initial rising, mildly wide based stance.  Able to stand on toes and heels. Moving all extremities spontaneously, no apparent weakness.    Psychiatric: mentation appears normal, affect full and good eye contact.      Cervical Spine:  Good range of motion, exam limited  Lumbar Spine:  Well healed lumbar surgical scars, tender along the lower paraspinal and gluteal muscles, right greater than left, with  tenderness at L5-S1 and at the SI joint, flexes minimally due to fusion and pain, lateral bending is positive for facet loading pain. Straight leg raise is equivocal left, positive right with more lower back than lower extremity pain, hamstrings are tight    Assessment:   1.  Chronic pain syndrome  2.  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  Diffuse myofascial pain (muscle pain)  4.  Muscle spasm/dystonia  5.  History of closed head injury  6.  Lumbar radiculopathy (radiating pain)  7.  Depression/anxiety  8.  Right foot drop - intermittent  9.  Proximal lower extremity weakness  10.  Chronic headache  11.  History of breast cancer, lung cancer  12.  Right hip and groin pain    Yusra Rivas is a 63 year old female who is seen at the pain clinic for chronic right lower back pain radiating down the right lower extremity. She received some improvement in her hip pain with the hip injection, but not significant.  She has worsened lower back, hip, and lower extremity pain but feels some of it may be due to kicking the neighbors dog as it was attacking her dog.  Her pain is radicular in nature and is causing more limitations.  Our treatment plan is as outlined below.      Plan:  1. Physical Therapy:  Resume physical therapy when able, continue self directed exercise as tolerated  2. Clinical Health Psychologist to address issues of relaxation, behavioral change, coping style, and other factors important to improvement.  deferred  3. Diagnostic Studies:    1. Right hip x-rays  2. Updated lumbar spine MRI ordered today  4. Medication Management:    1. Continue Norco 5/325 2-3 per day as needed for pain  2. Continue Gabapentin 900 mg TID  3. Continue Flexeril or Robaxin as needed for muscle spasm  5. Further procedures recommended:   1. Consider lumbar epidural steroid injection - will determine levels after updated MRI  2. Lumbar trigger point injections at next available  3. Consider repeat right hip  joint injection depending on x-rays  6. Recommendations to PCP: continue current treatment  7. Smoking cessation and smoking effects on healing and chronic pain discussed with patient   8. Follow up: 3 months and for injections     Nai Hernandez MD  Pain Medicine Fellow    I saw and examined the patient with the Pain Fellow/Resident. I have reviewed and agree with the resident's note and plan of care and made changes and corrections directly to the body of the note.    TIME SPENT:  BY FELLOW/RESIDENT ALONE 0 MIN  BY MYSELF AND FELLOW/RESIDENT TOGETHER 30 MIN  BY MYSELF WITHOUT THE FELLOW/RESIDENT 0 MIN    These times included at least 50% of the time spent counseling her about her diagnosis and treatment options and coordination of care with the primary team      Antoine Narvaez MD  [facility]

## 2018-07-23 NOTE — LETTER
[facility]    07/23/18    Patient: Yusra Rivas  YOB: 1955  Medical Record Number: [id number]                                                                  Controlled Substance Agreement  I understand that my care provider has prescribed controlled substances (narcotics, tranquilizers, and/or stimulants) to help manage my condition(s).  I am taking this medicine to help me function or work.  I know that this is strong medicine.  It could have serious side effects and even cause a dependency on the drug.  If I stop these medicines suddenly, I could have severe withdrawal symptoms.    The risks, benefits, and side effects of these medication(s) were explained to me.  I agree that:  1. I will take part in other treatments as advised by my provider.  This may be psychiatry or counseling, physical therapy, behavioral therapy, group treatment, or a referral to a pain clinic.  I will reduce or stop my medicine when my provider tells me to do so.   2. I will take my medicines as prescribed.  I will not change the dose or schedule unless my provider tells me to.  There will be no refills if I  run out early.   I may be contacted at any time without warning and asked to complete a drug test or pill count.   3. I will keep all my appointments at the clinic.  If I miss appointments or fail to follow instructions, my provider may stop my medicine.  4. I will not ask other providers to prescribe controlled substances. And I will not accept controlled substances from other people. If I need another prescribed controlled substance for a new reason, I will notify my provider within one business day.  5. If I enroll in the [place] Medical Marijuana program, I will tell my provider.  I will also sign an agreement to share my medical records with my provider.  6. I will use one pharmacy to fill all of my controlled substance prescriptions.  If my prescription is mailed to my pharmacy, it  may take 5 to 7 days for my medicine to be ready.  7. I understand that my provider, clinic care team, and pharmacy can track controlled substance prescriptions from other providers through a central database (prescription monitoring program).  8. I will bring in my list of medications (or my medicine bottles) each time I come to the clinic.  [id number] REV-  07/2018                                                                                                                                   Page 1 of 2      [facility]    07/23/18    Patient: Yusra Rivas  YOB: 1955  Medical Record Number: [id number]    9. Refills of controlled substances will be made only during office hours.  It is up to me to make sure that I do not run out of my medicines on weekends or holidays.    10. I am responsible for my prescriptions.  If the medicine/prescription is lost or stolen, it will not be replaced.   I also agree not to share these medicines with anyone.  11. I agree to not use ANY illegal or recreational drugs.  This includes marijuana, cocaine, bath salts or other drugs.  I agree not to use alcohol unless my provider says I may.  I agree to give urine samples whenever asked.  If I fail to give a urine sample, the provider may stop my medicine.     12. I will tell my nurse or provider right away if I become pregnant or have a new medical problem treated outside of [facility].  13. I understand that this medicine can affect my thinking and judgment.  It may be unsafe for me to drive, use machinery and do dangerous tasks.  I will not do any of these things until I know how the medicine affects me.  If my dose changes, I will wait to see how it affects me.  I will contact my provider if I have concerns about medicine side effects.  I understand that if I do not follow any of the conditions above, my prescriptions or treatment may be stopped.    I agree that my provider, clinic care  team, and pharmacy may work with any city, state or federal law enforcement agency that investigates the misuse, sale, or other diversion of my controlled medicine. I will allow my provider to discuss my care with or share a copy of this agreement with any other treating provider, pharmacy or emergency room where I receive care.  I agree to give up (waive) any right of privacy or confidentiality with respect to these authorizations.   I have read this agreement and have asked questions about anything I did not understand.   ___________________________________    ___________________________  Patient Signature                                                           Date and Time  ___________________________________     ____________________________  Witness                                                                            Date and Time  ___________________________________  Antoine Narvaez MD  [id number] REV-  07/2018                                                                                                                                                   Page 2 of 2  Opioid Pain Medicines (also known as Narcotics)  What You Need to Know      What are opioids?   Opioids are pain medicines that must be prescribed by a doctor. Examples are:     morphine (MS Contin, Jenna)    oxycodone (Oxycontin)    oxycodone and acetaminophen (Percocet)    hydrocodone and acetaminophen (Vicodin, Norco)     fentanyl patch (Duragesic)     hydromorphone (Dilaudid)     methadone     What do opioids do well?   Opioids are best for short-term pain after a surgery or injury. They also work well for cancer pain. Unlike other pain medicines, they do not cause liver or kidney failure or ulcers. They may help some people with long-lasting (chronic) pain.     What do opioids NOT do well?   Opioids never get rid of pain entirely, and they do not work well for most patients with chronic pain. Opioids do not reduce swelling, one of  the causes of pain. They also don t work well for nerve pain.     Side effects  Talk to your doctor before you start or decide to keep taking one of these medicines. Side effects include:    Lowers your breathing rate enough that it could cause death    Death due to taking more than the prescribed dose    Serious lifelong opioid use      Dependence is not the same as addiction. Addiction is when people keep using a substance that harms their body, their mind or their relations with others. If you have a history of drug or alcohol abuse, taking opioids can cause a relapse.  Over time, opioids don t work as well. Most people will need higher and higher doses. The higher the dose, the more serious the side effects. We don t know the long-term effects of opioids.   People who have used opioids for a long time have a lower quality of life, worse depression, higher levels of pain and more visits to doctors.  Overdose from prescription drugs is the second leading cause of death in the [place] The risk of overdose rises when opioids are taken with other drugs such as:    Medicines used for anxiety and panic attacks (such as lorazepam, alprazolam, clonazepam    Other sedatives    Alcohol    Illegal drugs such as heroin  Never share your opioids with others. Be sure to store opioids in a secure place, locked if possible.Young children can easily swallow them and overdose.     Are there other ways to manage pain?  Ways to help reduce pain:    Exercise every day.    Treat health problems that may be causing pain.    Treat mental health problems like depression and anxiety.     Worse depression symptoms; Less pleasure in things you usually enjoy    Feeling tired or sluggish    Slower thoughts or cloudy thinking    Being more sensitive to pain over time; Pain is harder to control.    Trouble sleeping or restless sleep    Changes in hormone levels (for example, less testosterone).     Changes in sex drive or ability to have  sex    Long lasting nausea and constipation    Trouble breathing while asleep; This is worse with lung problems like COPD or sleep apnea.    Unsafe driving    Getting sick more often    Itching    Feeling dizzy    Dry mouth    Sweating    Trouble emptying the bladder (peeing). This is worse if you have an enlarged prostate or get urinary tract infections (UTIs).    What else should I know about opioids?  When someone takes opioids for too long or too often, they become dependent. This means that if you stop or reduce the medicine too quickly, you will have withdrawal symptoms.          Practice good sleep habits.  Try to go to bed and get up at the same time every day.    Stop smoking.  Tobacco use can make pain worse.    Do things that you enjoy.    Find a way to work through pain without drugs.  Try deep breathing, meditation, visual imagery and aromatherapy.    Ask your doctor to help you create a plan to manage your pain.

## 2018-07-23 NOTE — PATIENT INSTRUCTIONS
Assessment:   1.  Chronic pain syndrome  2.  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  Diffuse myofascial pain (muscle pain)  4.  Muscle spasm/dystonia  5.  History of closed head injury  6.  Lumbar radiculopathy (radiating pain)  7.  Depression/anxiety  8.  Right foot drop - intermittent  9.  Proximal lower extremity weakness  10.  Chronic headache  11.  History of breast cancer, lung cancer  12.  Right hip and groin pain      Plan:  1. Physical Therapy:  Resume physical therapy when able, continue self directed exercise as tolerated  2. Clinical Health Psychologist to address issues of relaxation, behavioral change, coping style, and other factors important to improvement.  deferred  3. Diagnostic Studies:    1. Right hip x-rays  2. Updated lumbar spine MRI ordered today  4. Medication Management:    1. Continue Norco 5/325 2-3 per day as needed for pain  2. Continue Gabapentin 900 mg TID  3. Continue Flexeril or Robaxin as needed for muscle spasm  5. Further procedures recommended:   1. Consider lumbar epidural steroid injection - will determine levels after updated MRI  2. Lumbar trigger point injections at next available  3. Consider repeat right hip joint injection depending on x-rays  6. Recommendations to PCP: continue current treatment  7. Smoking cessation and smoking effects on healing and chronic pain discussed with patient   8. Follow up: 3 months and for injections     ----------------------------------------------------------------  Nurse Triage line:  [phone]   Call this number with any questions or concerns. You may leave a detailed message anytime. Calls are typically returned Monday through Friday between 8 AM and 4:30 PM. We usually get back to you within 2 business days depending on the issue/request.       Medication refills:    For non-narcotic medications, call your pharmacy directly to request a refill. The pharmacy will contact the [facility] for  authorization. Please allow 3-4 days for these refills to be processed.     For narcotic refills, call the nurse triage line or send a [employer] message. Please contact us 7-10 days before your refill is due. The message MUST include the name of the specific medication(s) requested and how you would like to receive the prescription(s). The options are as follows:    Pain Clinic staff can mail the prescription to your pharmacy. Please tell us the name of the pharmacy.    You may pick the prescription up at the Pain Clinic (tell us the location) or during a clinic visit with your pain provider    Pain Clinic staff can deliver the prescription to the [place] pharmacy in the clinic building. Please tell us the location.      Scheduling number: [phone].  Call this number to schedule or change appointments.    We believe regular attendance is key to your success in our program.    Any time you are unable to keep your appointment we ask that you call us at least 24 hours in advance to let us know. This will allow us to offer the appointment time to another patient.

## 2018-07-23 NOTE — MR AVS SNAPSHOT
After Visit Summary   7/23/2018    Yusra Rivas    MRN: [id number]           Patient Information     Date Of Birth          1955        Visit Information        Provider Department      7/23/2018 2:30 PM Antoine Ibrahim MD [facility]        Today's Diagnoses     Lumbar radiculopathy    -  1    Chronic right hip pain        Myofascial pain          Care Instructions    Assessment:   1.  Chronic pain syndrome  2.  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  Diffuse myofascial pain (muscle pain)  4.  Muscle spasm/dystonia  5.  History of closed head injury  6.  Lumbar radiculopathy (radiating pain)  7.  Depression/anxiety  8.  Right foot drop - intermittent  9.  Proximal lower extremity weakness  10.  Chronic headache  11.  History of breast cancer, lung cancer  12.  Right hip and groin pain      Plan:  1. Physical Therapy:  Resume physical therapy when able, continue self directed exercise as tolerated  2. Clinical Health Psychologist to address issues of relaxation, behavioral change, coping style, and other factors important to improvement.  deferred  3. Diagnostic Studies:    1. Right hip x-rays  2. Updated lumbar spine MRI ordered today  4. Medication Management:    1. Continue Norco 5/325 2-3 per day as needed for pain  2. Continue Gabapentin 900 mg TID  3. Continue Flexeril or Robaxin as needed for muscle spasm  5. Further procedures recommended:   1. Consider lumbar epidural steroid injection - will determine levels after updated MRI  2. Lumbar trigger point injections at next available  3. Consider repeat right hip joint injection depending on x-rays  6. Recommendations to PCP: continue current treatment  7. Smoking cessation and smoking effects on healing and chronic pain discussed with patient   8. Follow up: 3 months and for injections     ----------------------------------------------------------------  Nurse Triage line:  [phone]   Call this  number with any questions or concerns. You may leave a detailed message anytime. Calls are typically returned Monday through Friday between 8 AM and 4:30 PM. We usually get back to you within 2 business days depending on the issue/request.       Medication refills:    For non-narcotic medications, call your pharmacy directly to request a refill. The pharmacy will contact the [facility] for authorization. Please allow 3-4 days for these refills to be processed.     For narcotic refills, call the nurse triage line or send a [employer] message. Please contact us 7-10 days before your refill is due. The message MUST include the name of the specific medication(s) requested and how you would like to receive the prescription(s). The options are as follows:    Pain Clinic staff can mail the prescription to your pharmacy. Please tell us the name of the pharmacy.    You may pick the prescription up at the Pain Clinic (tell us the location) or during a clinic visit with your pain provider    Pain Clinic staff can deliver the prescription to the [place] pharmacy in the clinic building. Please tell us the location.      Scheduling number: [phone].  Call this number to schedule or change appointments.    We believe regular attendance is key to your success in our program.    Any time you are unable to keep your appointment we ask that you call us at least 24 hours in advance to let us know. This will allow us to offer the appointment time to another patient.               Follow-ups after your visit        Additional Services     PAIN INJECTION EVAL/TREAT/FOLLOW UP                 Your next 10 appointments already scheduled     Mar 19, 2019  2:30 PM CDT   [facility] with Sandra [facility] MD   [facility]    [address] [place] [id number] [phone]              Future tests that were ordered for you today     Open Future Orders        Priority  Expected Expires Ordered    MR Lumbar Spine w/o Contrast Routine  7/23/2019 7/23/2018    XR Hip Bilateral 2 Views Each Routine 7/23/2018 7/23/2019 7/23/2018            Who to contact     If you have questions or need follow up information about today's clinic visit or your schedule please contact [facility] directly at [phone].  Normal or non-critical lab and imaging results will be communicated to you by MyChart, letter or phone within 4 business days after the clinic has received the results. If you do not hear from us within 7 days, please contact the clinic through MyChart or phone. If you have a critical or abnormal lab result, we will notify you by phone as soon as possible.  Submit refill requests through "[employer] or call your pharmacy and they will forward the refill request to us. Please allow 3 business days for your refill to be completed.          Additional Information About Your Visit        Care EveryWhere ID     This is your Care EveryWhere ID. This could be used by other organizations to access your [facility] medical records  [id number]        Your Vitals Were     Pulse BMI (Body Mass Index)                77 25.53 kg/m2           Blood Pressure from Last 3 Encounters:   07/23/18 134/73   06/04/18 109/56   05/29/18 120/76    Weight from Last 3 Encounters:   07/23/18 73.9 kg (163 lb)   05/29/18 72.9 kg (160 lb 11.2 oz)   05/08/18 71.7 kg (158 lb)              We Performed the Following     PAIN INJECTION EVAL/TREAT/FOLLOW UP        Primary Care Provider Office Phone # Fax #    Munir Kamaljit Lynn [phone]       [address] [place] [address]        Equal Access to Services     [facility]: Hadii jeff garay Soguy, waaxda luqadaha, qaybta kaalmada cruzito, trisha wilson. So [facility] [phone].    ATENCIÓN: Si habla español, tiene a ruiz disposición servicios gratuitos de asistencia lingüística. Llame al  [phone].    We comply with applicable federal civil rights laws and [place] laws. We do not discriminate on the basis of race, color, national origin, age, disability, sex, sexual orientation, or gender identity.            Thank you!     Thank you for choosing [facility]  for your care. Our goal is always to provide you with excellent care. Hearing back from our patients is one way we can continue to improve our services. Please take a few minutes to complete the written survey that you may receive in the mail after your visit with us. Thank you!             Your Updated Medication List - Protect others around you: Learn how to safely use, store and throw away your medicines at www.disposemymeds.org.          This list is accurate as of 7/23/18  3:20 PM.  Always use your most recent med list.                   Brand Name Dispense Instructions for use Diagnosis    cholecalciferol 1000 UNIT tablet    vitamin D3    200 tablet    TAKE TWO TABLETS BY MOUTH EVERY DAY    Routine general medical examination at a health care facility        MG capsule   Generic drug:  docusate sodium     100 capsule    TAKE ONE CAPSULE BY MOUTH EVERY DAY    Slow transit constipation       gabapentin 300 MG capsule    NEURONTIN    270 capsule    Take 3 capsules (900 mg) by mouth 3 times daily    Encounter for other specified aftercare       HYDROcodone-acetaminophen 5-325 MG per tablet    NORCO    75 tablet    Take 1 tablet by mouth every 6 hours as needed for pain Max of 3 tablets per day on bad days. This is a 30 day supply.  Dispense on/after 7/12/18.  To start on 7/14/18    Spasm, Neck pain       metoprolol tartrate 50 MG tablet    LOPRESSOR    180 tablet    TAKE ONE TABLET BY MOUTH TWICE A DAY    Hypertension goal BP (blood pressure) < 130/80       * multivitamin Tabs per tablet     60 tablet    TAKE ONE TABLET BY MOUTH EVERY DAY    Routine history and physical examination of adult       * multivitamin Tabs  per tablet     90 tablet    Take 1 tablet by mouth daily    Vitamin deficiency       polyethylene glycol powder    MIRALAX/GLYCOLAX    527 g    STIR 17 GM OF POWDER (SEE DEXTER INSIDE CAP) IN 8-OZ OF LIQUID UNTIL COMPLETELY DISSOLVED. DRINK THE SOLUTION DAILY OR AS DIRECTED.    Slow transit constipation       VITAMIN B-12 PO      Take by mouth daily        VITAMIN B6 PO      Take by mouth daily        * Notice:  This list has 2 medication(s) that are the same as other medications prescribed for you. Read the directions carefully, and ask your doctor or other care provider to review them with you.

## 2018-07-25 ENCOUNTER — OFFICE VISIT (OUTPATIENT)
Dept: PALLIATIVE MEDICINE | Facility: CLINIC | Age: 63
End: 2018-07-25
Payer: MEDICARE

## 2018-07-25 VITALS — DIASTOLIC BLOOD PRESSURE: 62 MMHG | SYSTOLIC BLOOD PRESSURE: 111 MMHG | HEART RATE: 71 BPM

## 2018-07-25 DIAGNOSIS — M25.551 CHRONIC RIGHT HIP PAIN: ICD-10-CM

## 2018-07-25 DIAGNOSIS — M79.18 MYOFASCIAL PAIN: ICD-10-CM

## 2018-07-25 DIAGNOSIS — M70.61 TROCHANTERIC BURSITIS OF RIGHT HIP: Primary | ICD-10-CM

## 2018-07-25 DIAGNOSIS — G89.29 CHRONIC RIGHT HIP PAIN: ICD-10-CM

## 2018-07-25 PROCEDURE — 20552 NJX 1/MLT TRIGGER POINT 1/2: CPT | Mod: 59 | Performed by: ANESTHESIOLOGY

## 2018-07-25 PROCEDURE — 20610 DRAIN/INJ JOINT/BURSA W/O US: CPT | Mod: RT | Performed by: ANESTHESIOLOGY

## 2018-07-25 ASSESSMENT — PAIN SCALES - GENERAL: PAINLEVEL: EXTREME PAIN (8)

## 2018-07-25 NOTE — MR AVS SNAPSHOT
After Visit Summary   7/25/2018    Yusra Rivas    MRN: [id number]           Patient Information     Date Of Birth          1955        Visit Information        Provider Department      7/25/2018 2:30 PM Antoine Ibrahim MD [facility]        Care Instructions    [facility]   Post Procedure Instructions    Today you had:  trigger point injections   And hip bursa injection  Medications used:  lidocaine   bupivicaine  kenalog        Monitor the injection sites for signs and symptoms of infection-fever, chills, redness, swelling, warmth, or drainage to areas.    You may have soreness at injection sites for up to 24 hours.    You may apply ice to the painful areas to help minimize the discomfort of the needle pokes.    Do not apply heat to sites for at least 12 hours.    You may use anti-inflammatory medications or Tylenol for pain control if necessary    Pain Clinic phone number during work hours Monday-Friday:  [phone]    After hours provider line: [phone]        ----------------------------------------------------------------  Nurse Triage line:  [phone]   Call this number with any questions or concerns. You may leave a detailed message anytime. Calls are typically returned Monday through Friday between 8 AM and 4:30 PM. We usually get back to you within 2 business days depending on the issue/request.       Medication refills:    For non-narcotic medications, call your pharmacy directly to request a refill. The pharmacy will contact the [facility] Management Center for authorization. Please allow 3-4 days for these refills to be processed.     For narcotic refills, call the nurse triage line or send a [employer] message. Please contact us 7-10 days before your refill is due. The message MUST include the name of the specific medication(s) requested and how you would like to receive the prescription(s). The options are as follows:    Pain  Clinic staff can mail the prescription to your pharmacy. Please tell us the name of the pharmacy.    You may pick the prescription up at the Pain Clinic (tell us the location) or during a clinic visit with your pain provider    Pain Clinic staff can deliver the prescription to the [place] pharmacy in the clinic building. Please tell us the location.      Scheduling number: [phone].  Call this number to schedule or change appointments.    We believe regular attendance is key to your success in our program.    Any time you are unable to keep your appointment we ask that you call us at least 24 hours in advance to let us know. This will allow us to offer the appointment time to another patient.                 Follow-ups after your visit        Your next 10 appointments already scheduled     Jul 26, 2018  4:15 PM CDT   MR LUMBAR SPINE W/O CONTRAST with PHMR1   [facility] ([facility])    [address] [place] [id number] [phone]           Take your medicines as usual, unless your doctor tells you not to. Bring a list of your current medicines to your exam (including vitamins, minerals and over-the-counter drugs). Also bring the results of similar scans you may have had.  Please remove any body piercings and hair extensions before you arrive.  Follow your doctor s orders. If you do not, we may have to postpone your exam.  You may or may not receive IV contrast for this exam pending the discretion of the Radiologist.  You do not need to do anything special to prepare.  The MRI machine uses a strong magnet. Please wear clothes without metal (snaps, zippers). A sweatsuit works well, or we may give you a hospital gown.   **IMPORTANT** THE INSTRUCTIONS BELOW ARE ONLY FOR THOSE PATIENTS WHO HAVE BEEN PRESCRIBED SEDATION OR GENERAL ANESTHESIA DURING THEIR MRI PROCEDURE:  IF YOUR DOCTOR PRESCRIBED ORAL SEDATION (take medicine to help you relax during your exam):   You must get  the medicine from your doctor (oral medication) before you arrive. Bring the medicine to the exam. Do not take it at home. You ll be told when to take it upon arriving for your exam.   Arrive one hour early. Bring someone who can take you home after the test. Your medicine will make you sleepy. After the exam, you may not drive, take a bus or take a taxi by yourself.  IF YOUR DOCTOR PRESCRIBED IV SEDATION:   Arrive one hour early. Bring someone who can take you home after the test. Your medicine will make you sleepy. After the exam, you may not drive, take a bus or take a taxi by yourself.   No eating 6 hours before your exam. You may have clear liquids up until 4 hours before your exam. (Clear liquids include water, clear tea, black coffee and fruit juice without pulp.)  IF YOUR DOCTOR PRESCRIBED ANESTHESIA (be asleep for your exam):   Arrive 1 1/2 hours early. Bring someone who can take you home after the test. You may not drive, take a bus or take a taxi by yourself.   No eating 8 hours before your exam. You may have clear liquids up until 4 hours before your exam. (Clear liquids include water, clear tea, black coffee and fruit juice without pulp.)   You will spend four to five hours in the recovery room.  Please call the Imaging Department at your exam site with any questions.            Oct 29, 2018  3:30 PM CDT   Return Visit with Antoine Narvaez MD   [facility] ([facility])    [id number] [place] [address] [phone]            Mar 19, 2019  2:30 PM CDT   Return Visit with Sandra Arroyo MD   [facility]    [address] [address] [phone]              Who to contact     If you have questions or need follow up information about today's clinic visit or your schedule please contact [facility] directly at [phone].  Normal or non-critical lab  and imaging results will be communicated to you by MyChart, letter or phone within 4 business days after the clinic has received the results. If you do not hear from us within 7 days, please contact the clinic through MyChart or phone. If you have a critical or abnormal lab result, we will notify you by phone as soon as possible.  Submit refill requests through [employer] or call your pharmacy and they will forward the refill request to us. Please allow 3 business days for your refill to be completed.          Additional Information About Your Visit        Care EveryWhere ID     This is your Care EveryWhere ID. This could be used by other organizations to access your [facility] medical records  [id number]        Your Vitals Were     Pulse                   71            Blood Pressure from Last 3 Encounters:   07/25/18 111/62   07/23/18 134/73   06/04/18 109/56    Weight from Last 3 Encounters:   07/23/18 73.9 kg (163 lb)   05/29/18 72.9 kg (160 lb 11.2 oz)   05/08/18 71.7 kg (158 lb)              Today, you had the following     No orders found for display       Primary Care Provider Office Phone # Fax #    Munir Kamaljit [place]it, [phone]       [address] [place] [address]        Equal Access to Services     [facility]: Hadii aad ku hadasho Soomaali, waaxda luqadaha, qaybta kaalmada adeegyada, waxay shadi wilson. So [facility] [phone].    ATENCIÓN: Si habla español, tiene a ruiz disposición servicios gratuitos de asistencia lingüística. Llhenok al [phone].    We comply with applicable federal civil rights laws and [place] laws. We do not discriminate on the basis of race, color, national origin, age, disability, sex, sexual orientation, or gender identity.            Thank you!     Thank you for choosing [facility]  for your care. Our goal is always to provide you with excellent care. Hearing back from our patients is one way we  can continue to improve our services. Please take a few minutes to complete the written survey that you may receive in the mail after your visit with us. Thank you!             Your Updated Medication List - Protect others around you: Learn how to safely use, store and throw away your medicines at www.disposemymeds.org.          This list is accurate as of 7/25/18  2:41 PM.  Always use your most recent med list.                   Brand Name Dispense Instructions for use Diagnosis    cholecalciferol 1000 UNIT tablet    vitamin D3    200 tablet    TAKE TWO TABLETS BY MOUTH EVERY DAY    Routine general medical examination at a health care facility        MG capsule   Generic drug:  docusate sodium     100 capsule    TAKE ONE CAPSULE BY MOUTH EVERY DAY    Slow transit constipation       gabapentin 300 MG capsule    NEURONTIN    270 capsule    Take 3 capsules (900 mg) by mouth 3 times daily    Encounter for other specified aftercare       HYDROcodone-acetaminophen 5-325 MG per tablet    NORCO    75 tablet    Take 1 tablet by mouth every 6 hours as needed for pain Max of 3 tablets per day on bad days. This is a 30 day supply.  Dispense on/after 7/12/18.  To start on 7/14/18    Spasm, Neck pain       metoprolol tartrate 50 MG tablet    LOPRESSOR    180 tablet    TAKE ONE TABLET BY MOUTH TWICE A DAY    Hypertension goal BP (blood pressure) < 130/80       * multivitamin Tabs per tablet     60 tablet    TAKE ONE TABLET BY MOUTH EVERY DAY    Routine history and physical examination of adult       * multivitamin Tabs per tablet     90 tablet    Take 1 tablet by mouth daily    Vitamin deficiency       polyethylene glycol powder    MIRALAX/GLYCOLAX    527 g    STIR 17 GM OF POWDER (SEE DEXTER INSIDE CAP) IN 8-OZ OF LIQUID UNTIL COMPLETELY DISSOLVED. DRINK THE SOLUTION DAILY OR AS DIRECTED.    Slow transit constipation       VITAMIN B-12 PO      Take by mouth daily        VITAMIN B6 PO      Take by mouth daily        *  Notice:  This list has 2 medication(s) that are the same as other medications prescribed for you. Read the directions carefully, and ask your doctor or other care provider to review them with you.

## 2018-07-25 NOTE — PATIENT INSTRUCTIONS
[facility]   Post Procedure Instructions    Today you had:  trigger point injections   And hip bursa injection  Medications used:  lidocaine   bupivicaine  kenalog        Monitor the injection sites for signs and symptoms of infection-fever, chills, redness, swelling, warmth, or drainage to areas.    You may have soreness at injection sites for up to 24 hours.    You may apply ice to the painful areas to help minimize the discomfort of the needle pokes.    Do not apply heat to sites for at least 12 hours.    You may use anti-inflammatory medications or Tylenol for pain control if necessary    Pain Clinic phone number during work hours Monday-Friday:  [phone]    After hours provider line: [phone]        ----------------------------------------------------------------  Nurse Triage line:  [phone]   Call this number with any questions or concerns. You may leave a detailed message anytime. Calls are typically returned Monday through Friday between 8 AM and 4:30 PM. We usually get back to you within 2 business days depending on the issue/request.       Medication refills:    For non-narcotic medications, call your pharmacy directly to request a refill. The pharmacy will contact the [facility] Management Center for authorization. Please allow 3-4 days for these refills to be processed.     For narcotic refills, call the nurse triage line or send a Share0 message. Please contact us 7-10 days before your refill is due. The message MUST include the name of the specific medication(s) requested and how you would like to receive the prescription(s). The options are as follows:    Pain Clinic staff can mail the prescription to your pharmacy. Please tell us the name of the pharmacy.    You may pick the prescription up at the Pain Clinic (tell us the location) or during a clinic visit with your pain provider    Pain Clinic staff can deliver the prescription to the [facility] pharmacy in the clinic  building. Please tell us the location.      Scheduling number: [phone].  Call this number to schedule or change appointments.    We believe regular attendance is key to your success in our program.    Any time you are unable to keep your appointment we ask that you call us at least 24 hours in advance to let us know. This will allow us to offer the appointment time to another patient.

## 2018-07-25 NOTE — PROGRESS NOTES
Pre procedure Diagnosis: right hip greater trochanteric bursitis, chronic right hip pain, myofascial pain   Post procedure Diagnosis: Same  Procedure performed: right hip bursa injection and trigger point injections  Anesthesia: none  Complications: none  Operators: Antoine Narvaez MD     Indications:   Yusra Rivas is a 63 year old female with a history of chronic lower back pain with muscle spasm as well as chronic right hip pain with referral to the knee.  Exam shows tenderness at the right hip greater trochanter and myofascial pain of the muscle groups listed below and they have tried conservative treatment including physical therapy, medications, and interventional procedures.    Options/alternatives, benefits and risks were discussed with the patient including bleeding, infection, tissue trauma and pnuemothorax.  Questions were answered to her satisfaction and she agrees to proceed. Voluntary informed consent was obtained and signed.     Vitals were reviewed: Yes  /62  Pulse 71  Allergies were reviewed:  Yes   Medications were reviewed:  Yes   Pre-procedure pain score: 8/10    Procedure:  After getting informed consent, a Pause for the Cause was performed.    The patient was positioned in a left lateral decubitus position and the point of maximal tenderness at the right hip greater trochanter was palpated and marked.  The area was cleaned with Chloroprep.  Then, a 25 G 3.5 inch spinal needle was inserted, aiming towards the trochanter.  When bone was encountered, the needle was slightly withdrawn.  Aspiration was negative.  Then, 5 ml of a combination of Kenalog 40 mg and Bupivicaine 0.5% 4 ml was injected into the bursa and the needle was withdrawn.  Hemostasis was achieved. Bandaids were placed as appropriate.    Then, trigger points were identified in the right lower back by the patient, and marked when appropriate.  The area was prepped with Chloroprep.    Using clean technique, injections  were completed using a 30G, 1 inch needle.  After negative aspiration, injection was completed.  A total of 5 locations were injected, 2 ml each site.    Muscle groups injected:  Lumbar paraspinal muscles  Gluteal muscles    Injection solution contained:  5 ml of 1% lidocaine and 5 ml of 0.5% bupivacaine (10 ml total).    Hemostasis was achieved, the area was cleaned, and bandaids were placed when appropriate.  The patient tolerated the procedure well.    Post-procedure pain score: 2/10  Follow-up includes:   -repeat prn  -follow up in the pain clinic as scheduled    Antoine Narvaez MD  [facility]

## 2018-07-26 ENCOUNTER — HOSPITAL ENCOUNTER (OUTPATIENT)
Dept: GENERAL RADIOLOGY | Facility: CLINIC | Age: 63
End: 2018-07-26
Attending: ANESTHESIOLOGY
Payer: MEDICARE

## 2018-07-26 ENCOUNTER — HOSPITAL ENCOUNTER (OUTPATIENT)
Dept: MRI IMAGING | Facility: CLINIC | Age: 63
Discharge: HOME | End: 2018-07-26
Attending: ANESTHESIOLOGY | Admitting: ANESTHESIOLOGY
Payer: MEDICARE

## 2018-07-26 DIAGNOSIS — M54.16 LUMBAR RADICULOPATHY: ICD-10-CM

## 2018-07-26 DIAGNOSIS — M25.551 CHRONIC RIGHT HIP PAIN: ICD-10-CM

## 2018-07-26 DIAGNOSIS — Z17.1 MALIGNANT NEOPLASM OF OVERLAPPING SITES OF RIGHT BREAST IN FEMALE, ESTROGEN RECEPTOR NEGATIVE (H): Primary | ICD-10-CM

## 2018-07-26 DIAGNOSIS — C50.811 MALIGNANT NEOPLASM OF OVERLAPPING SITES OF RIGHT BREAST IN FEMALE, ESTROGEN RECEPTOR NEGATIVE (H): Primary | ICD-10-CM

## 2018-07-26 DIAGNOSIS — Z00.00 ROUTINE GENERAL MEDICAL EXAMINATION AT A HEALTH CARE FACILITY: ICD-10-CM

## 2018-07-26 DIAGNOSIS — G89.29 CHRONIC RIGHT HIP PAIN: ICD-10-CM

## 2018-07-26 PROCEDURE — 72158 MRI LUMBAR SPINE W/O & W/DYE: CPT

## 2018-07-26 PROCEDURE — A9585 GADOBUTROL INJECTION: HCPCS | Performed by: ANESTHESIOLOGY

## 2018-07-26 PROCEDURE — 73522 X-RAY EXAM HIPS BI 3-4 VIEWS: CPT | Mod: TC

## 2018-07-26 PROCEDURE — 25000128 H RX IP 250 OP 636: Performed by: ANESTHESIOLOGY

## 2018-07-26 RX ORDER — GADOBUTROL 604.72 MG/ML
7.5 INJECTION INTRAVENOUS ONCE
Status: COMPLETED | OUTPATIENT
Start: 2018-07-26 | End: 2018-07-26

## 2018-07-26 RX ADMIN — GADOBUTROL 7.5 ML: 604.72 INJECTION INTRAVENOUS at 16:50

## 2018-07-26 NOTE — TELEPHONE ENCOUNTER
Routing refill request to provider for review/approval because:  RN unsure of follow up plan    Gabi Navarro, RN  [facility]

## 2018-07-26 NOTE — TELEPHONE ENCOUNTER
"Requested Prescriptions   Pending Prescriptions Disp Refills     multivitamin (I-GOLDIE) TABS per tablet [Pharmacy Med Name: I-GOLDIE  TABS] 60 tablet 2    Last Written Prescription Date:  1/9/18  Last Fill Quantity: 90,  # refills: 1   Last office visit: 8/23/2016 with prescribing provider:  8/23/16   Future Office Visit:     Sig: TAKE ONE TABLET BY MOUTH EVERY DAY    Vitamin Supplements (Adult) Protocol Passed    7/26/2018 10:15 AM       Passed - High dose Vitamin D not ordered       Passed - Recent (12 mo) or future (30 days) visit within the authorizing provider's specialty    Patient had office visit in the last 12 months or has a visit in the next 30 days with authorizing provider or within the authorizing provider's specialty.  See \"Patient Info\" tab in inbasket, or \"Choose Columns\" in Meds & Orders section of the refill encounter.              "

## 2018-07-27 RX ORDER — I-VITE, TAB 1000-60-2MG (60/BT) 300MCG-200
TAB ORAL
Qty: 60 TABLET | Refills: 2 | Status: SHIPPED | OUTPATIENT
Start: 2018-07-27 | End: 2018-09-12

## 2018-07-30 ENCOUNTER — TELEPHONE (OUTPATIENT)
Dept: PALLIATIVE MEDICINE | Facility: CLINIC | Age: 63
End: 2018-07-30

## 2018-07-30 DIAGNOSIS — M16.11 PRIMARY OSTEOARTHRITIS OF RIGHT HIP: Primary | ICD-10-CM

## 2018-07-30 NOTE — TELEPHONE ENCOUNTER
Attempted to contact patient regarding imaging results.  No answer.  Left brief message on voice mail.    Antoine Narvaez MD  [facility]

## 2018-08-04 DIAGNOSIS — R25.2 SPASM: ICD-10-CM

## 2018-08-04 DIAGNOSIS — M54.2 NECK PAIN: ICD-10-CM

## 2018-08-04 NOTE — TELEPHONE ENCOUNTER
VM left 8/3 at: 2:10 pm          Reason for call:  Medication   If this is a refill request, has the caller requested the refill from the pharmacy already? N/A  Will the patient be using a [facility]? Yes  Name of the pharmacy and phone number for the current request: [facility], [place] - [address]      Name of the medication requested: HYDROcodone-acetaminophen (NORCO) 5-325 MG per tablet      Other request: Pt would like rx sent to [facility] pharmacy, pt would like a call that we have received her message.          Phone number to reach patient:  Home number on file [phone] (home          Melida ROCA    [facility]

## 2018-08-07 ENCOUNTER — TELEPHONE (OUTPATIENT)
Dept: PALLIATIVE MEDICINE | Facility: CLINIC | Age: 63
End: 2018-08-07

## 2018-08-07 RX ORDER — HYDROCODONE BITARTRATE AND ACETAMINOPHEN 5; 325 MG/1; MG/1
1 TABLET ORAL EVERY 6 HOURS PRN
Qty: 75 TABLET | Refills: 0 | Status: SHIPPED | OUTPATIENT
Start: 2018-08-07 | End: 2018-09-04

## 2018-08-07 NOTE — TELEPHONE ENCOUNTER
Right Hip Bursa injection was unsuccessful, Pt stated that she got relief on the day of procedure only.  Pt reports tightness in the Right leg.      Pt noted that the trigger point injections settled the tightness in the hip area but everything below remained tight very tight.      Pt notes that chamberlain hip is significantly higher than the left.  Writer discussed that this could be related to the tightness that she is feeling in her right side.     Will forward to Dr. Sharri Narvaez to review and advise on next steps.    _______________  MRI note  May consider lumbar ANDIE vs hip joint injection depending on response to hip bursa injection.          Danis Luz, RN  Care Coordinator   [facility]

## 2018-08-07 NOTE — TELEPHONE ENCOUNTER
Medication refill information reviewed.     Due date for HYDROcodone-acetaminophen (NORCO) 5-325 MG per tablet  is 8/15/18     Prescriptions prepped for review.     Will route to provider.                    _______________________________________    Received call from patient requesting refill(s) of HYDROcodone-acetaminophen (NORCO) 5-325 MG per tablet     Last picked up from pharmacy on 7/16/18    Pt last seen by prescribing provider on 7/25/18  Next appt scheduled for 10/29/18     checked in the past 6 months? Yes If no, print current report and give to RN    Last urine drug screen date 5/8/18  Current opioid agreement on file (completed within the last year) No Date of opioid agreement: 12/27/16    Processing (pick one and delete the others):      Mail to [facility]   [address] [facility] [address]     Will route to nursing pool for review and preparation of prescription(s).       Danis Luz, RN  Care Coordinator   [facility]

## 2018-08-07 NOTE — TELEPHONE ENCOUNTER
Pre-screening Questions for Radiology Injections:    Injection to be done at which interventional clinic site? [place]    Instruct patient to arrive as directed prior to the scheduled appointment time:    [place] minutes before      [place]: 1 hour before     Procedure ordered by DR. CUEVAS    Procedure ordered? RIGHT Hip Joint Injection    What insurance would patient like us to bill for this procedure? MEDICARE AND Hi-Dis([place])      Worker's comp or MVA (motor vehicle accident) -Any injection DO NOT SCHEDULE and route to Melida Olivarez.      [employer] insurance - For SI joint injections, DO NOT SCHEDULE and route Lela Reveles. Hi-Dis([place]) FREEDOM NO PA REQUIRED EFFECTIVE 2017      HE[employer] [place]- MBB's must be scheduled at LEAST two weeks apart      [employer] - Any injection besides hip/shoulder/knee joint DO NOT SCHEDULE and route to Lela Reveles. She will obtain PA and call pt back to schedule procedure or notify pt of denial.       HP [employer]-Route to Lela for review    Any chance of pregnancy? NO   If YES, do NOT schedule and route to RN pool    Is an  needed? No     Patient has a drive home? (mandatory) YES:     Is patient taking any blood thinners (plavix, coumadin, jantoven, warfarin, heparin, pradaxa or dabigatran )? No   If hold needed, do NOT schedule, route to RN pool     Is patient taking any aspirin products? No     If more than 325mg/day do NOT schedule; route to RN pool     For CERVICAL procedures, hold all aspirin products for 6 days.      Does the patient have a bleeding or clotting disorder? No     If YES, okay to schedule AND route to RN nurse pool    **For any patients with platelet count <100, must be forwarded to provider**    Is patient diabetic?  No  If YES, have them bring their glucometer.    Does patient have an active infection or treated for one within the past week? No     Is patient currently taking any antibiotics?  No     For patients on chronic, preventative, or  prophylactic antibiotics, procedures may be scheduled.     For patients on antibiotics for active or recent infection:    Amparo Grullon Burton, [place]-antibiotic course must have been completed for 4 days    Is patient currently taking any steroid medications? (i.e. Prednisone, Medrol)  No     For patients on steroid medications:    Amparo Grullon Burton, [place]-steroid course must have been completed for 4 days    Reviewed with patient:  If you are started on any steroids or antibiotics between now and your appointment, you must contact us because it may affect our ability to perform your procedure.  Yes    Is patient actively being treated for cancer or immunocompromised? No  If YES, do NOT schedule and route to RN pool     Are you able to get on and off an exam table with minimal or no assistance? Yes  If NO, do NOT schedule and route to RN pool    Are you able to roll over and lay on your stomach with minimal or no assistance? Yes  If NO, do NOT schedule and route to RN pool     Any allergies to contrast dye, iodine, shellfish, or numbing and steroid medications? No  If YES, route to RN pool AND add allergy information to appointment notes    Allergies: Celexa [citalopram hydrobromide]; Cardizem cd; Zonisamide; Naprosyn [naproxen]; and Relafen [nabumetone]      Has the patient had a flu shot or any other vaccinations within 7 days before or after the procedure.  No     Does patient have an MRI/CT?  Not Applicable  (SI joint, hip injections, lumbar sympathetic blocks, and stellate ganglion blocks do not require an MRI)    Was the MRI done w/in the last 3 years?  NA    Was MRI done at [facility]? No      If not, where was it done? N/A       If MRI was not done at [facility], [facility] or [facility] do NOT schedule and route to nursing.  If pt has an imaging disc, the injection may be scheduled but pt has to bring disc to appt. If they show up w/out disc the injection cannot be  done    Reminders (please tell patient if applicable):       Instructed pt to arrive 30 minutes early for IV start if this is for a cervical procedure, ALL sympathetic (stellate ganglion, hypogastric, or lumbar sympathetic block) and all sedation procedures (RFA, spinal cord stimulation trials).  Not Applicable   -IVs are not routinely placed for Dr. Escobar cervical cases   -Dr. Smith: IVs for cervical ESIs and cervical TBDs (not CMBBs/facet inj)      If NPO for sedation, informed patient that it is okay to take medications with sips of water (except if they are to hold blood thinners).  Not Applicable   *DO take blood pressure medication if it is prescribed*      If this is for a cervical ANDIE, informed patient that aspirin needs to be held for 6 days.   Not Applicable      For all patients not having spinal cord stimulator (SCS) trials or radiofrequency ablations (RFAs), informed patient:    IV sedation is not provided for this procedure.  If you feel that an oral anti-anxiety medication is needed, you can discuss this further with your referring provider or primary care provider.  The Pain Clinic provider will discuss specifics of what the procedure includes at your appointment.  Most procedures last 10-20 minutes.  We use numbing medications to help with any discomfort during the procedure.  NO      Do not schedule procedures requiring IV placement in the first appointment of the day or first appointment after lunch. Do NOT schedule at 0745, 0815 or 1245.       For patients 85 or older we recommend having an adult stay w/ them for the remainder of the day.      Does the patient have any questions?  NO      Melida ROCA    [facility]    Melida Olivarez  [facility]

## 2018-08-07 NOTE — TELEPHONE ENCOUNTER
Signed Rx mailing from [place] on 08/08/18. Mailing to [facility]. Patient was informed.

## 2018-08-07 NOTE — TELEPHONE ENCOUNTER
Signed Prescriptions:                        Disp   Refills    HYDROcodone-acetaminophen (NORCO) 5-325 MG*75 tab*0        Sig: Take 1 tablet by mouth every 6 hours as needed for           pain Max of 3 tablets per day on bad days. This           is a 30 day supply.  Dispense on/after 8/13/18.            To start on 8/15/18  Authorizing Provider: ANTOINE SU    Reviewed, printed, signed in [place].  Given to MA for mailing to pharmacy.    Antoine Narvaez MD  [facility]

## 2018-08-07 NOTE — TELEPHONE ENCOUNTER
Information noted.  Placed order for right hip joint injection since lumbar MRI is relatively benign except for some foraminal narrowing at L5-S1 and hip x-rays show joint space narrowing and arthritic changes in the right hip.    Orders Placed This Encounter   Procedures     PAIN INJECTION EVAL/TREAT/FOLLOW UP       Antoine Narvaez MD  [facility]

## 2018-08-07 NOTE — TELEPHONE ENCOUNTER
Writer called pt and informed that Order was placed for Right hip injection.  Pt notified that scheduling will call Pt to schedule injection.    Danis Luz, RN  Care Coordinator   [facility]

## 2018-08-15 ENCOUNTER — TELEPHONE (OUTPATIENT)
Dept: ONCOLOGY | Facility: CLINIC | Age: 63
End: 2018-08-15

## 2018-08-15 NOTE — TELEPHONE ENCOUNTER
"TOBACCO TREATMENT INITIAL CONSULT    Subjective:        Patient is a 63 year old White female and was contacted to participate in Tobacco Treatment Program for Nicotine Dependence 8/15/2018 by the Tobacco Treatment Team. Patient reports using cigarettes. Has been smoking 1PPD. Relapsed after last cancer in 2013. Has been treated for both lung and breast cancer.     Patient is interested in quitting smoking. Has tried NRT patches in the past while in the hospital and felt they worked well for her. She is interested in Chantix, but concerned about side effects. Smokes out of boredom, stress and a way to relax at the end of the day while watching tv with her animals. Does not smoke around others and considers herself a \"closet smoker\". Reports keeping her cigarettes in the closet as well as a way to deter herself from smoking.            Information:      Agreed to Participate in Program - Yes    Proactive Outreach- Yes    Year of cancer diagnosis: 2013    Smoking Status:  Every Day Smoker    Has attempted to quit in the last 30 days:  No    Mental Health - N/A     Substance Use -  N/A    Previous Cessation Medication Used - 21mg Nicotine Patch    Nicotine Product Used - Cigarettes    Amount of Use (CPD) - 20 (1 pack) or more    Time to First Use -  < 30mins    Time of Last Cigarette: Smoked cigarette today (at least one puff)    Readiness (0-10) - 7    Barriers to quit- limited stress coping tools and lack of support       Summary of visit:      Yusra Rivas is still smoking/using tobacco: Yes  Discussed habit change regarding smoking  These MI interventions were used: Expressed Empathy/Understanding, Supported Autonomy, Collaboration, Evocation, Open-ended questions, Reflections: simple and complex and Change talk (evoked)  We discussed: Benefits of quitting  Ways to cope with cravings and manage triggers  Hints for managing nicotine withdrawal  Ways to prepare for a quit date  Patient is ready to quit smoking or " continue to stay 100% smoke-free.        Plan:      Quit Date: Patient will use cessation medication if MTM is covered. TTS will follow up weekly to assess progress.     MTM Consult Referral: accepted

## 2018-08-22 ENCOUNTER — ONCOLOGY VISIT (OUTPATIENT)
Dept: ONCOLOGY | Facility: CLINIC | Age: 63
End: 2018-08-22

## 2018-08-22 DIAGNOSIS — Z72.0 TOBACCO ABUSE DISORDER: Primary | ICD-10-CM

## 2018-08-22 NOTE — PROGRESS NOTES
TOBACCO TREATMENT FOLLOW UP CONSULT    Subjective:      Patient is a 63 year old White female and was contacted to participate in Tobacco Treatment Program for Nicotine Dependence 8/22/2018 by the Tobacco Treatment Team. Patient reports that she has been smoking Cigarettes.  She has been smoking about 0.50 packs per day. She has never used smokeless tobacco.     Patient has been able to reduce tobacco by 50% since last call (now smoking 10CPD). Has been too busy to smoke, but reports smoking to alleviate stress and anger at times, also uses it just to relax. 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 Triggers: coffee, mood, ways to relax.        Information:      Agreed to Participate in Program - Yes    Session Number: 2            Proactive Outreach- Yes    Smoking Status:  Every Day Smoker    Has attempted to quit in the last 30 days:  No    Current Cessation Medication Being Used - 21mg Nicotine Patch    Withdrawal Symptoms: Anger, Irritability, Frustration, Desire or craving to smoke and insomnia or sleep disturbance    Nicotine Product Used - Cigarettes    Amount of Use (CPD) - 10-14    Time to First Use -  < 30mins    Time of Last Cigarette: Smoked cigarette today (at least one puff)    Readiness (0-10) - 7    Barriers to quit- limited stress coping tools and Enjoys smoking        Summary of visit:      Yusra Rivas is still smoking/using tobacco: Yes  Discussed habit change regarding smoking  These MI interventions were used: Expressed Empathy/Understanding, Supported Autonomy, Collaboration, Evocation, Open-ended questions, Reflections: simple and complex and Change talk (evoked)  We discussed: Ways to cope with cravings and manage triggers  Hints for managing nicotine withdrawal  Ways to prepare for a quit date  Ways to reduce stress to prevent relapse  Patient is ready to quit smoking or continue to stay 100% smoke-free.        Plan:      Quit Date: Patient has MTM  appt on 9/12 and is looking to receive patches to quit smoking. Has requested TTS follows up 1 week after that appt. Will continue to monitor smoking use by paying attention to thoughts and triggers and finding other ways to manage behaviors such as writing.     MTM Consult Completed: Appointment scheduled

## 2018-08-22 NOTE — MR AVS SNAPSHOT
After Visit Summary   8/22/2018    Yusra Rivas    MRN: [id number]           Patient Information     Date Of Birth          1955        Visit Information        Provider Department      8/22/2018 9:51 AM Specialist, [facility]        Today's Diagnoses     Tobacco abuse disorder    -  1       Follow-ups after your visit        Your next 10 appointments already scheduled     Sep 12, 2018  9:00 AM CDT   TELEMEDICINE with Karol Quigley RPH   [facility] ([facility])    [address] [place] [address]  [address] [place] [id number] [phone]           Note: this is not an onsite visit; there is no need to come to the facility.            Oct 29, 2018  3:30 PM CDT   Return Visit with Antoine Narvaez MD   [facility] ([facility])    [id number] [place] [id number] [phone]            Mar 19, 2019  2:30 PM CDT   Return Visit with Sandra Arroyo MD   [facility]    [address] [place] [id number] [phone]              Who to contact     If you have questions or need follow up information about today's clinic visit or your schedule please contact [facility] directly at [phone].  Normal or non-critical lab and imaging results will be communicated to you by MyChart, letter or phone within 4 business days after the clinic has received the results. If you do not hear from us within 7 days, please contact the clinic through MyChart or phone. If you have a critical or abnormal lab result, we will notify you by phone as soon as possible.  Submit refill requests through [facility] or call your pharmacy and they will forward the refill request to us. Please allow 3 business days for your refill to be completed.          Additional Information About Your Visit        Care  EveryWhere ID     This is your Care EveryWhere ID. This could be used by other organizations to access your [facility] medical records  [id number]         Blood Pressure from Last 3 Encounters:   07/25/18 111/62   07/23/18 134/73   06/04/18 109/56    Weight from Last 3 Encounters:   07/23/18 73.9 kg (163 lb)   05/29/18 72.9 kg (160 lb 11.2 oz)   05/08/18 71.7 kg (158 lb)              Today, you had the following     No orders found for display       Primary Care Provider Office Phone # Fax #    Munir Lynn, [phone]       [address] [place] [address]        Equal Access to Services     ROSEMARY EDUARDO : Hadii aad ku hadasho Somaryseali, waaxda luqadaha, qaybta kaalmada adeegyada, waxay shadi wilson. So [facility] [phone].    ATENCIÓN: Si habla español, tiene a ruiz disposición servicios gratuitos de asistencia lingüística. [facility] [phone].    We comply with applicable federal civil rights laws and [place] laws. We do not discriminate on the basis of race, color, national origin, age, disability, sex, sexual orientation, or gender identity.            Thank you!     Thank you for choosing [facility]  for your care. Our goal is always to provide you with excellent care. Hearing back from our patients is one way we can continue to improve our services. Please take a few minutes to complete the written survey that you may receive in the mail after your visit with us. Thank you!             Your Updated Medication List - Protect others around you: Learn how to safely use, store and throw away your medicines at www.disposemymeds.org.          This list is accurate as of 8/22/18  9:56 AM.  Always use your most recent med list.                   Brand Name Dispense Instructions for use Diagnosis    cholecalciferol 1000 UNIT tablet    vitamin D3    200 tablet    TAKE TWO TABLETS BY MOUTH EVERY DAY    Routine general medical examination at a  health care facility        MG capsule   Generic drug:  docusate sodium     100 capsule    TAKE ONE CAPSULE BY MOUTH EVERY DAY    Slow transit constipation       gabapentin 300 MG capsule    NEURONTIN    270 capsule    Take 3 capsules (900 mg) by mouth 3 times daily    Encounter for other specified aftercare       HYDROcodone-acetaminophen 5-325 MG per tablet    NORCO    75 tablet    Take 1 tablet by mouth every 6 hours as needed for pain Max of 3 tablets per day on bad days. This is a 30 day supply.  Dispense on/after 8/13/18.  To start on 8/15/18    Spasm, Neck pain       metoprolol tartrate 50 MG tablet    LOPRESSOR    180 tablet    TAKE ONE TABLET BY MOUTH TWICE A DAY    Hypertension goal BP (blood pressure) < 130/80       * multivitamin Tabs per tablet     60 tablet    TAKE ONE TABLET BY MOUTH EVERY DAY    Routine history and physical examination of adult       * multivitamin Tabs per tablet     90 tablet    Take 1 tablet by mouth daily    Vitamin deficiency       * multivitamin Tabs per tablet     60 tablet    TAKE ONE TABLET BY MOUTH EVERY DAY    Routine general medical examination at a health care facility, Malignant neoplasm of overlapping sites of right breast in female, estrogen receptor negative (H)       polyethylene glycol powder    MIRALAX/GLYCOLAX    527 g    STIR 17 GM OF POWDER (SEE DEXTER INSIDE CAP) IN 8-OZ OF LIQUID UNTIL COMPLETELY DISSOLVED. DRINK THE SOLUTION DAILY OR AS DIRECTED.    Slow transit constipation       VITAMIN B-12 PO      Take by mouth daily        VITAMIN B6 PO      Take by mouth daily        * Notice:  This list has 3 medication(s) that are the same as other medications prescribed for you. Read the directions carefully, and ask your doctor or other care provider to review them with you.

## 2018-09-04 DIAGNOSIS — R25.2 SPASM: ICD-10-CM

## 2018-09-04 DIAGNOSIS — M54.2 NECK PAIN: ICD-10-CM

## 2018-09-04 RX ORDER — HYDROCODONE BITARTRATE AND ACETAMINOPHEN 5; 325 MG/1; MG/1
1 TABLET ORAL EVERY 6 HOURS PRN
Qty: 75 TABLET | Refills: 0 | Status: SHIPPED | OUTPATIENT
Start: 2018-09-04 | End: 2018-10-03

## 2018-09-04 NOTE — TELEPHONE ENCOUNTER
Signed Rx mailing from [place] on 09/05/18. Mailing to [facility]. Patient was informed.

## 2018-09-04 NOTE — TELEPHONE ENCOUNTER
Medication refill information reviewed.     Due date for HYDROcodone-acetaminophen (NORCO) 5-325 MG per tablet is 9/14/18    Prescriptions prepped for review.     Will route to provider.       Danis Luz, RN  Care Coordinator   [facility]

## 2018-09-04 NOTE — TELEPHONE ENCOUNTER
Received call from patient requesting refill(s) of HYDROcodone-acetaminophen (NORCO) 5-325 MG per tablet    Last picked up from pharmacy on 08/15/18    Pt last seen by prescribing provider on 07/25/18  Next appt scheduled for 10/29/18     checked in the past 6 months? Yes If no, print current report and give to RN    Last urine drug screen date 05/08/18  Current opioid agreement on file (completed within the last year) Yes Date of opioid agreement: 07/23/18    Processing (pick one and delete the others):      Mail to [facility] pharmacy   [address] [facility] [address]    Will route to nursing pool for review and preparation of prescription(s).

## 2018-09-04 NOTE — TELEPHONE ENCOUNTER
Reason for call:  Medication   If this is a refill request, has the caller requested the refill from the pharmacy already? No  Will the patient be using a [facility]? Yes  Name of the pharmacy and phone number for the current request: [facility] in [place]    Name of the medication requested: HYDROcodone-acetaminophen (NORCO) 5-325 MG per tablet      Phone number to reach patient:  Home number on file [phone] (home)      Can we leave a detailed message on this number?  YES       [place] HOGUE    [facility] [place]

## 2018-09-04 NOTE — TELEPHONE ENCOUNTER
Signed Prescriptions:                        Disp   Refills    HYDROcodone-acetaminophen (NORCO) 5-325 MG*75 tab*0        Sig: Take 1 tablet by mouth every 6 hours as needed for           pain Max of 3 tablets per day on bad days. This           is a 30 day supply.  Dispense on/after 9/12/18.            To start on 9/14/18  Authorizing Provider: ANTOINE SU    Reviewed, printed, signed in [place].  Given to MA for mailing to pharmacy.    Antoine Narvaez MD  [facility]

## 2018-09-12 ENCOUNTER — ALLIED HEALTH/NURSE VISIT (OUTPATIENT)
Dept: PHARMACY | Facility: CLINIC | Age: 63
End: 2018-09-12
Attending: PSYCHOLOGIST
Payer: COMMERCIAL

## 2018-09-12 DIAGNOSIS — G89.4 CHRONIC PAIN SYNDROME: ICD-10-CM

## 2018-09-12 DIAGNOSIS — E63.9 NUTRITIONAL DEFICIENCY: ICD-10-CM

## 2018-09-12 DIAGNOSIS — I48.91 ATRIAL FIBRILLATION WITH RAPID VENTRICULAR RESPONSE (H): ICD-10-CM

## 2018-09-12 DIAGNOSIS — Z71.6 ENCOUNTER FOR TOBACCO USE CESSATION COUNSELING: Primary | ICD-10-CM

## 2018-09-12 DIAGNOSIS — I10 HYPERTENSION GOAL BP (BLOOD PRESSURE) < 130/80: ICD-10-CM

## 2018-09-12 PROCEDURE — 99607 MTMS BY PHARM ADDL 15 MIN: CPT | Mod: U4 | Performed by: PHARMACIST

## 2018-09-12 PROCEDURE — 99605 MTMS BY PHARM NP 15 MIN: CPT | Mod: U4 | Performed by: PHARMACIST

## 2018-09-12 RX ORDER — NICOTINE 21 MG/24HR
1 PATCH, TRANSDERMAL 24 HOURS TRANSDERMAL EVERY 24 HOURS
Qty: 30 PATCH | Refills: 2 | Status: SHIPPED | OUTPATIENT
Start: 2018-09-12 | End: 2018-09-12

## 2018-09-12 NOTE — LETTER
"     [facility] MEDICATION THERAPY MANAGEMENT     Date: 2018    Yusra Rivas  [address] [facility] [address]    Dear Ms. Rivas,    Thank you for talking with me on 18 about your health and medications. Medicare s MTM (Medication Therapy Management) program helps you understand your medications and use them safely.      This letter includes an action plan (Medication Action Plan) and medication list (Personal Medication List). The action plan has steps you should take to help you get the best results from your medications. The medication list will help you keep track of your medications and how to use them the right way.       Have your action plan and medication list with you when you talk with your doctors, pharmacists, and other healthcare providers in your care team.     Ask your doctors, pharmacists, and other healthcare providers to update the action plan and medication list at every visit.     Take your medication list with you if you go to the hospital or emergency room.     Give a copy of the action plan and medication list to your family or caregivers.     If you want to talk about this letter or any of the papers with it, please call   [phone].   We look forward to working with you, your doctors, and other healthcare providers to help you stay healthy.     Sincerely,    Karol Quigley      Enclosed: Medication Action Plan and Personal Medication List    MEDICATION ACTION PLAN FOR Yusra Rivas,  1955     This action plan will help you get the best results from your medications if you:   1. Read \"What we talked about.\"   2. Take the steps listed in the \"What I need to do\" boxes.   3. Fill in \"What I did and when I did it.\"   4. Fill in \"My follow-up plan\" and \"Questions I want to ask.\"     Have this action plan with you when you talk with your doctors, pharmacists, and other healthcare providers in your care team. Share this with your family or caregivers too.  DATE PREPARED: " 2018  What we talked about: Smoking cessation                                                  What I need to do: Start Chantix and nicotine lozenges       What I did and when I did it:                                              My follow-up plan:                 Questions I want to ask:              If you have any questions about your action plan, call Karol Cramer Soraida, Phone: [phone] , Monday-Friday 8-4:30pm.           MEDICATION LIST FOR Yusra Rivas,  1955     This medication list was made for you after we talked. We also used information from your doctor's chart.      Use blank rows to add new medications. Then fill in the dates you started using them.    Cross out medications when you no longer use them. Then write the date and why you stopped using them.    Ask your doctors, pharmacists, and other healthcare providers to update this list at every visit. Keep this list up-to-date with:       Prescription medications    Over the counter drugs     Herbals    Vitamins    Minerals      If you go to the hospital or emergency room, take this list with you. Share this with your family or caregivers too.     DATE PREPARED: 2018  Allergies or side effects: Celexa [citalopram hydrobromide]; Cardizem cd; Zonisamide; Naprosyn [naproxen]; and Relafen [nabumetone]     Medication:   MG PO CAPS      How I use it:  TAKE ONE CAPSULE BY MOUTH EVERY DAY      Why I use it:  constipation    Prescriber:  Munir Lynn,       Date I started using it:       Date I stopped using it:         Why I stopped using it:            Medication:  GABAPENTIN 300 MG PO CAPS      How I use it:  Take 3 capsules (900 mg) by mouth 3 times daily      Why I use it: Pain control    Prescriber:  Antoine Narvaez MD      Date I started using it:       Date I stopped using it:         Why I stopped using it:            Medication:  HYDROCODONE-ACETAMINOPHEN 5-325 MG PO TABS      How I use it:  Take 1  tablet by mouth every 6 hours as needed for pain Max of 3 tablets per day on bad days. This is a 30 day supply.  Dispense on/after 9/12/18.  To start on 9/14/18      Why I use it: Pain control    Prescriber:  Antoine Narvaez MD      Date I started using it:       Date I stopped using it:         Why I stopped using it:            Medication:  I-GOLDIE PO TABS      How I use it:  TAKE ONE TABLET BY MOUTH EVERY DAY      Why I use it: supplement    Prescriber:  Alirio Simpson PA-C      Date I started using it:       Date I stopped using it:         Why I stopped using it:            Medication:  METOPROLOL TARTRATE 50 MG PO TABS      How I use it:  TAKE ONE TABLET BY MOUTH TWICE A DAY      Why I use it: Blood pressure, heart rhythm    Prescriber:  Munir Lynn,       Date I started using it:       Date I stopped using it:         Why I stopped using it:            Medication:  NICOTINE POLACRILEX 4 MG MT LOZG      How I use it:  Dissolve 1 tablet orally directed, as needed for smoking cravings.      Why I use it: Stop smoking    Prescriber:  Sandra Arroyo MD      Date I started using it:       Date I stopped using it:         Why I stopped using it:            Medication:  POLYETHYLENE GLYCOL 3350 PO POWD      How I use it:  STIR 17 GM OF POWDER (SEE DEXTER INSIDE CAP) IN 8-OZ OF LIQUID UNTIL COMPLETELY DISSOLVED. DRINK THE SOLUTION DAILY OR AS DIRECTED.      Why I use it: Constipation    Prescriber:  Munir Lynn,       Date I started using it:       Date I stopped using it:         Why I stopped using it:            Medication:  VARENICLINE TARTRATE 0.5 MG X 11 & 1 MG X 42 PO MISC      How I use it:  Take as directed per package instructions.      Why I use it: Stop smoking    Prescriber:  Sandra Arroyo MD      Date I started using it:       Date I stopped using it:         Why I stopped using it:            Medication:  VITAMIN B-12 PO      How I use it:  Take 1,000 mcg by  mouth daily       Why I use it:  supplement    Prescriber:  Patient Reported      Date I started using it:       Date I stopped using it:         Why I stopped using it:            Medication:  VITAMIN B6 PO      How I use it:  Take 100 mg by mouth daily       Why I use it:  supplement    Prescriber:  Patient Reported      Date I started using it:       Date I stopped using it:         Why I stopped using it:            Medication:  VITAMIN D3 1000 UNITS PO TABS      How I use it:  TAKE TWO TABLETS BY MOUTH EVERY DAY      Why I use it: supplement    Prescriber:  Munir Lynn, DO      Date I started using it:       Date I stopped using it:         Why I stopped using it:            Medication:         How I use it:         Why I use it:      Prescriber:         Date I started using it:       Date I stopped using it:         Why I stopped using it:            Medication:         How I use it:         Why I use it:      Prescriber:         Date I started using it:       Date I stopped using it:         Why I stopped using it:            Medication:         How I use it:         Why I use it:      Prescriber:         Date I started using it:       Date I stopped using it:         Why I stopped using it:              Other Information:     If you have any questions about your action plan, call [phone].    According to the Paperwork Reduction Act of 1995, no persons are required to respond to a collection of information unless it displays a valid OMB control number. The valid OMB number for this information collection is 7805-9312. The time required to complete this information collection is estimated to average 40 minutes per response, including the time to review instructions, searching existing data resources, gather the data needed, and complete and review the information collection. If you have any comments concerning the accuracy of the time estimate(s) or suggestions for improving this form,  please write to: CMS, Attn: PRA Reports Clearance Officer, [address] [place] [address], [place], [place] [address].

## 2018-09-16 NOTE — PATIENT INSTRUCTIONS
Recommendations from today's MTM visit:                                                    MTM (medication therapy management) is a service provided by a clinical pharmacist designed to help you get the most of out of your medicines.   Today we reviewed what your medicines are for, how to know if they are working, that your medicines are safe and how to make your medicine regimen as easy as possible.     1. Start Chantix as directed 1-2 weeks prior to your quit date.  Use the nicotine lozenges to manage cravings.    Next MTM visit: 9/19 at 10am, by PHONE.    To schedule another MTM appointment, please call the clinic directly or you may call the MTM scheduling line at [phone] or toll-free at [phone].     My Clinical Pharmacist's contact information:                                                      It was a pleasure seeing you today!  Please feel free to contact me with any questions or concerns you have.      Karol Quigley, PharmD, BCOP, BCPS  Clinical Pharmacy Specialist/  Oncology Medication Therapy Management Pharmacist  [facility]  Pager [phone]  Phone [phone]      You may receive a survey about the MTM services you received.  I would appreciate your feedback to help me serve you better in the future. Please fill it out and return it when you can. Your comments will be anonymous.

## 2018-09-16 NOTE — PROGRESS NOTES
SUBJECTIVE/OBJECTIVE:                           Yusra Rivas is a 63 year old female called for an initial visit for Medication Therapy Management.  She was referred to me from Graciela Rangel, Tobacco Treatment Specialist.      Chief Complaint: medication review and smoking cessation.    Allergies/ADRs: Reviewed in Epic  Tobacco: 0-1 pack per day - is interested in quittingTobacco Cessation Action Plan: Tobacco Treatment Program  Alcohol: rare    PMH: Reviewed in Epic    Medication Adherence/Access:  Patient takes medications directly from bottles, takes medication 2 times per day, she reports that she doesn't not miss messages.    Smoking Cessation: (please see note from Graciela Rangel, tobacco treatment specialist)  How much does she smoke:  1/2 ppd, does smoke within 30min of arising    Hypertension/afib: Current medications include metoprolol 50mg BID.  Patient does not self-monitor BP.  Patient reports no current medication side effects.    Back/hip pain:  Current medications include: gabapentin 900mg TID, Norco PRN (takes BID and 1 more dose PRN).  Patient states that the gabapentin does help, but the Norco helps for moderate pain, but not more than that.  She is enrolled in a pain management program.  She takes docusate 0-2 per day, but doesn't need the Miralax.  She denies other adverse effects.    Vitamins/supplements:  Current medications include: vitamin B12, eye vitamins, vitamin B6 and vitamin D 2000 units daily.  No problems reported.            ASSESSMENT:                             Current medications were reviewed today. Medicare Part D topics discussed:Medication changes    Medication Adherence: good, no issues identified    Smoking cessation: Needs Improvement. Pt is ready to quit using tobacco.  There is a potential drug interaction between bupropion and metoprolol.  Based on patient preferences and history, patient would benefit from nicotine lozenges and Chantix, one week prior to quit date.  Use,  benefit and potential side effects of Chantix and nicotine lozenges were discussed.    Hypertension: Stable. Patient is meeting BP goal of < 140/90mmHg. As nicotine can increase blood pressure and heart rate, I recommended checking BP/HR at home.    Back/hip pain: Stable. Patient would benefit from continuing treatment with her pain management provider.      Vitamins/supplements: Stable. No change needed.      PLAN:                            1.  Start Chantix 1-2 weeks prior to the quit date.  Use nicotine lozenges as needed for cravings.    I spent 30 minutes with this patient today (an extra 15 minutes was spent creating the Medication Action Plan). All changes were made via collaborative practice agreement with Dr Hernandez via Dr Stinson. A copy of the visit note was provided to the patient's primary care and referring provider.    Will follow up in 1 week.    The patient declined a summary of these recommendations as an after visit summary.     Karol Quigley, PharmD, BCOP, BCPS  Clinical Pharmacy Specialist/  Oncology Medication Therapy Management Pharmacist  [facility]  Pager [phone]  Phone [phone]

## 2018-09-19 ENCOUNTER — VIRTUAL VISIT (OUTPATIENT)
Dept: ONCOLOGY | Facility: CLINIC | Age: 63
End: 2018-09-19

## 2018-09-19 DIAGNOSIS — Z72.0 TOBACCO ABUSE DISORDER: Primary | ICD-10-CM

## 2018-09-19 NOTE — PROGRESS NOTES
TOBACCO TREATMENT FOLLOW UP VISIT    Subjective:      Patient is a 63 year old [place] female and was contacted to participate in Tobacco Treatment Program for Nicotine Dependence 9/19/2018 by the Tobacco Treatment Team   reports that she has been smoking Cigarettes.  She has been smoking about 0.50 packs per day. She has never used smokeless tobacco.     Patient completed MTM appt and received Chantix and 4mg NRT Lozenge. Reports not taking them yet. Plans to read through instructions today and prepare for a start date with the medication tomorrow. Continues to have days where she smokes 3CPD or the occasional day of not smoking at all, but when given the chance, still smoking 10CPD. Depends if she is watching her grandkids or not. Feels confident about quitting.        Information:      Agreed to Participate in Program - Yes    Session Number: 3            Proactive Outreach- Yes    Smoking Status:  Every Day Smoker    Has attempted to quit in the last 30 days:  No    Current Cessation Medication Being Used - 4mg Nicotine Lozenge and Chantix (Varenicline)    Withdrawal Symptoms: No withdrawal symptoms reported    Nicotine Product Used - Cigarettes    Amount of Use (CPD) - 10    Time to First Use -  < 30mins    Time of Last Cigarette: Smoked cigarette today (at least one puff)    Readiness (0-10) - 8    Barriers to quit- No Barriers       Summary of visit:      Yusra Rivas is still smoking/using tobacco: Yes  These MI interventions were used: Open-ended questions, Reflections: simple and complex and Change talk (evoked)  We discussed: Ways to cope with cravings and manage triggers  Hints for managing nicotine withdrawal  Ways to prepare for a quit date  Patient is ready to quit smoking or continue to stay 100% smoke-free.        Plan:      Quit Date: 9/20/18 Patient will start cessation medications. Will focus on staying busy and find ways to avoid boredom. Boredom is biggest trigger to smoke.     MTM Consult  Completed: Yes

## 2018-09-19 NOTE — MR AVS SNAPSHOT
After Visit Summary   9/19/2018    Yusra Rivas    MRN: [id number]           Patient Information     Date Of Birth          1955        Visit Information        Provider Department      9/19/2018 9:37 AM Specialist, [facility]        Today's Diagnoses     Tobacco abuse disorder    -  1       Follow-ups after your visit        Your next 10 appointments already scheduled     Sep 19, 2018 10:00 AM CDT   TELEMEDICINE with Karol Quigley RPH   [facility] Medication Therapy Management ([facility])    [address] [place] [address]  [address] [place] [id number] [phone]           Note: this is not an onsite visit; there is no need to come to the facility.            Oct 29, 2018  3:30 PM CDT   Return Visit with Antoine Narvaez MD   [facility] ([facility])    [id number] [place] [id number] [phone]            Mar 19, 2019  2:30 PM CDT   [facility] Visit with Sandra Ar[facility]o MD   [facility]    [address] [place] [id number] [phone]              Who to contact     If you have questions or need follow up information about today's clinic visit or your schedule please contact [facility] directly at [phone].  Normal or non-critical lab and imaging results will be communicated to you by MyChart, letter or phone within 4 business days after the clinic has received the results. If you do not hear from us within 7 days, please contact the clinic through MyChart or phone. If you have a critical or abnormal lab result, we will notify you by phone as soon as possible.  Submit refill requests through [employer] or call your pharmacy and they will forward the refill request to us. Please allow 3 business days for your refill to be completed.          Additional Information About Your Visit       "  MyChart Information     [employer] lets you send messages to your doctor, view your test results, renew your prescriptions, schedule appointments and more. To sign up, go to [email or web address] . Click on \"Log in\" on the left side of the screen, which will take you to the Welcome page. Then click on \"Sign up Now\" on the right side of the page.     You will be asked to enter the access code listed below, as well as some personal information. Please follow the directions to create your username and password.     Your access code is: [email or web address]  Expires: 2018  9:41 AM     Your access code will  in 90 days. If you need help or a new code, please call your [place] clinic or [phone].        Care EveryWhere ID     This is your Care EveryWhere ID. This could be used by other organizations to access your [place] medical records  [id number]         Blood Pressure from Last 3 Encounters:   18 111/62   18 134/73   18 109/56    Weight from Last 3 Encounters:   18 73.9 kg (163 lb)   18 72.9 kg (160 lb 11.2 oz)   18 71.7 kg (158 lb)              Today, you had the following     No orders found for display         Today's Medication Changes          These changes are accurate as of 18  9:41 AM.  If you have any questions, ask your nurse or doctor.               These medicines have changed or have updated prescriptions.        Dose/Directions     MG capsule   This may have changed:  See the new instructions.   Used for:  Slow transit constipation   Generic drug:  docusate sodium        TAKE ONE CAPSULE BY MOUTH EVERY DAY   Quantity:  100 capsule   Refills:  3                Primary Care Provider Office Phone # Fax #    Munir Kamaljit Lynn [phone]       [address] [place] [address]        Equal Access to Services     ROSEMARY EDUARDO AH: Luca garay Soomaali, waaxda luqadaha, qaybta kaalmada adeegyada, trisha hsu " juvencio tessori teaganelvis laazul ah. So [facility] [phone].    ATENCIÓN: Si tanyala [place], tiene a ruiz disposición servicios gratuitos de asistencia lingüística. Jean lyn [phone].    We comply with applicable federal civil rights laws and [place] laws. We do not discriminate on the basis of race, color, national origin, age, disability, sex, sexual orientation, or gender identity.            Thank you!     Thank you for choosing [facility]  for your care. Our goal is always to provide you with excellent care. Hearing back from our patients is one way we can continue to improve our services. Please take a few minutes to complete the written survey that you may receive in the mail after your visit with us. Thank you!             Your Updated Medication List - Protect others around you: Learn how to safely use, store and throw away your medicines at www.disposemymeds.org.          This list is accurate as of 9/19/18  9:41 AM.  Always use your most recent med list.                   Brand Name Dispense Instructions for use Diagnosis    cholecalciferol 1000 UNIT tablet    vitamin D3    200 tablet    TAKE TWO TABLETS BY MOUTH EVERY DAY    Routine general medical examination at a health care facility        MG capsule   Generic drug:  docusate sodium     100 capsule    TAKE ONE CAPSULE BY MOUTH EVERY DAY    Slow transit constipation       gabapentin 300 MG capsule    NEURONTIN    270 capsule    Take 3 capsules (900 mg) by mouth 3 times daily    Encounter for other specified aftercare       HYDROcodone-acetaminophen 5-325 MG per tablet    NORCO    75 tablet    Take 1 tablet by mouth every 6 hours as needed for pain Max of 3 tablets per day on bad days. This is a 30 day supply.  Dispense on/after 9/12/18.  To start on 9/14/18    Spasm, Neck pain       metoprolol tartrate 50 MG tablet    LOPRESSOR    180 tablet    TAKE ONE TABLET BY MOUTH TWICE A DAY    Hypertension goal BP (blood pressure) < 130/80        multivitamin Tabs per tablet     60 tablet    TAKE ONE TABLET BY MOUTH EVERY DAY    Routine history and physical examination of adult       nicotine polacrilex 4 MG lozenge     100 tablet    Dissolve 1 tablet orally directed, as needed for smoking cravings.    Encounter for tobacco use cessation counseling       polyethylene glycol powder    MIRALAX/GLYCOLAX    527 g    STIR 17 GM OF POWDER (SEE DEXTER INSIDE CAP) IN 8-OZ OF LIQUID UNTIL COMPLETELY DISSOLVED. DRINK THE SOLUTION DAILY OR AS DIRECTED.    Slow transit constipation       varenicline 0.5 MG X 11 & 1 MG X 42 tablet    CHANTIX STARTING MONTH BI    53 tablet    Take as directed per package instructions.    Encounter for tobacco use cessation counseling       VITAMIN B-12 PO      Take 1,000 mcg by mouth daily        VITAMIN B6 PO      Take 100 mg by mouth daily

## 2018-09-25 ENCOUNTER — ALLIED HEALTH/NURSE VISIT (OUTPATIENT)
Dept: PHARMACY | Facility: CLINIC | Age: 63
End: 2018-09-25
Payer: COMMERCIAL

## 2018-09-25 DIAGNOSIS — Z71.6 ENCOUNTER FOR TOBACCO USE CESSATION COUNSELING: Primary | ICD-10-CM

## 2018-09-25 PROCEDURE — 99606 MTMS BY PHARM EST 15 MIN: CPT | Mod: U4 | Performed by: PHARMACIST

## 2018-09-25 NOTE — PATIENT INSTRUCTIONS
Recommendations from today's MTM visit:                                                        1. No medication changes today.    Next MTM visit: 10/2/18 0900    To schedule another MTM appointment, please call the clinic directly or you may call the MTM scheduling line at [phone] or toll-free at [phone].     My Clinical Pharmacist's contact information:                                                      It was a pleasure seeing you today!  Please feel free to contact me with any questions or concerns you have.      Karol Quigley, PharmD, BCOP, BCPS  Clinical Pharmacy Specialist/  Oncology Medication Therapy Management Pharmacist  [facility]  Pager [phone]  Phone [phone]          You may receive a survey about the MTM services you received.  I would appreciate your feedback to help me serve you better in the future. Please fill it out and return it when you can. Your comments will be anonymous.

## 2018-09-25 NOTE — ADDENDUM NOTE
Encounter addended by: Ratna Foreman PT on: 9/25/2018 12:50 PM<BR>     Actions taken: Sign clinical note, Episode resolved

## 2018-09-25 NOTE — DISCHARGE SUMMARY
Outpatient Physical Therapy Discharge Note     Patient: Yusra Rivas  : 1955    Beginning/End Dates of Reporting Period:  One visit for evaluation on 18    Referring Provider: Dr. Antoine Narvaez     Therapy Diagnosis: Right hip pain     Client Self Report: see eval    Objective Measurements:  See evaluation. She was contacted by phone and stated she was planning to schedule for PT but had other issues to take care of first.   Goals:  Goal Identifier 1   Goal Description Patient will be able to walk with upright posture 90% of time in order to take her dogs for a walk.   Target Date 18   Date Met      Progress:     Goal Identifier 2   Goal Description Patient will report a decrease in right hip pain from 2-10/10 to 0-5/10 in order to perform housework.   Target Date 18   Date Met      Progress:   Progress Toward Goals:   Not assessed this period.    Plan:  Discharge from therapy.    Discharge:    Reason for Discharge: Patient has failed to schedule further appointments.    Equipment Issued: none    Discharge Plan: Did not continue with PT after evaluation so no home program established.     Thank you for referring Yusra  to [facility] - Jeff Foreman, PT  [phone]

## 2018-09-25 NOTE — MR AVS SNAPSHOT
After Visit Summary   9/25/2018    Yusra Rivas    MRN: [id number]           Patient Information     Date Of Birth          1955        Visit Information        Provider Department      9/25/2018 10:00 AM Karol Quigley RPH [facility] Medication Therapy Management        Today's Diagnoses     Encounter for tobacco use cessation counseling    -  1      Care Instructions    Recommendations from today's MTM visit:                                                        1. No medication changes today.    Next MTM visit: 10/2/18 0900    To schedule another MTM appointment, please call the clinic directly or you may call the MTM scheduling line at [phone] or toll-free at [phone].     My Clinical Pharmacist's contact information:                                                      It was a pleasure seeing you today!  Please feel free to contact me with any questions or concerns you have.      Karol Quigley, PharmD, BCOP, BCPS  Clinical Pharmacy Specialist/  Oncology Medication Therapy Management Pharmacist  [facility]  Pager [phone]  Phone [phone]          You may receive a survey about the MTM services you received.  I would appreciate your feedback to help me serve you better in the future. Please fill it out and return it when you can. Your comments will be anonymous.                  Follow-ups after your visit        Your next 10 appointments already scheduled     Oct 02, 2018  9:00 AM CDT   TELEMEDICINE with RODOLFO Dawn Medication Therapy Management ([facility])    [address]  [address]  [facility] [address] [phone]           Note: this is not an onsite visit; there is no need to come to the facility.            Oct 29, 2018  3:30 PM CDT   Return Visit with Antoine Narvaez MD   [facility] [place] ([facility])    [address] [address]  "  [phone]            Mar 19, 2019  2:30 PM CDT   Return Visit with Sandra Arroyo MD   [facility]    [address] [place] [address] [phone]              Who to contact     If you have questions or need follow up information about today's clinic visit or your schedule please contact [facility] MEDICATION THERAPY MANAGEMENT directly at [phone].  Normal or non-critical lab and imaging results will be communicated to you by [employer], letter or phone within 4 business days after the clinic has received the results. If you do not hear from us within 7 days, please contact the clinic through [employer] or phone. If you have a critical or abnormal lab result, we will notify you by phone as soon as possible.  Submit refill requests through [employer] or call your pharmacy and they will forward the refill request to us. Please allow 3 business days for your refill to be completed.          Additional Information About Your Visit        [employer] Information     [employer] lets you send messages to your doctor, view your test results, renew your prescriptions, schedule appointments and more. To sign up, go to [email or web address] . Click on \"Log in\" on the left side of the screen, which will take you to the Welcome page. Then click on \"Sign up Now\" on the right side of the page.     You will be asked to enter the access code listed below, as well as some personal information. Please follow the directions to create your username and password.     Your access code is: [email or web address]  Expires: 2018  9:41 AM     Your access code will  in 90 days. If you need help or a new code, please call your [place] clinic or [phone].        Care EveryWhere ID     This is your Care EveryWhere ID. This could be used by other organizations to access your [place] medical records  [id number]         Blood Pressure from Last 3 Encounters:   18 111/62 "   07/23/18 134/73   06/04/18 109/56    Weight from Last 3 Encounters:   07/23/18 163 lb (73.9 kg)   05/29/18 160 lb 11.2 oz (72.9 kg)   05/08/18 158 lb (71.7 kg)              Today, you had the following     No orders found for display         Today's Medication Changes          These changes are accurate as of 9/25/18  1:05 PM.  If you have any questions, ask your nurse or doctor.               These medicines have changed or have updated prescriptions.        Dose/Directions     MG capsule   This may have changed:  See the new instructions.   Used for:  Slow transit constipation   Generic drug:  docusate sodium        TAKE ONE CAPSULE BY MOUTH EVERY DAY   Quantity:  100 capsule   Refills:  3                Primary Care Provider Office Phone # Fax #    Munir Mari Shane,  [phone]       [address] [place] [address]        Equal Access to Services     [facility] : Hadii aad ku hadasho Soguy, waaxda luqadaha, qaybta kaalmada adeegyada, trisha price . So [facility] [phone].    ATENCIÓN: Si habla español, tiene a ruiz disposición servicios gratuitos de asistencia lingüística. Baileeame al [phone].    We comply with applicable federal civil rights laws and [place] laws. We do not discriminate on the basis of race, color, national origin, age, disability, sex, sexual orientation, or gender identity.            Thank you!     Thank you for choosing [facility] MEDICATION THERAPY MANAGEMENT  for your care. Our goal is always to provide you with excellent care. Hearing back from our patients is one way we can continue to improve our services. Please take a few minutes to complete the written survey that you may receive in the mail after your visit with us. Thank you!             Your Updated Medication List - Protect others around you: Learn how to safely use, store and throw away your medicines at www.disposemymeds.org.          This list is accurate  as of 9/25/18  1:05 PM.  Always use your most recent med list.                   Brand Name Dispense Instructions for use Diagnosis    cholecalciferol 1000 UNIT tablet    vitamin D3    200 tablet    TAKE TWO TABLETS BY MOUTH EVERY DAY    Routine general medical examination at a health care facility        MG capsule   Generic drug:  docusate sodium     100 capsule    TAKE ONE CAPSULE BY MOUTH EVERY DAY    Slow transit constipation       gabapentin 300 MG capsule    NEURONTIN    270 capsule    Take 3 capsules (900 mg) by mouth 3 times daily    Encounter for other specified aftercare       HYDROcodone-acetaminophen 5-325 MG per tablet    NORCO    75 tablet    Take 1 tablet by mouth every 6 hours as needed for pain Max of 3 tablets per day on bad days. This is a 30 day supply.  Dispense on/after 9/12/18.  To start on 9/14/18    Spasm, Neck pain       metoprolol tartrate 50 MG tablet    LOPRESSOR    180 tablet    TAKE ONE TABLET BY MOUTH TWICE A DAY    Hypertension goal BP (blood pressure) < 130/80       multivitamin Tabs per tablet     60 tablet    TAKE ONE TABLET BY MOUTH EVERY DAY    Routine history and physical examination of adult       nicotine polacrilex 4 MG lozenge     100 tablet    Dissolve 1 tablet orally directed, as needed for smoking cravings.    Encounter for tobacco use cessation counseling       polyethylene glycol powder    MIRALAX/GLYCOLAX    527 g    STIR 17 GM OF POWDER (SEE DEXTER INSIDE CAP) IN 8-OZ OF LIQUID UNTIL COMPLETELY DISSOLVED. DRINK THE SOLUTION DAILY OR AS DIRECTED.    Slow transit constipation       varenicline 0.5 MG X 11 & 1 MG X 42 tablet    CHANTIX STARTING MONTH BI    53 tablet    Take as directed per package instructions.    Encounter for tobacco use cessation counseling       VITAMIN B-12 PO      Take 1,000 mcg by mouth daily        VITAMIN B6 PO      Take 100 mg by mouth daily

## 2018-09-25 NOTE — PROGRESS NOTES
"SUBJECTIVE/OBJECTIVE:                Yusra Rivas is a 63 year old female called for a follow-up visit for Medication Therapy Management.  She was referred to me from Graciela Rangel, Tobacco Treatment Specialist.     Chief Complaint: Follow up from our visit on 9/12/18  Personal Healthcare Goals: stop smoking  Tobacco: 0-1 pack per day - is interested in quittingTobacco Cessation Action Plan: enrolled in clinic program  Alcohol: rare    Smoking cessation:  Patient reports things going well.  She did get the Chantix prescription filled and started it on 9/20 or 9/21/18.  Her current dose is 0.5mg BID. She has not missed any doses.  She denies side effects: \"It's like I'm not taking anything at all\".  Specifically, she denies insomnia/abnormal dreams and nausea.  She has not yet determined a Quit Date, but she expects it to do it in about 2 weeks.      ASSESSMENT:              Current medications were reviewed today as discussed above.  Medicare Part D topics discussed:Medications reviewed-no issues identified or changes needed    Medication Adherence: good, no issues identified    Smoking cessation: Stable. Patient is tolerating the Chantix and expects to set a Quit Date soon.       PLAN:                  No change at this time.    I spent 15 minutes with this patient today. A copy of the visit note was provided to the patient's primary care provider.     Will follow up in one week to see if she has side effects at the full dose.    The patient declined a summary of these recommendations as an after visit summary.        Karol Quigley, PharmD, BCOP, BCPS  Clinical Pharmacy Specialist/  Oncology Medication Therapy Management Pharmacist  [facility]  Pager [phone]  Phone [phone]        "

## 2018-10-02 ENCOUNTER — ALLIED HEALTH/NURSE VISIT (OUTPATIENT)
Dept: PHARMACY | Facility: CLINIC | Age: 63
End: 2018-10-02
Payer: COMMERCIAL

## 2018-10-02 DIAGNOSIS — Z71.6 ENCOUNTER FOR TOBACCO USE CESSATION COUNSELING: Primary | ICD-10-CM

## 2018-10-02 PROCEDURE — 99606 MTMS BY PHARM EST 15 MIN: CPT | Mod: U4 | Performed by: PHARMACIST

## 2018-10-02 NOTE — PATIENT INSTRUCTIONS
Recommendations from today's MTM visit:                                                    MTM (medication therapy management) is a service provided by a clinical pharmacist designed to help you get the most of out of your medicines.   Today we reviewed what your medicines are for, how to know if they are working, that your medicines are safe and how to make your medicine regimen as easy as possible.     1. Take the Chantix with a full meal and a full glass of water.    Next MTM visit: 4-8 weeks    To schedule another MTM appointment, please call the clinic directly or you may call the MTM scheduling line at [phone] or toll-free at [phone].     My Clinical Pharmacist's contact information:                                                      It was a pleasure seeing you today!  Please feel free to contact me with any questions or concerns you have.      Karol Quigley, PharmD, BCOP, BCPS  Clinical Pharmacy Specialist/  Oncology Medication Therapy Management Pharmacist  [facility]  Pager [phone]  Phone [phone]          You may receive a survey about the MTM services you received.  I would appreciate your feedback to help me serve you better in the future. Please fill it out and return it when you can. Your comments will be anonymous.

## 2018-10-02 NOTE — MR AVS SNAPSHOT
After Visit Summary   10/2/2018    Yusra Rivas    MRN: [id number]           Patient Information     Date Of Birth          1955        Visit Information        Provider Department      10/2/2018 9:00 AM [facility]        Today's Diagnoses     Encounter for tobacco use cessation counseling    -  1      Care Instructions    Recommendations from today's MTM visit:                                                    MTM (medication therapy management) is a service provided by a clinical pharmacist designed to help you get the most of out of your medicines.   Today we reviewed what your medicines are for, how to know if they are working, that your medicines are safe and how to make your medicine regimen as easy as possible.     1. Take the Chantix with a full meal and a full glass of water.    Next MTM visit: 4-8 weeks    To schedule another MTM appointment, please call the clinic directly or you may call the MTM scheduling line at [phone] or toll-free at [phone].     My Clinical Pharmacist's contact information:                                                      It was a pleasure seeing you today!  Please feel free to contact me with any questions or concerns you have.      Karol Quigley, PharmD, BCOP, BCPS  Clinical Pharmacy Specialist/  Oncology Medication Therapy Management Pharmacist  [facility]  Pager [phone]  Phone [phone]          You may receive a survey about the MTM services you received.  I would appreciate your feedback to help me serve you better in the future. Please fill it out and return it when you can. Your comments will be anonymous.                  Follow-ups after your visit        Your next 10 appointments already scheduled     Oct 29, 2018  3:30 PM CDT   Return Visit with Antoine Narvaez MD   [facility] [place] ([facility] [place])    [address]  [place]  "[phone]   [phone]            Mar 19, 2019  2:30 PM CDT   Return Visit with Sandra Arroyo MD   [facility]    [address] [place] [address] [phone]              Who to contact     If you have questions or need follow up information about today's clinic visit or your schedule please contact [facility] MEDICATION THERAPY MANAGEMENT directly at [phone].  Normal or non-critical lab and imaging results will be communicated to you by MyChart, letter or phone within 4 business days after the clinic has received the results. If you do not hear from us within 7 days, please contact the clinic through [employer] or phone. If you have a critical or abnormal lab result, we will notify you by phone as soon as possible.  Submit refill requests through [employer] or call your pharmacy and they will forward the refill request to us. Please allow 3 business days for your refill to be completed.          Additional Information About Your Visit        [employer] Information     [employer] lets you send messages to your doctor, view your test results, renew your prescriptions, schedule appointments and more. To sign up, go to [email or web address] . Click on \"Log in\" on the left side of the screen, which will take you to the Welcome page. Then click on \"Sign up Now\" on the right side of the page.     You will be asked to enter the access code listed below, as well as some personal information. Please follow the directions to create your username and password.     Your access code is: [email or web address]  Expires: 2018  9:41 AM     Your access code will  in 90 days. If you need help or a new code, please call your [place] clinic or [phone].        Care EveryWhere ID     This is your Care EveryWhere ID. This could be used by other organizations to access your [place] medical records  [id number]         Blood Pressure from Last 3 Encounters:   18 " 111/62   07/23/18 134/73   06/04/18 109/56    Weight from Last 3 Encounters:   07/23/18 163 lb (73.9 kg)   05/29/18 160 lb 11.2 oz (72.9 kg)   05/08/18 158 lb (71.7 kg)              Today, you had the following     No orders found for display         Today's Medication Changes          These changes are accurate as of 10/2/18 10:14 AM.  If you have any questions, ask your nurse or doctor.               These medicines have changed or have updated prescriptions.        Dose/Directions     MG capsule   This may have changed:  See the new instructions.   Used for:  Slow transit constipation   Generic drug:  docusate sodium        TAKE ONE CAPSULE BY MOUTH EVERY DAY   Quantity:  100 capsule   Refills:  3                Primary Care Provider Office Phone # Fax #    Munir Mari Shane,  [phone]       [address] [place] [address]        Equal Access to Services     [facility] : Hadii jeff holdeno Soguy, waaxda luqadaha, qaybta kaalmada adeegyada, trisha price . So [facility] [phone].    ATENCIÓN: Si habla español, tiene a ruiz disposición servicios gratuitos de asistencia lingüística. Llame al [phone].    We comply with applicable federal civil rights laws and [place] laws. We do not discriminate on the basis of race, color, national origin, age, disability, sex, sexual orientation, or gender identity.            Thank you!     Thank you for choosing [facility] MEDICATION THERAPY MANAGEMENT  for your care. Our goal is always to provide you with excellent care. Hearing back from our patients is one way we can continue to improve our services. Please take a few minutes to complete the written survey that you may receive in the mail after your visit with us. Thank you!             Your Updated Medication List - Protect others around you: Learn how to safely use, store and throw away your medicines at www.disposemymeds.org.          This list is  accurate as of 10/2/18 10:14 AM.  Always use your most recent med list.                   Brand Name Dispense Instructions for use Diagnosis    cholecalciferol 1000 UNIT tablet    vitamin D3    200 tablet    TAKE TWO TABLETS BY MOUTH EVERY DAY    Routine general medical examination at a health care facility        MG capsule   Generic drug:  docusate sodium     100 capsule    TAKE ONE CAPSULE BY MOUTH EVERY DAY    Slow transit constipation       gabapentin 300 MG capsule    NEURONTIN    270 capsule    Take 3 capsules (900 mg) by mouth 3 times daily    Encounter for other specified aftercare       HYDROcodone-acetaminophen 5-325 MG per tablet    NORCO    75 tablet    Take 1 tablet by mouth every 6 hours as needed for pain Max of 3 tablets per day on bad days. This is a 30 day supply.  Dispense on/after 9/12/18.  To start on 9/14/18    Spasm, Neck pain       metoprolol tartrate 50 MG tablet    LOPRESSOR    180 tablet    TAKE ONE TABLET BY MOUTH TWICE A DAY    Hypertension goal BP (blood pressure) < 130/80       multivitamin Tabs per tablet     60 tablet    TAKE ONE TABLET BY MOUTH EVERY DAY    Routine history and physical examination of adult       nicotine polacrilex 4 MG lozenge     100 tablet    Dissolve 1 tablet orally directed, as needed for smoking cravings.    Encounter for tobacco use cessation counseling       polyethylene glycol powder    MIRALAX/GLYCOLAX    527 g    STIR 17 GM OF POWDER (SEE DEXTER INSIDE CAP) IN 8-OZ OF LIQUID UNTIL COMPLETELY DISSOLVED. DRINK THE SOLUTION DAILY OR AS DIRECTED.    Slow transit constipation       varenicline 0.5 MG X 11 & 1 MG X 42 tablet    CHANTIX STARTING MONTH BI    53 tablet    Take as directed per package instructions.    Encounter for tobacco use cessation counseling       VITAMIN B-12 PO      Take 1,000 mcg by mouth daily        VITAMIN B6 PO      Take 100 mg by mouth daily

## 2018-10-03 ENCOUNTER — VIRTUAL VISIT (OUTPATIENT)
Dept: ONCOLOGY | Facility: CLINIC | Age: 63
End: 2018-10-03

## 2018-10-03 DIAGNOSIS — R25.2 SPASM: ICD-10-CM

## 2018-10-03 DIAGNOSIS — M54.2 NECK PAIN: ICD-10-CM

## 2018-10-03 DIAGNOSIS — Z72.0 TOBACCO ABUSE DISORDER: Primary | ICD-10-CM

## 2018-10-03 NOTE — TELEPHONE ENCOUNTER
Received call from patient requesting refill(s) of HYDROcodone-acetaminophen (NORCO) 5-325 MG per tablet    Last picked up from pharmacy on 09/15/18    Pt last seen by prescribing provider on 07/25/18  Next appt scheduled for 10/29/18     checked in the past 6 months? Yes If no, print current report and give to RN    Last urine drug screen date 05/08/18  Current opioid agreement on file (completed within the last year) Yes Date of opioid agreement: 07/23/18    Processing (pick one and delete the others):  Per patient's request from initial note in encounter, I did call her back. She just wanted to make sure we knew where to mail the Rx    Deliver Rx to [facility]  [address] [facility] [address]    Will route to nursing pool for review and preparation of prescription(s).

## 2018-10-03 NOTE — TELEPHONE ENCOUNTER
VM left today at: 1:39 pm      Reason for call:  Medication   If this is a refill request, has the caller requested the refill from the pharmacy already? No  Will the patient be using a [facility]? Yes  Name of the pharmacy and phone number for the current request: [facility] in [place]     Name of the medication requested: HYDROcodone-acetaminophen (NORCO) 5-325 MG per tablet     Pt is requesting a call back.     Phone number to reach patient:  Home number on file [phone] (home)        Can we leave a detailed message on this number?  YES       Melida ROCA    [facility]

## 2018-10-03 NOTE — TELEPHONE ENCOUNTER
Medication refill information reviewed.     Due date for HYDROcodone-acetaminophen (NORCO) 5-325 MG per tablet is 10/15/18     Prescriptions prepped for review.     Will route to provider.     Deliver Rx to [facility]  [address] [place] [id number]      Danis Luz, RN  Care Coordinator   [facility]

## 2018-10-03 NOTE — PROGRESS NOTES
TOBACCO TREATMENT FOLLOW UP VISIT    Subjective:      Patient is a 63 year old White female and was contacted to participate in Tobacco Treatment Program for Nicotine Dependence 10/3/2018 by the Tobacco Treatment Team. Patient  reports that she has been smoking Cigarettes.  She has been smoking about 0.50 packs per day. She has never used smokeless tobacco.    Patient has been using Chantix for the last 2 weeks and reports things are working well. Some nausea, but doesn't think it's necessarily related to the Chantix, hasn't been feeling well in general. Low energy. Has been able to reduce to 6CPD daily. Reports just not having any cravings. Smoking out of boredom. Biggest challenge is having things to do to stay busy. Remains comitted to quit smoking for improved health.        Information:      Agreed to Participate in Program - Yes    Session Number: 4    Proactive Outreach- Yes    Smoking Status:  Every Day Smoker    Has attempted to quit in the last 30 days:  Yes    Current Cessation Medication Being Used - Chantix (Varenicline)    Withdrawal Symptoms: No withdrawal symptoms reported    Nicotine Product Used - Cigarettes    Amount of Use (CPD) - 6    Time to First Use -  > 30mins    Time of Last Cigarette: Smoked cigarette today (at least one puff)    Readiness (0-10) - 8    Barriers to quit- No Barriers       Summary of visit:      Yusra Rivas is still smoking/using tobacco: Yes  These MI interventions were used: Open-ended questions, Reflections: simple and complex and Change talk (evoked)  We discussed: Benefits of quitting  Ways to prepare for a quit date  Patient is ready to quit smoking or continue to stay 100% smoke-free.        Plan:      Patient will continue to take Chantix daily and prepare for her quit date in 1 week by finding things she can do to stay busy/distracted from smoking.     MTM Consult Completed: Yes

## 2018-10-03 NOTE — MR AVS SNAPSHOT
"              After Visit Summary   10/3/2018    Yusra Rivas    MRN: [id number]           Patient Information     Date Of Birth          1955        Visit Information        Provider Department      10/3/2018 9:20 AM Specialist, [facility]        Today's Diagnoses     Tobacco abuse disorder    -  1       Follow-ups after your visit        Your next 10 appointments already scheduled     Oct 29, 2018  3:30 PM CDT   Return Visit with Antoine Narvaez MD   [facility] ([facility])    [address] [address] [phone]            Mar 19, 2019  2:30 PM CDT   Return Visit with Sandra Arroyo MD   [facility]    [address] [address] [phone]              Who to contact     If you have questions or need follow up information about today's clinic visit or your schedule please contact [facility] directly at [phone].  Normal or non-critical lab and imaging results will be communicated to you by MyChart, letter or phone within 4 business days after the clinic has received the results. If you do not hear from us within 7 days, please contact the clinic through MyChart or phone. If you have a critical or abnormal lab result, we will notify you by phone as soon as possible.  Submit refill requests through Project Playlist or call your pharmacy and they will forward the refill request to us. Please allow 3 business days for your refill to be completed.          Additional Information About Your Visit        Azurohart Information     Project Playlist lets you send messages to your doctor, view your test results, renew your prescriptions, schedule appointments and more. To sign up, go to [email or web address] . Click on \"Log in\" on the left side of the screen, which will take you to the Welcome page. Then click on \"Sign up Now\" on the " right side of the page.     You will be asked to enter the access code listed below, as well as some personal information. Please follow the directions to create your username and password.     Your access code is: [email or web address]  Expires: 2018  9:41 AM     Your access code will  in 90 days. If you need help or a new code, please call your [place] clinic or [phone].        Care EveryWhere ID     This is your Care EveryWhere ID. This could be used by other organizations to access your [place] medical records  [id number]         Blood Pressure from Last 3 Encounters:   18 111/62   18 134/73   18 109/56    Weight from Last 3 Encounters:   18 73.9 kg (163 lb)   18 72.9 kg (160 lb 11.2 oz)   18 71.7 kg (158 lb)              Today, you had the following     No orders found for display         Today's Medication Changes          These changes are accurate as of 10/3/18  9:24 AM.  If you have any questions, ask your nurse or doctor.               These medicines have changed or have updated prescriptions.        Dose/Directions     MG capsule   This may have changed:  See the new instructions.   Used for:  Slow transit constipation   Generic drug:  docusate sodium        TAKE ONE CAPSULE BY MOUTH EVERY DAY   Quantity:  100 capsule   Refills:  3                Primary Care Provider Office Phone # Fax #    Munir Kamaljit Shane,  [phone]       [address] [place] [address] [address] [place] [address]        Equal Access to Services     [facility] AH: Hadii jeff ku hadasho Soomaali, waaxda luqadaha, qaybta kaalmada adeegyada, trisha wilson. So [facility] [phone].    ATENCIÓN: Si habla español, tiene a ruiz disposición servicios gratuitos de asistencia lingüística. Llame al [phone].    We comply with applicable federal civil rights laws and [place] laws. We do not discriminate on the basis of race, color, national origin, age,  disability, sex, sexual orientation, or gender identity.            Thank you!     Thank you for choosing [facility]  for your care. Our goal is always to provide you with excellent care. Hearing back from our patients is one way we can continue to improve our services. Please take a few minutes to complete the written survey that you may receive in the mail after your visit with us. Thank you!             Your Updated Medication List - Protect others around you: Learn how to safely use, store and throw away your medicines at www.disposemymeds.org.          This list is accurate as of 10/3/18  9:24 AM.  Always use your most recent med list.                   Brand Name Dispense Instructions for use Diagnosis    cholecalciferol 1000 UNIT tablet    vitamin D3    200 tablet    TAKE TWO TABLETS BY MOUTH EVERY DAY    Routine general medical examination at a health care facility        MG capsule   Generic drug:  docusate sodium     100 capsule    TAKE ONE CAPSULE BY MOUTH EVERY DAY    Slow transit constipation       gabapentin 300 MG capsule    NEURONTIN    270 capsule    Take 3 capsules (900 mg) by mouth 3 times daily    Encounter for other specified aftercare       HYDROcodone-acetaminophen 5-325 MG per tablet    NORCO    75 tablet    Take 1 tablet by mouth every 6 hours as needed for pain Max of 3 tablets per day on bad days. This is a 30 day supply.  Dispense on/after 9/12/18.  To start on 9/14/18    Spasm, Neck pain       metoprolol tartrate 50 MG tablet    LOPRESSOR    180 tablet    TAKE ONE TABLET BY MOUTH TWICE A DAY    Hypertension goal BP (blood pressure) < 130/80       multivitamin Tabs per tablet     60 tablet    TAKE ONE TABLET BY MOUTH EVERY DAY    Routine history and physical examination of adult       nicotine polacrilex 4 MG lozenge     100 tablet    Dissolve 1 tablet orally directed, as needed for smoking cravings.    Encounter for tobacco use cessation counseling        polyethylene glycol powder    MIRALAX/GLYCOLAX    527 g    STIR 17 GM OF POWDER (SEE DEXTER INSIDE CAP) IN 8-OZ OF LIQUID UNTIL COMPLETELY DISSOLVED. DRINK THE SOLUTION DAILY OR AS DIRECTED.    Slow transit constipation       varenicline 0.5 MG X 11 & 1 MG X 42 tablet    CHANTIX STARTING MONTH BI    53 tablet    Take as directed per package instructions.    Encounter for tobacco use cessation counseling       VITAMIN B-12 PO      Take 1,000 mcg by mouth daily        VITAMIN B6 PO      Take 100 mg by mouth daily

## 2018-10-04 RX ORDER — HYDROCODONE BITARTRATE AND ACETAMINOPHEN 5; 325 MG/1; MG/1
1 TABLET ORAL EVERY 6 HOURS PRN
Qty: 75 TABLET | Refills: 0 | Status: SHIPPED | OUTPATIENT
Start: 2018-10-04 | End: 2018-10-29

## 2018-10-04 NOTE — TELEPHONE ENCOUNTER
Signed Rx mailing from [place] on 10/05/18. Mailing to [facility]. Patient was informed.

## 2018-10-04 NOTE — TELEPHONE ENCOUNTER
Signed Prescriptions:                        Disp   Refills    HYDROcodone-acetaminophen (NORCO) 5-325 MG*75 tab*0        Sig: Take 1 tablet by mouth every 6 hours as needed for           pain Max of 3 tablets per day on bad days. This           is a 30 day supply.  Dispense on/after 10/12/18.            To start on 10/15/18  Authorizing Provider: ANTOINE SU    Reviewed, printed, signed in [place].  Given to MA for mailing to pharmacy.    Antoine Narvaez MD  [facility]

## 2018-10-18 DIAGNOSIS — Z72.0 TOBACCO ABUSE: ICD-10-CM

## 2018-10-18 DIAGNOSIS — Z17.1 MALIGNANT NEOPLASM OF OVERLAPPING SITES OF RIGHT BREAST IN FEMALE, ESTROGEN RECEPTOR NEGATIVE (H): Primary | ICD-10-CM

## 2018-10-18 DIAGNOSIS — C34.90 MALIGNANT NEOPLASM OF LUNG, UNSPECIFIED LATERALITY, UNSPECIFIED PART OF LUNG (H): ICD-10-CM

## 2018-10-18 DIAGNOSIS — C50.811 MALIGNANT NEOPLASM OF OVERLAPPING SITES OF RIGHT BREAST IN FEMALE, ESTROGEN RECEPTOR NEGATIVE (H): Primary | ICD-10-CM

## 2018-10-18 RX ORDER — VARENICLINE TARTRATE 1 MG/1
1 TABLET, FILM COATED ORAL 2 TIMES DAILY
Qty: 60 TABLET | Refills: 1 | Status: SHIPPED | OUTPATIENT
Start: 2018-10-18 | End: 2018-11-29

## 2018-10-24 ENCOUNTER — VIRTUAL VISIT (OUTPATIENT)
Dept: ONCOLOGY | Facility: CLINIC | Age: 63
End: 2018-10-24
Attending: PSYCHOLOGIST
Payer: MEDICARE

## 2018-10-24 DIAGNOSIS — Z72.0 TOBACCO ABUSE DISORDER: Primary | ICD-10-CM

## 2018-10-24 PROCEDURE — 40000114 ZZH STATISTIC NO CHARGE CLINIC VISIT

## 2018-10-24 NOTE — MR AVS SNAPSHOT
"              After Visit Summary   10/24/2018    Yusra Rivas    MRN: [id number]           Patient Information     Date Of Birth          1955        Visit Information        Provider Department      10/24/2018 9:00 AM Specialist, [facility]        Today's Diagnoses     Tobacco abuse disorder    -  1       Follow-ups after your visit        Your next 10 appointments already scheduled     Oct 29, 2018  3:30 PM CDT   Return Visit with Antoine Narvaez MD   [facility] ([facility])    [address] [address] [phone]            Mar 19, 2019  2:30 PM CDT   Return Visit with Sandra Arroyo MD   [facility]    [address] [address] [phone]              Who to contact     If you have questions or need follow up information about today's clinic visit or your schedule please contact [facility] directly at [phone].  Normal or non-critical lab and imaging results will be communicated to you by MyChart, letter or phone within 4 business days after the clinic has received the results. If you do not hear from us within 7 days, please contact the clinic through MyChart or phone. If you have a critical or abnormal lab result, we will notify you by phone as soon as possible.  Submit refill requests through [email or web address] or call your pharmacy and they will forward the refill request to us. Please allow 3 business days for your refill to be completed.          Additional Information About Your Visit        Tela Innovationshart Information     [email or web address] lets you send messages to your doctor, view your test results, renew your prescriptions, schedule appointments and more. To sign up, go to [email or web address] . Click on \"Log in\" on the left side of the screen, which will take you to the Welcome page. Then click on \"Sign up Now\" on " the right side of the page.     You will be asked to enter the access code listed below, as well as some personal information. Please follow the directions to create your username and password.     Your access code is: [email or web address]  Expires: 2018  9:41 AM     Your access code will  in 90 days. If you need help or a new code, please call your [place] clinic or [phone].        Care EveryWhere ID     This is your Care EveryWhere ID. This could be used by other organizations to access your [place] medical records  [id number]         Blood Pressure from Last 3 Encounters:   18 111/62   18 134/73   18 109/56    Weight from Last 3 Encounters:   18 73.9 kg (163 lb)   18 72.9 kg (160 lb 11.2 oz)   18 71.7 kg (158 lb)              Today, you had the following     No orders found for display         Today's Medication Changes          These changes are accurate as of 10/24/18  9:19 AM.  If you have any questions, ask your nurse or doctor.               These medicines have changed or have updated prescriptions.        Dose/Directions     MG capsule   This may have changed:  See the new instructions.   Used for:  Slow transit constipation   Generic drug:  docusate sodium        TAKE ONE CAPSULE BY MOUTH EVERY DAY   Quantity:  100 capsule   Refills:  3                Primary Care Provider Office Phone # Fax #    Munir Kamaljit Shane,  [phone]       [address] [place] [address] [address] [place] [address]        Equal Access to Services     [facility] AH: Hadii jeff ku hadasho Soomaali, waaxda luqadaha, qaybta kaalmada adeegyada, trisha wilson. So [facility] [phone].    ATENCIÓN: Si habla español, tiene a ruiz disposición servicios gratuitos de asistencia lingüística. Llame al [phone].    We comply with applicable federal civil rights laws and [place] laws. We do not discriminate on the basis of race, color, national origin, age,  disability, sex, sexual orientation, or gender identity.            Thank you!     Thank you for choosing [facility]  for your care. Our goal is always to provide you with excellent care. Hearing back from our patients is one way we can continue to improve our services. Please take a few minutes to complete the written survey that you may receive in the mail after your visit with us. Thank you!             Your Updated Medication List - Protect others around you: Learn how to safely use, store and throw away your medicines at www.disposemymeds.org.          This list is accurate as of 10/24/18  9:19 AM.  Always use your most recent med list.                   Brand Name Dispense Instructions for use Diagnosis    cholecalciferol 1000 UNIT tablet    vitamin D3    200 tablet    TAKE TWO TABLETS BY MOUTH EVERY DAY    Routine general medical examination at a health care facility        MG capsule   Generic drug:  docusate sodium     100 capsule    TAKE ONE CAPSULE BY MOUTH EVERY DAY    Slow transit constipation       gabapentin 300 MG capsule    NEURONTIN    270 capsule    Take 3 capsules (900 mg) by mouth 3 times daily    Encounter for other specified aftercare       HYDROcodone-acetaminophen 5-325 MG per tablet    NORCO    75 tablet    Take 1 tablet by mouth every 6 hours as needed for pain Max of 3 tablets per day on bad days. This is a 30 day supply.  Dispense on/after 10/12/18.  To start on 10/15/18    Spasm, Neck pain       metoprolol tartrate 50 MG tablet    LOPRESSOR    180 tablet    TAKE ONE TABLET BY MOUTH TWICE A DAY    Hypertension goal BP (blood pressure) < 130/80       multivitamin Tabs per tablet     60 tablet    TAKE ONE TABLET BY MOUTH EVERY DAY    Routine history and physical examination of adult       nicotine polacrilex 4 MG lozenge     100 tablet    Dissolve 1 tablet orally directed, as needed for smoking cravings.    Encounter for tobacco use cessation counseling        polyethylene glycol powder    MIRALAX/GLYCOLAX    527 g    STIR 17 GM OF POWDER (SEE DEXTER INSIDE CAP) IN 8-OZ OF LIQUID UNTIL COMPLETELY DISSOLVED. DRINK THE SOLUTION DAILY OR AS DIRECTED.    Slow transit constipation       varenicline 1 MG tablet    CHANTIX CONTINUING MONTH BI    60 tablet    Take 1 tablet (1 mg) by mouth 2 times daily Take 1 tablet by mouth twice daily.    Malignant neoplasm of overlapping sites of right breast in female, estrogen receptor negative (H), Malignant neoplasm of lung, unspecified laterality, unspecified part of lung (H), Tobacco abuse       VITAMIN B-12 PO      Take 1,000 mcg by mouth daily        VITAMIN B6 PO      Take 100 mg by mouth daily

## 2018-10-24 NOTE — PROGRESS NOTES
TOBACCO TREATMENT FOLLOW UP VISIT    Subjective:      Patient is a 63 year old White female and was contacted to participate in Tobacco Treatment Program for Nicotine Dependence 10/24/2018 by the Tobacco Treatment Team. Patient  reports that she has been smoking Cigarettes.  She has been smoking about 0.50 packs per day. She has never used smokeless tobacco.     Patient has been smoke free over the last week. Reports the Chantix is working well. Cigarettes weren't tasting the same anymore and had no effect for her. Has noticed a change in how her morning coffee tastes and no longer likes that as well. Has no interest in smoking anymore. Isn't bothered by relaxing with her dogs at the end of the day when she used to smoke in bed.     No barriers to remain tobacco free. Patient feels confident and excited about not smoking.        Information:      Agreed to Participate in Program - Yes    Session Number: 5    Proactive Outreach- Yes    Smoking Status:  Not at all    Has attempted to quit in the last 30 days:  Yes    Current Cessation Medication Being Used - Chantix (Varenicline)    Withdrawal Symptoms: No withdrawal symptoms reported    Nicotine Product Used - Cigarettes    Amount of Use (CPD) - 0    Time to First Use -  Not currently smoking     Time of Last Cigarette: 1 to 7 days ago    Readiness (0-10) - 8    Barriers to quit- No Barriers       Summary of visit:      Yusra Rivas is still smoking/using tobacco: No  These MI interventions were used: Supported Autonomy, Collaboration, Evocation, Open-ended questions, Reflections: simple and complex and Change talk (evoked)  We discussed: Ways to cope with cravings and manage triggers  Patient is ready to quit smoking or continue to stay 100% smoke-free.        Plan:      Patient will continue to take Chantix daily and use NRT lozenge PRN.     MTM Consult Completed: Yes

## 2018-10-29 ENCOUNTER — OFFICE VISIT (OUTPATIENT)
Dept: PALLIATIVE MEDICINE | Facility: CLINIC | Age: 63
End: 2018-10-29
Payer: MEDICARE

## 2018-10-29 VITALS
DIASTOLIC BLOOD PRESSURE: 79 MMHG | HEART RATE: 73 BPM | BODY MASS INDEX: 26.47 KG/M2 | SYSTOLIC BLOOD PRESSURE: 143 MMHG | WEIGHT: 169 LBS

## 2018-10-29 DIAGNOSIS — Z51.89 ENCOUNTER FOR OTHER SPECIFIED AFTERCARE: ICD-10-CM

## 2018-10-29 DIAGNOSIS — Z98.1 HISTORY OF LUMBAR FUSION: ICD-10-CM

## 2018-10-29 DIAGNOSIS — F41.1 GENERALIZED ANXIETY DISORDER: ICD-10-CM

## 2018-10-29 DIAGNOSIS — G89.4 CHRONIC PAIN DISORDER: ICD-10-CM

## 2018-10-29 DIAGNOSIS — M54.2 NECK PAIN: ICD-10-CM

## 2018-10-29 DIAGNOSIS — G62.0 POLYNEUROPATHY DUE TO DRUGS (H): ICD-10-CM

## 2018-10-29 DIAGNOSIS — R25.2 SPASM: ICD-10-CM

## 2018-10-29 DIAGNOSIS — M54.16 LUMBAR RADICULOPATHY: Primary | ICD-10-CM

## 2018-10-29 DIAGNOSIS — G89.4 CHRONIC PAIN SYNDROME: ICD-10-CM

## 2018-10-29 PROCEDURE — 99214 OFFICE O/P EST MOD 30 MIN: CPT | Performed by: ANESTHESIOLOGY

## 2018-10-29 RX ORDER — HYDROCODONE BITARTRATE AND ACETAMINOPHEN 5; 325 MG/1; MG/1
1 TABLET ORAL EVERY 6 HOURS PRN
Qty: 75 TABLET | Refills: 0 | Status: SHIPPED | OUTPATIENT
Start: 2018-10-29 | End: 2018-12-17

## 2018-10-29 RX ORDER — GABAPENTIN 300 MG/1
900 CAPSULE ORAL 3 TIMES DAILY
Qty: 270 CAPSULE | Refills: 3 | Status: SHIPPED | OUTPATIENT
Start: 2018-10-29 | End: 2019-03-18

## 2018-10-29 ASSESSMENT — PAIN SCALES - GENERAL: PAINLEVEL: MODERATE PAIN (5)

## 2018-10-29 NOTE — PROGRESS NOTES
[facility]    Date of visit: 10/29/2018    Chief complaint:   Chief Complaint   Patient presents with     Pain       Interval history:  Yusra Rivas was last seen by me on 7/25/18 for right hip bursa injection and lumbar TPI.  Her last office visit was on 7/23/18.      Recommendations/plan at the last visit included:  Plan:  1. Physical Therapy:  Resume physical therapy when able, continue self directed exercise as tolerated  2. Clinical Health Psychologist to address issues of relaxation, behavioral change, coping style, and other factors important to improvement.  deferred  3. Diagnostic Studies:    1. Right hip x-rays  2. Updated lumbar spine MRI ordered today  4. Medication Management:    1. Continue Norco 5/325 2-3 per day as needed for pain  2. Continue Gabapentin 900 mg TID  3. Continue Flexeril or Robaxin as needed for muscle spasm  5. Further procedures recommended:   1. Consider lumbar epidural steroid injection - will determine levels after updated MRI  2. Lumbar trigger point injections at next available  3. Consider repeat right hip joint injection depending on x-rays  6. Recommendations to PCP: continue current treatment  7. Smoking cessation and smoking effects on healing and chronic pain discussed with patient   8. Follow up: 3 months and for injections     Since her last visit, Yusra Rivas reports:  She continues with pain in the right thigh and leg as well as pain around the left ankle.  She complains of burning and cold sensation on the side of both legs that seems to start just above the knees and goes into the feet.  This usually comes on when she is lying in bed but may also occur during the day.  She states that her hip is not bothering her as much but she is having some back and buttock pain that is worse on the right side.  She complains of weakness in the legs, worse on the right and she notices it more when carrying things or walking up stairs.  She is not  exercising and states that her activities are decreasing.    Her pain is constant, aching, burning, shooting, throbbing, stabbing, miserable, tiring, exhausting, penetrating, gnawing, nagging, with numbness.    She is on Chantix and has pretty much been off cigarettes except for an occasional one.    Pain scores:  Pain intensity on average is 9 on a scale of 0-10.  Ranges from 5/10 to 10/10.    THE 4 A's OF OPIOID MAINTENANCE ANALGESIA    Analgesia: yes    Activity: improved    Adverse effects: denies    Adherence to Rx protocol: yes    [place] Board of Pharmacy Data Base Reviewed:    YES; compliant    Current pain treatments:   Norco 5/325 one tab BID - occasionally three per day  Gabapentin 300 mg 3 capsules TID  Flexeril 10 mg at bedtime prn  Robaxin 750 mg TID - as needed    Past pain treatments:  Indomethacin - GI problems  Percocet - no different than hydrocodone  Relafen - made heart race  Norco 5/325 one tab BID  Gabapentin 300 mg 3 capsules TID  Flexeril 10 mg at bedtime  Robaxin 750 mg TID  Zonegran 25 mg QHS - different side effects      Procedures:  -12/12/16 caudal epidural steroid injection  -8/28/17 right L5-S1 facet and right SI joint injection  -1/22/18 SCS trial  -6/4/18 right hip injection (Dr Christensen)  -7/25/18 right hip bursa injection, lumbar TPI    Date of last UDS:  5/8/18    Side Effects: no side effect    Medications:  Current Outpatient Prescriptions   Medication Sig Dispense Refill     Cyanocobalamin (VITAMIN B-12 PO) Take 1,000 mcg by mouth daily        gabapentin (NEURONTIN) 300 MG capsule Take 3 capsules (900 mg) by mouth 3 times daily 270 capsule 3     HYDROcodone-acetaminophen (NORCO) 5-325 MG per tablet Take 1 tablet by mouth every 6 hours as needed for pain Max of 3 tablets per day on bad days. This is a 30 day supply.  Dispense on/after 10/12/18.  To start on 10/15/18 75 tablet 0     metoprolol tartrate (LOPRESSOR) 50 MG tablet TAKE ONE TABLET BY MOUTH TWICE A  tablet 2      Multiple Vitamins-Minerals (I-GOLDIE) TABS TAKE ONE TABLET BY MOUTH EVERY DAY 60 tablet 8     Pyridoxine HCl (VITAMIN B6 PO) Take 100 mg by mouth daily        varenicline (CHANTIX CONTINUING MONTH BI) 1 MG tablet Take 1 tablet (1 mg) by mouth 2 times daily Take 1 tablet by mouth twice daily. 60 tablet 1     VITAMIN D3 1000 UNITS tablet TAKE TWO TABLETS BY MOUTH EVERY  tablet 3      MG capsule TAKE ONE CAPSULE BY MOUTH EVERY DAY (Patient taking differently: TAKE ONE to Three CAPSULE BY MOUTH EVERY DAY) 100 capsule 3     nicotine polacrilex 4 MG lozenge Dissolve 1 tablet orally directed, as needed for smoking cravings. 100 tablet 2     polyethylene glycol (MIRALAX/GLYCOLAX) powder STIR 17 GM OF POWDER (SEE DEXTER INSIDE CAP) IN 8-OZ OF LIQUID UNTIL COMPLETELY DISSOLVED. DRINK THE SOLUTION DAILY OR AS DIRECTED. (Patient not taking: Reported on 9/12/2018) 527 g 0       Medical History: any changes in medical history since they were last seen? No    Review of Systems:  The 14 system ROS was reviewed from the intake questionnaire, and is positive for: headache, dizziness, vision changes, tinnitus, palpitations, joint pain, stiffness, back pain, neck pain, weakness, numbness, tingling, tremor, memory loss, anxiety, stress  Any bowel or bladder problems: denies changes - intermittent constipation  Mood: good    Physical Exam:  /79  Pulse 73  Wt 76.7 kg (169 lb)  BMI 26.47 kg/m2  Constitutional: alert and no distress.  Pt is overweight  Head: Normocephalic. No masses, lesions, tenderness or abnormalities  ENT: EOMI, mucosal surfaces moist.  Neck with full ROM, posture fair  Cardiovascular: No edema or JVD appreciated.  Respiratory: Good diaphragmatic excursion. No wheezes appreciated.  Speaking in complete sentences without shortness of breath.  No accessory muscle use.   Musculoskeletal: extremities normal- no gross deformities noted, normal muscle tone and able to move about the exam room without  difficulty.    Skin: no suspicious lesions or rashes appreciated on exposed areas  Neurologic: Gait mildly forward flexed, favors the right LE. Moving all extremities spontaneously, no apparent weakness.    Psychiatric: mentation appears normal, affect full and good eye contact.      Lumbar Spine:  Well healed lumbar surgical scars, flexes minimally, able to stand on toes and heels, tender paraspinal muscles, positive SLR right    Assessment:   1.  Chronic pain syndrome  2.  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  Diffuse myofascial pain (muscle pain)  4.  Muscle spasm/dystonia  5.  History of closed head injury  6.  Lumbar radiculopathy (radiating pain)  7.  Depression/anxiety  8.  Right foot drop - intermittent  9.  Proximal lower extremity weakness  10.  Chronic headache  11.  History of breast cancer, lung cancer  12.  Right hip and groin pain    Yusra Rivas is a 63 year old female who is seen at the pain clinic for chronic lower back and lower extremity pain since lumbar fusion.  She feels that the right leg pain has worsened since her last visit and is now associated with a cold, burning sensation.  She remains stable on her current medications.  Our treatment plan is as outlined below.    Plan:  1. Physical Therapy:  Resume physical therapy and increase home exercise and stretching as tolerated  2. Clinical Health Psychologist to address issues of relaxation, behavioral change, coping style, and other factors important to improvement.  deferred  3. Diagnostic Studies:  n/a  4. Medication Management:    1. Continue Norco 5/325 2-3 per day as needed for pain  2. Continue Gabapentin 900 mg TID  3. Continue Robaxin 750 - may try half a tablet for daytime dosing to decrease sedative effect  4. Continue Flexeril 10 mg for night time spasm  5. Further procedures recommended:   1. Lumbar transforaminal epidural steroid injection bilateral L5-S1 ordered today  2. Repeat hip bursa injection if  needed  6. Recommendations to PCP: continue current treatment  7. Patient to follow up with Primary Care provider regarding elevated blood pressure.  8. Smoking cessation and smoking effects on healing and chronic pain discussed with patient   9. Follow up: for injection and in 3 months    Total time spent was 30 minutes, and more than 50% of face to face time was spent in counseling and/or coordination of care regarding diagnosis, physical activity, medication management, and interventional procedures.    Antoine Narvaez MD  [facility]

## 2018-10-29 NOTE — PATIENT INSTRUCTIONS
Assessment:   1.  Chronic pain syndrome  2.  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  Diffuse myofascial pain (muscle pain)  4.  Muscle spasm/dystonia  5.  History of closed head injury  6.  Lumbar radiculopathy (radiating pain)  7.  Depression/anxiety  8.  Right foot drop - intermittent  9.  Proximal lower extremity weakness  10.  Chronic headache  11.  History of breast cancer, lung cancer  12.  Right hip and groin pain        Plan:  1. Physical Therapy:  Resume physical therapy and increase home exercise and stretching as tolerated  2. Clinical Health Psychologist to address issues of relaxation, behavioral change, coping style, and other factors important to improvement.  deferred  3. Diagnostic Studies:  n/a  4. Medication Management:    1. Continue Norco 5/325 2-3 per day as needed for pain  2. Continue Gabapentin 900 mg TID  3. Continue Robaxin 750 - may try half a tablet for daytime dosing to decrease sedative effect  4. Continue Flexeril 10 mg for night time spasm  5. Further procedures recommended:   1. Lumbar transforaminal epidural steroid injection bilateral L5-S1 ordered today  2. Repeat hip bursa injection if needed  6. Recommendations to PCP: continue current treatment  7. Patient to follow up with Primary Care provider regarding elevated blood pressure.  8. Smoking cessation and smoking effects on healing and chronic pain discussed with patient   9. Follow up: for injection and in 3 months      ----------------------------------------------------------------  Clinic Number:  [phone]   Call this number with any questions about your care and for scheduling assistance. Calls are returned Monday through Friday between 8 AM and 4:30 PM. We usually get back to you within 2 business days depending on the issue/request.       Medication refills:    For non-narcotic medications, call your pharmacy directly to request a refill. The pharmacy will contact the [facility] for  authorization. Please allow 3-4 days for these refills to be processed.     For narcotic refills, call the clinic number or send a [employer] message. Please contact us 7-10 days before your refill is due. The message MUST include the name of the specific medication(s) requested and how you would like to receive the prescription(s). The options are as follows:    Pain Clinic staff can mail the prescription to your pharmacy. Please tell us the name of the pharmacy.    You may pick the prescription up at the Pain Clinic (tell us the location) or during a clinic visit with your pain provider    Pain Clinic staff can deliver the prescription to the [facility] pharmacy in the clinic building. Please tell us the location.      We believe regular attendance is key to your success in our program.    Any time you are unable to keep your appointment we ask that you call us at least 24 hours in advance to let us know. This will allow us to offer the appointment time to another patient.

## 2018-10-29 NOTE — MR AVS SNAPSHOT
After Visit Summary   10/29/2018    Yusra Rivas    MRN: [id number]           Patient Information     Date Of Birth          1955        Visit Information        Provider Department      10/29/2018 3:30 PM [facility]ine [facility]brahim MD [facility]        Today's Diagnoses     Lumbar radiculopathy    -  1    Encounter for other specified aftercare        Spasm-trapezius         Neck Pain-right occipital pain from fall           Care Instructions    Assessment:   1.  Chronic pain syndrome  2.  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  Diffuse myofascial pain (muscle pain)  4.  Muscle spasm/dystonia  5.  History of closed head injury  6.  Lumbar radiculopathy (radiating pain)  7.  Depression/anxiety  8.  Right foot drop - intermittent  9.  Proximal lower extremity weakness  10.  Chronic headache  11.  History of breast cancer, lung cancer  12.  Right hip and groin pain        Plan:  1. Physical Therapy:  Resume physical therapy and increase home exercise and stretching as tolerated  2. Clinical Health Psychologist to address issues of relaxation, behavioral change, coping style, and other factors important to improvement.  deferred  3. Diagnostic Studies:  n/a  4. Medication Management:    1. Continue Norco 5/325 2-3 per day as needed for pain  2. Continue Gabapentin 900 mg TID  3. Continue Robaxin 750 - may try half a tablet for daytime dosing to decrease sedative effect  4. Continue Flexeril 10 mg for night time spasm  5. Further procedures recommended:   1. Lumbar transforaminal epidural steroid injection bilateral L5-S1 ordered today  2. Repeat hip bursa injection if needed  6. Recommendations to PCP: continue current treatment  7. Patient to follow up with Primary Care provider regarding elevated blood pressure.  8. Smoking cessation and smoking effects on healing and chronic pain discussed with patient   9. Follow up: for injection and in 3  months      ----------------------------------------------------------------  Clinic Number:  [phone]   Call this number with any questions about your care and for scheduling assistance. Calls are returned Monday through Friday between 8 AM and 4:30 PM. We usually get back to you within 2 business days depending on the issue/request.       Medication refills:    For non-narcotic medications, call your pharmacy directly to request a refill. The pharmacy will contact the [facility] for authorization. Please allow 3-4 days for these refills to be processed.     For narcotic refills, call the clinic number or send a [employer] message. Please contact us 7-10 days before your refill is due. The message MUST include the name of the specific medication(s) requested and how you would like to receive the prescription(s). The options are as follows:    Pain Clinic staff can mail the prescription to your pharmacy. Please tell us the name of the pharmacy.    You may pick the prescription up at the Pain Clinic (tell us the location) or during a clinic visit with your pain provider    Pain Clinic staff can deliver the prescription to the [facility] pharmacy in the clinic building. Please tell us the location.      We believe regular attendance is key to your success in our program.    Any time you are unable to keep your appointment we ask that you call us at least 24 hours in advance to let us know. This will allow us to offer the appointment time to another patient.               Follow-ups after your visit        Additional Services     PAIN INJECTION EVAL/TREAT/FOLLOW UP                 Your next 10 appointments already scheduled     Mar 19, 2019  2:30 PM CDT   [facility]    [address] [place] [id number] [phone]              Who to contact     If you have questions or need follow up information about  "today's clinic visit or your schedule please contact [facility] directly at [phone].  Normal or non-critical lab and imaging results will be communicated to you by MyChart, letter or phone within 4 business days after the clinic has received the results. If you do not hear from us within 7 days, please contact the clinic through [employer] or phone. If you have a critical or abnormal lab result, we will notify you by phone as soon as possible.  Submit refill requests through [employer] or call your pharmacy and they will forward the refill request to us. Please allow 3 business days for your refill to be completed.          Additional Information About Your Visit        MyChart Information     [employer] lets you send messages to your doctor, view your test results, renew your prescriptions, schedule appointments and more. To sign up, go to [email or web address] . Click on \"Log in\" on the left side of the screen, which will take you to the Welcome page. Then click on \"Sign up Now\" on the right side of the page.     You will be asked to enter the access code listed below, as well as some personal information. Please follow the directions to create your username and password.     Your access code is: [email or web address]  Expires: 2018  9:41 AM     Your access code will  in 90 days. If you need help or a new code, please call your [facility] clinic or [phone].        Care EveryWhere ID     This is your Care EveryWhere ID. This could be used by other organizations to access your [facility] medical records  [id number]        Your Vitals Were     Pulse BMI (Body Mass Index)                73 26.47 kg/m2           Blood Pressure from Last 3 Encounters:   10/29/18 143/79   18 111/62   18 134/73    Weight from Last 3 Encounters:   10/29/18 76.7 kg (169 lb)   18 73.9 kg (163 lb)   18 72.9 kg (160 lb 11.2 oz)              We Performed the Following     PAIN INJECTION EVAL/TREAT/FOLLOW " UP          Today's Medication Changes          These changes are accurate as of 10/29/18  4:08 PM.  If you have any questions, ask your nurse or doctor.               These medicines have changed or have updated prescriptions.        Dose/Directions     MG capsule   This may have changed:  See the new instructions.   Used for:  Slow transit constipation   Generic drug:  docusate sodium        TAKE ONE CAPSULE BY MOUTH EVERY DAY   Quantity:  100 capsule   Refills:  3       HYDROcodone-acetaminophen 5-325 MG per tablet   Commonly known as:  NORCO   This may have changed:  additional instructions   Used for:  Spasm, Neck pain        Dose:  1 tablet   Take 1 tablet by mouth every 6 hours as needed for pain Max of 3 tablets per day on bad days. This is a 30 day supply.  Dispense on/after 11/11/18.  To start on 11/14/18   Quantity:  75 tablet   Refills:  0            Where to get your medicines      These medications were sent to [facility] [address]   [address] [facility] [address]     Phone:  [phone]     gabapentin 300 MG capsule         Some of these will need a paper prescription and others can be bought over the counter.  Ask your nurse if you have questions.     Bring a paper prescription for each of these medications     HYDROcodone-acetaminophen 5-325 MG per tablet               Information about OPIOIDS     PRESCRIPTION OPIOIDS: WHAT YOU NEED TO KNOW   We gave you an opioid (narcotic) pain medicine. It is important to manage your pain, but opioids are not always the best choice. You should first try all the other options your care team gave you. Take this medicine for as short a time (and as few doses) as possible.    Some activities can increase your pain, such as bandage changes or therapy sessions. It may help to take your pain medicine 30 to 60 minutes before these activities. Reduce your stress by getting enough sleep, working on hobbies you enjoy and practicing  relaxation or meditation. Talk to your care team about ways to manage your pain beyond prescription opioids.    These medicines have risks:    DO NOT drive when on new or higher doses of pain medicine. These medicines can affect your alertness and reaction times, and you could be arrested for driving under the influence (DUI). If you need to use opioids long-term, talk to your care team about driving.    DO NOT operate heavy machinery    DO NOT do any other dangerous activities while taking these medicines.    DO NOT drink any alcohol while taking these medicines.     If the opioid prescribed includes acetaminophen, DO NOT take with any other medicines that contain acetaminophen. Read all labels carefully. Look for the word  acetaminophen  or  Tylenol.  Ask your pharmacist if you have questions or are unsure.    You can get addicted to pain medicines, especially if you have a history of addiction (chemical, alcohol or substance dependence). Talk to your care team about ways to reduce this risk.    All opioids tend to cause constipation. Drink plenty of water and eat foods that have a lot of fiber, such as fruits, vegetables, prune juice, apple juice and high-fiber cereal. Take a laxative (Miralax, milk of magnesia, Colace, Senna) if you don t move your bowels at least every other day. Other side effects include upset stomach, sleepiness, dizziness, throwing up, tolerance (needing more of the medicine to have the same effect), physical dependence and slowed breathing.    Store your pills in a secure place, locked if possible. We will not replace any lost or stolen medicine. If you don t finish your medicine, please throw away (dispose) as directed by your pharmacist. The [place] Pollution Control Agency has more information about safe disposal: https://www.pca.[facility].mn.us/living-green/managing-unwanted-medications         Primary Care Provider Office Phone # Fax #    Munir Kamaljit Lynn [phone]  [phone]       [address] [place] [address]        Equal Access to Services     [facility] ALESHA : Hadii jeff serrano navinjavan Soguy, watorida luqadaha, qaybta kaalmada tesszahida, trisha shadi prestonstephane pulidoori peralesjessica wilson. So [facility] [phone].    ATENCIÓN: Si habla español, tiene a ruiz disposición servicios gratuitos de asistencia lingüística. Llame al [phone].    We comply with applicable federal civil rights laws and [place] laws. We do not discriminate on the basis of race, color, national origin, age, disability, sex, sexual orientation, or gender identity.            Thank you!     Thank you for choosing [facility]  for your care. Our goal is always to provide you with excellent care. Hearing back from our patients is one way we can continue to improve our services. Please take a few minutes to complete the written survey that you may receive in the mail after your visit with us. Thank you!             Your Updated Medication List - Protect others around you: Learn how to safely use, store and throw away your medicines at www.disposemymeds.org.          This list is accurate as of 10/29/18  4:08 PM.  Always use your most recent med list.                   Brand Name Dispense Instructions for use Diagnosis    cholecalciferol 1000 UNIT tablet    vitamin D3    200 tablet    TAKE TWO TABLETS BY MOUTH EVERY DAY    Routine general medical examination at a health care facility        MG capsule   Generic drug:  docusate sodium     100 capsule    TAKE ONE CAPSULE BY MOUTH EVERY DAY    Slow transit constipation       gabapentin 300 MG capsule    NEURONTIN    270 capsule    Take 3 capsules (900 mg) by mouth 3 times daily    Encounter for other specified aftercare       HYDROcodone-acetaminophen 5-325 MG per tablet    NORCO    75 tablet    Take 1 tablet by mouth every 6 hours as needed for pain Max of 3 tablets per day on bad days. This is a 30 day supply.  Dispense on/after 11/11/18.  To  start on 11/14/18    Spasm, Neck pain       metoprolol tartrate 50 MG tablet    LOPRESSOR    180 tablet    TAKE ONE TABLET BY MOUTH TWICE A DAY    Hypertension goal BP (blood pressure) < 130/80       multivitamin Tabs per tablet     60 tablet    TAKE ONE TABLET BY MOUTH EVERY DAY    Routine history and physical examination of adult       nicotine polacrilex 4 MG lozenge     100 tablet    Dissolve 1 tablet orally directed, as needed for smoking cravings.    Encounter for tobacco use cessation counseling       polyethylene glycol powder    MIRALAX/GLYCOLAX    527 g    STIR 17 GM OF POWDER (SEE EDXTER INSIDE CAP) IN 8-OZ OF LIQUID UNTIL COMPLETELY DISSOLVED. DRINK THE SOLUTION DAILY OR AS DIRECTED.    Slow transit constipation       varenicline 1 MG tablet    CHANTIX CONTINUING MONTH BI    60 tablet    Take 1 tablet (1 mg) by mouth 2 times daily Take 1 tablet by mouth twice daily.    Malignant neoplasm of overlapping sites of right breast in female, estrogen receptor negative (H), Malignant neoplasm of lung, unspecified laterality, unspecified part of lung (H), Tobacco abuse       VITAMIN B-12 PO      Take 1,000 mcg by mouth daily        VITAMIN B6 PO      Take 100 mg by mouth daily

## 2018-10-30 ENCOUNTER — TELEPHONE (OUTPATIENT)
Dept: PALLIATIVE MEDICINE | Facility: CLINIC | Age: 63
End: 2018-10-30

## 2018-10-30 NOTE — TELEPHONE ENCOUNTER
Pre-screening Questions for Radiology Injections:    Injection to be done at which interventional clinic site? [facility]    Instruct patient to arrive as directed prior to the scheduled appointment time:    [place]: 30 minutes before      Procedure ordered by Dr. Sharri Narvaez    Procedure ordered? Lumbar Epidural Steroid Injection    What insurance would patient like us to bill for this procedure? Medicare, [employer]      Worker's comp or MVA (motor vehicle accident) -Any injection DO NOT SCHEDULE and route to Lela Abdirizak.      [employer] insurance - For SI joint injections, DO NOT SCHEDULE and route Lela Abdirizak. [employer] NO PA REQUIRED EFFECTIVE 11/1/2017      [employer]- MBB's must be scheduled at LEAST two weeks apart      [employer] - Any injection besides hip/shoulder/knee joint DO NOT SCHEDULE and route to Lela Abdirizak. She will obtain PA and call pt back to schedule procedure or notify pt of denial.        [employer]-Route to Lela for review    Any chance of pregnancy? NO   If YES, do NOT schedule and route to RN pool    Is an  needed? No     Patient has a drive home? (mandatory) YES: INFORMED    Is patient taking any blood thinners (plavix, coumadin, jantoven, warfarin, heparin, pradaxa or dabigatran )? No   If hold needed, do NOT schedule, route to RN pool     Is patient taking any aspirin products (includes Excedrin and Fiorinal)? No     If more than 325mg/day do NOT schedule; route to RN pool     For CERVICAL procedures, hold all aspirin products for 6 days.     Tell pt that if aspirin product is not held for 6 days, the procedure WILL BE cancelled.      Does the patient have a bleeding or clotting disorder? No     If YES, okay to schedule AND route to RN nurse pool    For any patients with platelet count <100, must be forwarded to provider    Is patient diabetic?  No  If YES, have them bring their glucometer.    Does patient have an active  infection or treated for one within the past week? No     Is patient currently taking any antibiotics?  No     For patients on chronic, preventative, or prophylactic antibiotics, procedures may be scheduled.     For patients on antibiotics for active or recent infection:    Amparo Grullon [place], [place]-antibiotic course must have been completed for 4 days    Is patient currently taking any steroid medications? (i.e. Prednisone, Medrol)  No     For patients on steroid medications:    Amparo Grullon Burton, [place]-steroid course must have been completed for 4 days    Reviewed with patient:  If you are started on any steroids or antibiotics between now and your appointment, you must contact us because the procedure may need to be cancelled.  Yes    Is patient actively being treated for cancer or immunocompromised? No  If YES, do NOT schedule and route to RN pool     Are you able to get on and off an exam table with minimal or no assistance? Yes  If NO, do NOT schedule and route to RN pool    Are you able to roll over and lay on your stomach with minimal or no assistance? Yes  If NO, do NOT schedule and route to RN pool     Any allergies to contrast dye, iodine, shellfish, or numbing and steroid medications? No  If YES, route to RN pool AND add allergy information to appointment notes    Allergies: Celexa [citalopram hydrobromide]; Cardizem cd; Zonisamide; Naprosyn [naproxen]; and Relafen [nabumetone]      Has the patient had a flu shot or any other vaccinations within 7 days before or after the procedure.  No     Does patient have an MRI/CT?  YES: MRI  (SI joint, hip injections, lumbar sympathetic blocks, and stellate ganglion blocks do not require an MRI)    Was the MRI done w/in the last 3 years?  Yes    Was MRI done at [facility]? Yes      If not, where was it done? N/A       If MRI was not done at [facility], [facility] do NOT schedule and route to nursing.  If pt has an imaging  disc, the injection may be scheduled but pt has to bring disc to appt. If they show up w/out disc the injection cannot be done    Reminders (please tell patient if applicable):       Instructed pt to arrive 30 minutes early for IV start if this is for a cervical procedure, ALL sympathetic (stellate ganglion, hypogastric, or lumbar sympathetic block) and all sedation procedures (RFA, spinal cord stimulation trials).  Not Applicable   -IVs are not routinely placed for Dr. Escobar cervical cases   -Dr. Smith: IVs for cervical ESIs and cervical TBDs (not CMBBs/facet inj)      If NPO for sedation, informed patient that it is okay to take medications with sips of water (except if they are to hold blood thinners).  Not Applicable   *DO take blood pressure medication if it is prescribed*      If this is for a cervical ANDIE, informed patient that aspirin needs to be held for 6 days.   Not Applicable      For all patients not having spinal cord stimulator (SCS) trials or radiofrequency ablations (RFAs), informed patient:    IV sedation is not provided for this procedure.  If you feel that an oral anti-anxiety medication is needed, you can discuss this further with your referring provider or primary care provider.  The Pain Clinic provider will discuss specifics of what the procedure includes at your appointment.  Most procedures last 10-20 minutes.  We use numbing medications to help with any discomfort during the procedure.  Not Applicable      Do not schedule procedures requiring IV placement in the first appointment of the day or first appointment after lunch. Do NOT schedule at 0745, 0815 or 1245. N/A      For patients 85 or older we recommend having an adult stay w/ them for the remainder of the day.   N/A    Does the patient have any questions?  NO  Ainsley Sky  [facility]

## 2018-11-05 ENCOUNTER — RADIOLOGY INJECTION OFFICE VISIT (OUTPATIENT)
Dept: PALLIATIVE MEDICINE | Facility: CLINIC | Age: 63
End: 2018-11-05
Payer: MEDICARE

## 2018-11-05 ENCOUNTER — RADIANT APPOINTMENT (OUTPATIENT)
Dept: RADIOLOGY | Facility: CLINIC | Age: 63
End: 2018-11-05
Attending: ANESTHESIOLOGY
Payer: MEDICARE

## 2018-11-05 VITALS
RESPIRATION RATE: 16 BRPM | SYSTOLIC BLOOD PRESSURE: 105 MMHG | DIASTOLIC BLOOD PRESSURE: 57 MMHG | HEART RATE: 74 BPM | OXYGEN SATURATION: 96 %

## 2018-11-05 DIAGNOSIS — M54.16 LUMBAR RADICULOPATHY: ICD-10-CM

## 2018-11-05 DIAGNOSIS — M96.1 FAILED BACK SURGICAL SYNDROME: Primary | ICD-10-CM

## 2018-11-05 DIAGNOSIS — M51.369 DDD (DEGENERATIVE DISC DISEASE), LUMBAR: ICD-10-CM

## 2018-11-05 PROCEDURE — 64483 NJX AA&/STRD TFRM EPI L/S 1: CPT | Mod: RT | Performed by: ANESTHESIOLOGY

## 2018-11-05 ASSESSMENT — PAIN SCALES - GENERAL: PAINLEVEL: MODERATE PAIN (4)

## 2018-11-05 NOTE — MR AVS SNAPSHOT
After Visit Summary   11/5/2018    Yusra iRvas    MRN: [id number]           Patient Information     Date Of Birth          1955        Visit Information        Provider Department      11/5/2018 11:15 AM Antoine Ibrahim MD [facility]        Care Instructions    [facility]   Procedure Discharge Instructions    Today you saw:    Dr. Antoine Narvaez      You had an:  Lumbar Epidural steroid injection      Medications used:  Lidocaine   Dexamethasone Depo-medrol  Omnipaque  Ropivicaine         Be cautious when walking. Numbness and/or weakness in the lower extremities may occur for up to 6-8 hours after the procedure due to effect of the local anesthetic    Do not drive for 6 hours. The effect of the local anesthetic could slow your reflexes.     You may resume your regular activities after 24 hours    Avoid strenuous activity for the first 24 hours    You may shower, however avoid swimming, tub baths or hot tubs for 24 hours following your procedure    You may have a mild to moderate increase in pain for several days following the injection.    It may take up to 14 days for the steroid medication to start working although you may feel the effect as early as a few days after the procedure.       You may use ice packs for 10-15 minutes, 3 to 4 times a day at the injection site for comfort    Do not use heat to painful areas for 6 to 8 hours. This will give the local anesthetic time to wear off and prevent you from accidentally burning your skin.     You may use anti-inflammatory medications (such as Ibuprofen or Aleve or Advil) or Tylenol for pain control if necessary    If you experience any of the following, call the Pain Clinic during work hours at [phone] or the Provider Line after hours at [phone]:  -Fever over 100 degree F  -Swelling, bleeding, redness, drainage, warmth at the injection site  -Progressive weakness or numbness in your legs    -Loss of bowel or bladder function  -Unusual new onset of pain that is not improving                Follow-ups after your visit        Your next 10 appointments already scheduled     Nov 05, 2018 11:15 AM CST   Radiology Injections with Antoine Narvaez MD   [facility] [place] ([facility] [place])    [address]  [place] [address]   [phone]            Nov 05, 2018 11:15 AM CST   XR LUMBAR EPIDURAL INJECTION with BEPAIN1   [facility] [place] [facility] ([facility] [place])    [address]  [address]  [place] [address]   [phone]           How do I prepare for my exam? (Food and drink instructions) No Food and Drink Restrictions.  How do I prepare for my exam? (Other instructions) * If you take Coumadin (or any other blood thinners) you may need to stop taking them up to 5 days prior to the exam. Talk to your doctor before stopping. * If you take Plavix, Ticlid, Pletal or Persantine, please ask your doctor if you should stop these medicines. You may need extra tests on the morning of your scan. * If you take Xarelto (Rivaroxaban), you may need to stop taking it 24 hours before treatment. Talk to your doctor before stopping this medicine.  What should I wear: Wear comfortable clothes.  How long does the exam take: Injections take about 30 to 45 minutes. Most people spend up to 2 hours in the clinic or hospital.  What should I bring: Please bring any scans or X-rays taken at other hospitals, if similar tests were done. Also bring a list of your medicines, including vitamins, minerals and over-the-counter drugs. It is safest to leave personal items at home. You will need a  : Plan to have someone drive you home afterward.  What do I need to tell my doctor: Tell your doctor in advance: * If you are allergic to X-ray dye (contrast fluid). * If you may be pregnant.  What should I do after the exam: You may have slight cramping during this exam. The  cramps should go away afterward. You may have some spotting after the exam.  What is this test: A spinal shot is done in or near the spine. You may receive steroid medicine (to reduce swelling) or contrast fluid (dye that makes the area show up more clearly on X-rays). A nerve root shot is a shot into the nerve near the spine. It may reduce inflammation near the nerve root or spinal cord and reduce pain in the arm or leg.  Who should I call with questions: If you have any questions, please call the Imaging Department where you will have your exam. Directions, parking instructions, and other information is available on our website, [email or web address].            Feb 04, 2019  3:00 PM CST   Return Visit with Antoine Narvaez MD   [facility] ([facility])    [id number] [place] [id number] [phone]            Mar 19, 2019  2:30 PM CDT   [facility] Visit with [facility]o MD   [facility]    [address] [place] [address] [phone]              Who to contact     If you have questions or need follow up information about today's clinic visit or your schedule please contact [facility] directly at [phone].  Normal or non-critical lab and imaging results will be communicated to you by [employer], letter or phone within 4 business days after the clinic has received the results. If you do not hear from us within 7 days, please contact the clinic through [employer] or phone. If you have a critical or abnormal lab result, we will notify you by phone as soon as possible.  Submit refill requests through [employer] or call your pharmacy and they will forward the refill request to us. Please allow 3 business days for your refill to be completed.          Additional Information About Your Visit        [employer] Information     [employer] lets you send messages to your doctor, view your test results,  "renew your prescriptions, schedule appointments and more. To sign up, go to [email or web address] . Click on \"Log in\" on the left side of the screen, which will take you to the Welcome page. Then click on \"Sign up Now\" on the right side of the page.     You will be asked to enter the access code listed below, as well as some personal information. Please follow the directions to create your username and password.     Your access code is: [email or web address]  Expires: 2018  8:41 AM     Your access code will  in 90 days. If you need help or a new code, please call your [facility] clinic or [phone].        Care EveryWhere ID     This is your Care EveryWhere ID. This could be used by other organizations to access your [facility] medical records  [id number]        Your Vitals Were     Pulse                   72            Blood Pressure from Last 3 Encounters:   18 118/65   10/29/18 143/79   18 111/62    Weight from Last 3 Encounters:   10/29/18 76.7 kg (169 lb)   18 73.9 kg (163 lb)   18 72.9 kg (160 lb 11.2 oz)              Today, you had the following     No orders found for display         Today's Medication Changes          These changes are accurate as of 18 11:14 AM.  If you have any questions, ask your nurse or doctor.               These medicines have changed or have updated prescriptions.        Dose/Directions     MG capsule   This may have changed:  See the new instructions.   Used for:  Slow transit constipation   Generic drug:  docusate sodium        TAKE ONE CAPSULE BY MOUTH EVERY DAY   Quantity:  100 capsule   Refills:  3                Primary Care Provider Office Phone # Fax #    Munir Kamaljit Lynn,  [phone]        [address] [place] [address]        Equal Access to Services     [facility] ALESHA AH: Luca Rosado, waaxda luqadaha, qaybta kaalmada adeoriyagurmeet, trisha wilson. So [facility] " [phone].    ATENCIÓN: Si cecy boyle, tiene a ruiz disposición servicios gratuitos de asistencia lingüística. Jean lyn [phone].    We comply with applicable federal civil rights laws and [place] laws. We do not discriminate on the basis of race, color, national origin, age, disability, sex, sexual orientation, or gender identity.            Thank you!     Thank you for choosing [facility]  for your care. Our goal is always to provide you with excellent care. Hearing back from our patients is one way we can continue to improve our services. Please take a few minutes to complete the written survey that you may receive in the mail after your visit with us. Thank you!             Your Updated Medication List - Protect others around you: Learn how to safely use, store and throw away your medicines at www.disposemymeds.org.          This list is accurate as of 11/5/18 11:14 AM.  Always use your most recent med list.                   Brand Name Dispense Instructions for use Diagnosis    cholecalciferol 1000 UNIT tablet    vitamin D3    200 tablet    TAKE TWO TABLETS BY MOUTH EVERY DAY    Routine general medical examination at a health care facility        MG capsule   Generic drug:  docusate sodium     100 capsule    TAKE ONE CAPSULE BY MOUTH EVERY DAY    Slow transit constipation       gabapentin 300 MG capsule    NEURONTIN    270 capsule    Take 3 capsules (900 mg) by mouth 3 times daily    Encounter for other specified aftercare       HYDROcodone-acetaminophen 5-325 MG per tablet    NORCO    75 tablet    Take 1 tablet by mouth every 6 hours as needed for pain Max of 3 tablets per day on bad days. This is a 30 day supply.  Dispense on/after 11/11/18.  To start on 11/14/18    Spasm, Neck pain       metoprolol tartrate 50 MG tablet    LOPRESSOR    180 tablet    TAKE ONE TABLET BY MOUTH TWICE A DAY    Hypertension goal BP (blood pressure) < 130/80       multivitamin Tabs per tablet     60  tablet    TAKE ONE TABLET BY MOUTH EVERY DAY    Routine history and physical examination of adult       nicotine polacrilex 4 MG lozenge     100 tablet    Dissolve 1 tablet orally directed, as needed for smoking cravings.    Encounter for tobacco use cessation counseling       polyethylene glycol powder    MIRALAX/GLYCOLAX    527 g    STIR 17 GM OF POWDER (SEE DEXTER INSIDE CAP) IN 8-OZ OF LIQUID UNTIL COMPLETELY DISSOLVED. DRINK THE SOLUTION DAILY OR AS DIRECTED.    Slow transit constipation       varenicline 1 MG tablet    CHANTIX CONTINUING MONTH BI    60 tablet    Take 1 tablet (1 mg) by mouth 2 times daily Take 1 tablet by mouth twice daily.    Malignant neoplasm of overlapping sites of right breast in female, estrogen receptor negative (H), Malignant neoplasm of lung, unspecified laterality, unspecified part of lung (H), Tobacco abuse       VITAMIN B-12 PO      Take 1,000 mcg by mouth daily        VITAMIN B6 PO      Take 100 mg by mouth daily

## 2018-11-05 NOTE — PATIENT INSTRUCTIONS
[facility]   Procedure Discharge Instructions    Today you saw:    Dr. Antoine Narvaez      You had an:  Lumbar Epidural steroid injection      Medications used:  Lidocaine   Dexamethasone Depo-medrol  Omnipaque  Ropivicaine         Be cautious when walking. Numbness and/or weakness in the lower extremities may occur for up to 6-8 hours after the procedure due to effect of the local anesthetic    Do not drive for 6 hours. The effect of the local anesthetic could slow your reflexes.     You may resume your regular activities after 24 hours    Avoid strenuous activity for the first 24 hours    You may shower, however avoid swimming, tub baths or hot tubs for 24 hours following your procedure    You may have a mild to moderate increase in pain for several days following the injection.    It may take up to 14 days for the steroid medication to start working although you may feel the effect as early as a few days after the procedure.       You may use ice packs for 10-15 minutes, 3 to 4 times a day at the injection site for comfort    Do not use heat to painful areas for 6 to 8 hours. This will give the local anesthetic time to wear off and prevent you from accidentally burning your skin.     You may use anti-inflammatory medications (such as Ibuprofen or Aleve or Advil) or Tylenol for pain control if necessary    If you experience any of the following, call the Pain Clinic during work hours at [phone] or the Provider Line after hours at [phone]:  -Fever over 100 degree F  -Swelling, bleeding, redness, drainage, warmth at the injection site  -Progressive weakness or numbness in your legs   -Loss of bowel or bladder function  -Unusual new onset of pain that is not improving

## 2018-11-05 NOTE — PROGRESS NOTES
Pre procedure Diagnosis: lumbar radiculopathy, lumbar degenerative disc disease   Post procedure Diagnosis: Same  Procedure performed: lumbar transforaminal epidural steroid injection at L5-S1 on the right, fluoroscopically guided, contrast controlled  Anesthesia: none  Complications: none - patient had complaints of cramping sensation in the calf with pressure in the hamstring and calf following the procedure  Operators: Antoine Narvaez MD     Indications:   Yusra Rivas is a 63 year old female who is known to me that presents today for lumbar epidural steroid injection.  They have a history of thoracolumbar fusion with continued lower back and right greater than left lower extremity pain.  Exam shows antalgic gait, lumbar tenderness, and positive SLR and they have tried conservative treatment including physical therapy, medications, and interventional procedures.    MRI was done on 7/26/18 which showed   FINDINGS: Plain films dated 8/31/2016 confirm five functional lumbar  vertebral segments. Extensive posterior hardware fusion from T11  through L5 with metallic artifact again noted. Pedicle screws are  present at every level except L2. The caudal thecal sac contents  appear intrinsically normal to the extent visualized. Mild superior  wedge deformity of L2 on L3 without acute bone marrow edema and  without change, indicating old healed compressions. No acute bony  abnormality. No evidence for osseous metastases.     T11-T12: Incompletely visualized but probably normal and unchanged.     T12-L1: Normal.     L1-L2: Signal loss and mild disc space narrowing without disc  bulge/protrusion or central or foraminal stenosis. Previous wide  bilateral laminectomies again noted. No change.     L2-L3: Signal loss without disc space narrowing or disc  bulge/protrusion. Previous wide bilateral laminectomy. No central or  foraminal stenosis. No change.     L3-L4: Early signal loss and mild disc space narrowing. No  disc  bulge/protrusion or central or foraminal stenosis. No change.     L4-L5: Early signal loss and no disc space narrowing/bulge/protrusion.  No central or foraminal stenosis. Probable prior bilateral laminectomy  and posterior bone graft fusion. No change.     L5-S1: Early signal loss with minimal disc bulge and no protrusion or  central stenosis. Moderate to marked posterior facet degenerative  changes bilaterally. Foramina are partially obscured by metallic  artifact, but there is at least mild bilateral foraminal stenosis. No  change.     Paraspinal soft tissues are unremarkable. No abnormal contrast  enhancement.       IMPRESSION:   1. No significant changes since 8/31/2016.  2. Extensive posterior hardware fusion from T11 through L5 with  laminectomy at some levels as described above.  3. At least mild bilateral foraminal stenosis at L5-S1. Otherwise no  significant central or foraminal stenosis throughout the lumbar  region.    Options/alternatives, benefits and risks were discussed with the patient including bleeding, infection, tissue trauma, numbness, weakness, paralysis, spinal cord injury, radiation exposure, headache and reaction to medications. Questions were answered to her satisfaction and she agrees to proceed. Voluntary informed consent was obtained and signed.     Vitals were reviewed: Yes  /57  Pulse 74  Resp 16  SpO2 96%  Allergies were reviewed:  Yes   Medications were reviewed:  Yes   Pre-procedure pain score: 4/10    Procedure:  After getting informed consent, patient was brought into the procedure suite and was placed in a prone position on the procedure table.   A Pause for the Cause was performed.  Patient was prepped and draped in sterile fashion.     After identifying the right L5-S1 neuroforamen, the C-arm was rotated to a right lateral oblique angle.  A total of 3 ml of Lidocaine 1% was used to anesthetize the skin and the needle track at the skin entry site coaxial with the  fluoroscopy beam, and overriding the superior aspect of the neuroforamen.  Then, a 25 gauge 5 inch spinal needle was advanced under intermittent fluoroscopy until it entered the foramen superiorly beneath the pedicle - multiple attempts were required due to prior lumbar fusion and bony contact.    The needle position was then inspected from anteroposterior and lateral views, and the needle was adjusted appropriately.  Aspiration was negative.  A total of 2 ml of Omnipaque-300 was injected, confirming appropriate position, with spread into the nerve root sheath and the epidural space, with no intravascular uptake. 1 ml was wasted    Then, after repeated negative aspiration, 2.5 ml of a combination of Depomedrol 40 mg, Decadron 5 mg, 0.5% bupivacaine 1 ml was injected and the needle was flushed with lidocaine and removed.    During the procedure, there was a paresthesia described as a pressure sensation with instillation of medication.  Hemostasis was achieved, the area was cleaned, and bandaids were placed when appropriate.  The patient tolerated the procedure well, and was taken to the recovery room.    Images were saved to PACS.    Post-procedure pain score: 3/10  Follow-up includes:   -f/u phone call in one week  -f/u with PCP and in the pain clinic as scheduled    Antoine Narvaez MD  [facility]

## 2018-11-05 NOTE — NURSING NOTE
Pre-procedure Intake    Have you been fasting? NA    If yes, for how long? NA    Are you taking a prescribed blood thinner such as coumadin, Plavix, Xarelto?    No    If yes, when did you take your last dose? NA    Do you take aspirin?  No    If cervical procedure, have you held aspirin for 6 days?   NA    Do you have any allergies to contrast dye, iodine, steroid and/or numbing medications?  YES: Steroid     Are you currently taking antibiotics or have an active infection?  NO    Have you had a fever/elevated temperature within the past week? NO    Are you currently taking oral steroids? NO    Do you have a ? Yes       Are you pregnant or breastfeeding?  NO    Are the vital signs normal?  Yes      Nery Kinsey CMA ([facility])

## 2018-11-05 NOTE — NURSING NOTE
Discharge Information    IV Discontiued Time:  NA    Amount of Fluid Infused:  NA    Discharge Criteria = When patient returns to baseline or as per MD order    Consciousness:  Pt is fully awake    Circulation:  BP +/- 20% of pre-procedure level    Respiration:  Patient is able to breathe deeply    O2 Sat:  Patient is able to maintain O2 Sat >92% on room air    Activity:  Moves 4 extremities on command    Ambulation:  Patient is able to stand and walk or stand and pivot into wheelchair    Dressing:  Clean/dry or No Dressing    Notes:   Discharge instructions and AVS given to patient    Patient meets criteria for discharge?  YES    Admitted to PCU?  No    Responsible adult present to accompany patient home?  Yes    Signature/Title:    Danis Luz RN Care Coordinator  [facility]

## 2018-11-13 ENCOUNTER — TELEPHONE (OUTPATIENT)
Dept: RADIOLOGY | Facility: CLINIC | Age: 63
End: 2018-11-13

## 2018-11-13 NOTE — TELEPHONE ENCOUNTER
Patient had a lumbar transforaminal epidural steroid injection at L5-S1 on the right  on 11/05/2018.  Called patient for an update.      Pt reported the following details:  Doing okay, States the first day was bad but felt a lot better the second day, then on 11/10/2018 states that her pain returned and was causing pain in new locations other than her orginial complaint.  She complained of pain more on the left side now versus the right side.  She desribes the pain as radiating from back to hip down to her feet.   Told patient that the information will be forwarded to her provider.  Also explained that, if a steroid medication was used, it could take up to 14 days to feel the full effect and if pt has any further questions or concerns pt should call the clinic at [phone].

## 2018-11-29 ENCOUNTER — VIRTUAL VISIT (OUTPATIENT)
Dept: ONCOLOGY | Facility: CLINIC | Age: 63
End: 2018-11-29
Attending: PSYCHOLOGIST
Payer: MEDICARE

## 2018-11-29 DIAGNOSIS — Z72.0 TOBACCO ABUSE DISORDER: Primary | ICD-10-CM

## 2018-11-29 DIAGNOSIS — C34.90 MALIGNANT NEOPLASM OF LUNG, UNSPECIFIED LATERALITY, UNSPECIFIED PART OF LUNG (H): ICD-10-CM

## 2018-11-29 DIAGNOSIS — C50.811 MALIGNANT NEOPLASM OF OVERLAPPING SITES OF RIGHT BREAST IN FEMALE, ESTROGEN RECEPTOR NEGATIVE (H): ICD-10-CM

## 2018-11-29 DIAGNOSIS — Z17.1 MALIGNANT NEOPLASM OF OVERLAPPING SITES OF RIGHT BREAST IN FEMALE, ESTROGEN RECEPTOR NEGATIVE (H): ICD-10-CM

## 2018-11-29 DIAGNOSIS — Z72.0 TOBACCO ABUSE: ICD-10-CM

## 2018-11-29 PROCEDURE — 40000114 ZZH STATISTIC NO CHARGE CLINIC VISIT

## 2018-11-29 RX ORDER — VARENICLINE TARTRATE 1 MG/1
1 TABLET, FILM COATED ORAL 2 TIMES DAILY
Qty: 60 TABLET | Refills: 1 | Status: SHIPPED | OUTPATIENT
Start: 2018-11-29 | End: 2019-01-09

## 2018-11-29 NOTE — PROGRESS NOTES
"TOBACCO TREATMENT FOLLOW UP VISIT    Subjective:      Patient is a 63 year old White female and was contacted to participate in Tobacco Treatment Program for Nicotine Dependence 11/29/2018 by the Tobacco Treatment Team. Patient  reports that she has been smoking Cigarettes.  She has been smoking about 0.50 packs per day. She has never used smokeless tobacco.     Patient has remained tobacco free since last visit. She reports a \"slip\" and had a few puffs due to stress, but felt the cigarette tasted \"gross\". Reports it isn't soething that she wants to do again because she doesn't want to relapse entirely and have to go through the process of quitting again. She will goes days without thinking of smoking even though her  continues to smoke. That is not a temptation, but the challenge is managing stress. She realizes instead of smoking, sitting down and relaxing in a chair is just as effective for her. She reports the biggest thing that keeps her motivated is seeing her  breathe and noticing how that sounds different than of someone who is healthy. \"I don't want that for myself.\"       Information:      Agreed to Participate in Program - Yes    Session Number: 6    Proactive Outreach- Yes    Smoking Status:  Not at all    Has attempted to quit in the last 30 days:  Yes    Current Cessation Medication Being Used - Chantix (Varenicline)    Withdrawal Symptoms: No withdrawal symptoms reported    Nicotine Product Used - Cigarettes    Amount of Use (CPD) - 0    Time to First Use -  Not currently smoking     Time of Last Cigarette: 1 to 7 days ago    Readiness (0-10) - 8    Barriers to quit- limited stress coping tools and other household member smokes       Summary of visit:      Yusra Rivas is still smoking/using tobacco: No  These MI interventions were used: Supported Autonomy, Collaboration, Evocation, Open-ended questions, Reflections: simple and complex and Change talk (evoked)  We discussed: Benefits " of quitting  Ways to cope with cravings and manage triggers  Ways to reduce stress to prevent relapse  Patient is ready to quit smoking or continue to stay 100% smoke-free.        Plan:      Patient will continue to take Chantix daily and focus on taking time for herself when needed to sit and relax in a chair to manage stress.     MTM Consult Completed: Yes

## 2018-11-29 NOTE — MR AVS SNAPSHOT
After Visit Summary   11/29/2018    Yusra Rivsa    MRN: [id number]           Patient Information     Date Of Birth          1955        Visit Information        Provider Department      11/29/2018 11:00 AM Specialist, [facility]        Today's Diagnoses     Tobacco abuse disorder    -  1       Follow-ups after your visit        Follow-up notes from your care team     Return in about 1 month (around 12/29/2018).      Your next 10 appointments already scheduled     Dec 27, 2018  9:00 AM CST   Telephone Call with Yvette Tobacco Treatment Specialist   [facility] ([facility])    [address]  [address]  [facility] [address] [phone]           Note: this is not an onsite visit; there is no need to come to the facility.  Thank you for choosing [facility] for your health care needs. Below is some information for patients who are interested in having their follow-up visit with a physician by telephone. In some cases, a telephone visit can be an effective and convenient way to manage your follow-up care. Choosing a telephone visit rather than a face to face visit for your follow-up care is a decision that you and your physician can make together to ensure it meets all of your needs.  A face to face visit is always an available option, if you choose to do so.  We want to make sure you have all of the information you need about the telephone visit option and answer all of your questions before you decide to schedule a telephone follow-up visit. If you have any questions, you may talk to a staff member or our financial counselor at [phone].  1. General overview Our clinic sees patients for a variety of conditions and concerns. A face to face visit with your doctor is required for any new concerns or for your initial visit. If you and your doctor decide that a follow up visit by  telephone is appropriate, you may decide to opt for a telephone visit.   2. Billing and insurance coverage There is a charge for telephone visits, similar to the charge for an in-person visit. Your bill is based on the amount of time you and your physician are on the phone. We will bill each visit to your insurance company (just like your other medical visits), and you will be responsible for any costs not paid by your insurance company. The charge may be denied by your insurance company, in which case you will be responsible for the entire amount billed. The decision to cover the cost is determined by your insurance company. If you want to know what your insurance company will cover, we encourage you to contact them to determine your coverage. The codes below are the codes we use when billing for telephone visits and the associated charges. This may help you work with your insurance company to determine your benefits.   Billing CPT codes for Telephone visits  19191 ($30), 59917 ($35), 82595 ($40)            Feb 04, 2019  3:00 PM CST   Return Visit with Antoine Narvaez MD   [facility] ([facility])    [address] [place] [id number] [phone]            Mar 19, 2019  2:30 PM CDT   Return Visit with Sandra Arroyo MD   [facility] ([facility])    [address] [place] [address] [phone]              Who to contact     If you have questions or need follow up information about today's clinic visit or your schedule please contact [facility] directly at [phone].  Normal or non-critical lab and imaging results will be communicated to you by MyChart, letter or phone within 4 business days after the clinic has received the results. If you do not hear from us within 7 days, please contact the clinic through MyChart or phone. If you have a critical or abnormal lab result, we will notify  "you by phone as soon as possible.  Submit refill requests through "[employer] or call your pharmacy and they will forward the refill request to us. Please allow 3 business days for your refill to be completed.          Additional Information About Your Visit        [employer] Information     "[employer] lets you send messages to your doctor, view your test results, renew your prescriptions, schedule appointments and more. To sign up, go to [email or web address] . Click on \"Log in\" on the left side of the screen, which will take you to the Welcome page. Then click on \"Sign up Now\" on the right side of the page.     You will be asked to enter the access code listed below, as well as some personal information. Please follow the directions to create your username and password.     Your access code is: [email or web address]  Expires: 2018  8:41 AM     Your access code will  in 90 days. If you need help or a new code, please call your [facility] clinic or [phone].        Care EveryWhere ID     This is your Care EveryWhere ID. This could be used by other organizations to access your [facility] medical records  [id number]         Blood Pressure from Last 3 Encounters:   18 105/57   10/29/18 143/79   18 111/62    Weight from Last 3 Encounters:   10/29/18 76.7 kg (169 lb)   18 73.9 kg (163 lb)   18 72.9 kg (160 lb 11.2 oz)              Today, you had the following     No orders found for display         Today's Medication Changes          These changes are accurate as of 18 11:43 AM.  If you have any questions, ask your nurse or doctor.               These medicines have changed or have updated prescriptions.        Dose/Directions     MG capsule   This may have changed:  See the new instructions.   Used for:  Slow transit constipation   Generic drug:  docusate sodium        TAKE ONE CAPSULE BY MOUTH EVERY DAY   Quantity:  100 capsule   Refills:  3                Primary Care Provider Office " Phone # Fax #    Munir Lynn [phone]       [address] [place] [address]        Equal Access to Services     ROSEMARY EDUARDO : Luca jeff serrano navino Somaryseali, waaxda luqadaha, qaybta kaalmada adeoriyada, trisha white laEricareal wilson. So [facility] [phone].    ATENCIÓN: Si habla español, tiene a ruiz disposición servicios gratuitos de asistencia lingüística. Llame al [phone].    We comply with applicable federal civil rights laws and [place] laws. We do not discriminate on the basis of race, color, national origin, age, disability, sex, sexual orientation, or gender identity.            Thank you!     Thank you for choosing [facility]  for your care. Our goal is always to provide you with excellent care. Hearing back from our patients is one way we can continue to improve our services. Please take a few minutes to complete the written survey that you may receive in the mail after your visit with us. Thank you!             Your Updated Medication List - Protect others around you: Learn how to safely use, store and throw away your medicines at www.disposemymeds.org.          This list is accurate as of 11/29/18 11:43 AM.  Always use your most recent med list.                   Brand Name Dispense Instructions for use Diagnosis     MG capsule   Generic drug:  docusate sodium     100 capsule    TAKE ONE CAPSULE BY MOUTH EVERY DAY    Slow transit constipation       gabapentin 300 MG capsule    NEURONTIN    270 capsule    Take 3 capsules (900 mg) by mouth 3 times daily    Encounter for other specified aftercare       HYDROcodone-acetaminophen 5-325 MG tablet    NORCO    75 tablet    Take 1 tablet by mouth every 6 hours as needed for pain Max of 3 tablets per day on bad days. This is a 30 day supply.  Dispense on/after 11/11/18.  To start on 11/14/18    Spasm, Neck pain       metoprolol tartrate 50 MG tablet    LOPRESSOR    180 tablet     TAKE ONE TABLET BY MOUTH TWICE A DAY    Hypertension goal BP (blood pressure) < 130/80       multivitamin Tabs per tablet     60 tablet    TAKE ONE TABLET BY MOUTH EVERY DAY    Routine history and physical examination of adult       nicotine 4 MG lozenge    NICORETTE    100 tablet    Dissolve 1 tablet orally directed, as needed for smoking cravings.    Encounter for tobacco use cessation counseling       polyethylene glycol powder    MIRALAX/GLYCOLAX    527 g    STIR 17 GM OF POWDER (SEE DEXTER INSIDE CAP) IN 8-OZ OF LIQUID UNTIL COMPLETELY DISSOLVED. DRINK THE SOLUTION DAILY OR AS DIRECTED.    Slow transit constipation       varenicline 1 MG tablet    CHANTIX CONTINUING MONTH BI    60 tablet    Take 1 tablet (1 mg) by mouth 2 times daily Take 1 tablet by mouth twice daily.    Malignant neoplasm of overlapping sites of right breast in female, estrogen receptor negative (H), Malignant neoplasm of lung, unspecified laterality, unspecified part of lung (H), Tobacco abuse       VITAMIN B-12 PO      Take 1,000 mcg by mouth daily        VITAMIN B6 PO      Take 100 mg by mouth daily        vitamin D3 1000 units (25 mcg) tablet    CHOLECALCIFEROL    200 tablet    TAKE TWO TABLETS BY MOUTH EVERY DAY    Routine general medical examination at a health care facility

## 2018-12-05 DIAGNOSIS — I10 HYPERTENSION GOAL BP (BLOOD PRESSURE) < 130/80: ICD-10-CM

## 2018-12-06 RX ORDER — METOPROLOL TARTRATE 50 MG
TABLET ORAL
Qty: 180 TABLET | Refills: 0 | Status: SHIPPED | OUTPATIENT
Start: 2018-12-06 | End: 2019-02-11

## 2018-12-06 NOTE — TELEPHONE ENCOUNTER
"Metoprolol Tart  Last Written Prescription Date:  03/21/2018  Last Fill Quantity: 180,  # refills: 0   Last office visit: 12/05/2017 with prescribing provider:  Sujit   Future Office Visit:   Next 5 appointments (look out 90 days)     Feb 04, 2019  3:00 PM CST   Return Visit with Antoine Narvaez MD   [facility] ([facility])    [address] [id number] [phone]              Medication is being filled for 1 time refill only due to:  Patient needs to be seen because it has been more than one year since last visit.    Requested Prescriptions   Pending Prescriptions Disp Refills     metoprolol tartrate (LOPRESSOR) 50 MG tablet [Pharmacy Med Name: METOPROLOL TARTRATE 50MG TABS] 180 tablet 2     Sig: TAKE ONE TABLET BY MOUTH TWICE A DAY    Beta-Blockers Protocol Failed    12/5/2018 11:15 AM       Failed - Recent (12 mo) or future (30 days) visit within the authorizing provider's specialty    Patient had office visit in the last 12 months or has a visit in the next 30 days with authorizing provider or within the authorizing provider's specialty.  See \"Patient Info\" tab in inbasket, or \"Choose Columns\" in Meds & Orders section of the refill encounter.         Passed - Blood pressure under 140/90 in past 12 months    BP Readings from Last 3 Encounters:   11/05/18 105/57   10/29/18 143/79   07/25/18 111/62          Passed - Patient is age 6 or older      Paula Flores RN   "

## 2018-12-14 DIAGNOSIS — R25.2 SPASM: ICD-10-CM

## 2018-12-14 DIAGNOSIS — M54.2 NECK PAIN: ICD-10-CM

## 2018-12-14 DIAGNOSIS — M96.1 FAILED BACK SURGICAL SYNDROME: Primary | ICD-10-CM

## 2018-12-14 NOTE — TELEPHONE ENCOUNTER
Reason for call:  Medication   If this is a refill request, has the caller requested the refill from the pharmacy already? No  Will the patient be using a [facility]? No  Name of the pharmacy and phone number for the current request: [facility] (Mail)    Name of the medication requested: HYDROcodone-acetaminophen (NORCO) 5-325 MG per tablet    Other request: Mail to pharmacy    Phone number to reach patient:  Home number on file [phone] (home)    Best Time:  Call when ready    Can we leave a detailed message on this number?  YES       Va HOGUE    [facility] [place]

## 2018-12-17 RX ORDER — HYDROCODONE BITARTRATE AND ACETAMINOPHEN 5; 325 MG/1; MG/1
1 TABLET ORAL EVERY 6 HOURS PRN
Qty: 75 TABLET | Refills: 0 | Status: SHIPPED | OUTPATIENT
Start: 2018-12-17 | End: 2019-01-10

## 2018-12-17 NOTE — TELEPHONE ENCOUNTER
Signed Prescriptions:                        Disp   Refills    HYDROcodone-acetaminophen (NORCO) 5-325 MG*75 tab*0        Sig: Take 1 tablet by mouth every 6 hours as needed for           pain Max of 3 tablets per day on bad days. This           is a 30 day supply.  Dispense on/after 12/18/18.            To start on 12/20/18  Authorizing Provider: ANTOINE SU    Reviewed, printed, signed in [facility].  Placed in MA basket for processing.    Antoine Narvaez MD  [facility]

## 2018-12-17 NOTE — TELEPHONE ENCOUNTER
Patient requesting refill(s) of HYDROcodone-acetaminophen (NORCO) 5-325 MG per tablet    Last picked up from pharmacy on 11/20/18    Pt last seen by prescribing provider on 11/05/18  Next appt scheduled for 02/04/19     checked in the past 6 months? Yes If no, print current report and give to RN    Last urine drug screen date 05/08/18  Current opioid agreement on file (completed within the last year) Yes Date of opioid agreement: 07/23/18    Processing (pick one and delete the others):      Mail to [facility]   [address] [facility] [address]    Will route to nursing [place] for review and preparation of prescription(s).

## 2018-12-17 NOTE — TELEPHONE ENCOUNTER
Medication refill information reviewed.     Last due:   Dispense on/after 11/11/18.  To start on 11/14/18 but filled on 11/20/18    Due date:  12/20/18    Weaning instructions:  N/A    Prescriptions prepped for review.     Angélica Benites, RN-BSN  [facility]

## 2018-12-17 NOTE — TELEPHONE ENCOUNTER
Patient was notified. Mailed out Rx to [facility] -  [address] [facility] [address]    Chauncey Flores MA

## 2018-12-26 ENCOUNTER — DOCUMENTATION ONLY (OUTPATIENT)
Dept: PHARMACY | Facility: CLINIC | Age: 63
End: 2018-12-26

## 2018-12-27 ENCOUNTER — VIRTUAL VISIT (OUTPATIENT)
Dept: ONCOLOGY | Facility: CLINIC | Age: 63
End: 2018-12-27
Attending: PSYCHOLOGIST
Payer: MEDICARE

## 2018-12-27 DIAGNOSIS — Z72.0 TOBACCO ABUSE DISORDER: Primary | ICD-10-CM

## 2018-12-27 PROCEDURE — 40000114 ZZH STATISTIC NO CHARGE CLINIC VISIT

## 2018-12-27 NOTE — PROGRESS NOTES
"TOBACCO TREATMENT FOLLOW UP VISIT    Subjective:      Patient is a 63 year old White female and was contacted to participate in Tobacco Treatment Program for Nicotine Dependence 12/27/2018 by the Tobacco Treatment Team. Patient  reports that she has been smoking cigarettes.  She has been smoking about 0.50 packs per day. she has never used smokeless tobacco.     Patient remains tobacco free, continuing to use varenicline daily. Patient reports only a rare urge to smoke, but that it lasts a minute or two and she is able to \"surf the urge\" and ride it out. Patient doesn't see any barriers to remaining tobacco free. Knows she is doing something helpful for her health. SHe has noticed she is saving money, isn't annoyed by the amount of time smoking takes up in her day and is no longer anxious about the stigma attached to being a smoker. Patient feels confident remaining tobacco free.        Information:      Agreed to Participate in Program - Yes    Session Number: 8    Proactive Outreach- Yes    Smoking Status:  Not at all    Has attempted to quit in the last 30 days:  Yes    Current Cessation Medication Being Used - Chantix (Varenicline)    Withdrawal Symptoms: No withdrawal symptoms reported    Nicotine Product Used - Cigarettes    Amount of Use (CPD) - 0    Time to First Use -  Not currently smoking     Time of Last Cigarette: More than 1 month ago to 1 year    Readiness (0-10) - 9    Barriers to quit- No Barriers       Summary of visit:      Yusra Rivas is still smoking/using tobacco: No  These MI interventions were used: Open-ended questions, Reflections: simple and complex and Change talk (evoked)  We discussed: Benefits of quitting  Ways to reduce stress to prevent relapse  Patient is ready to quit smoking or continue to stay 100% smoke-free.        Plan:      Quit Date: 10/17/18  Patient will continue taking varenicline daily.     MTM Consult Completed: Yes      "

## 2019-01-09 DIAGNOSIS — Z17.1 MALIGNANT NEOPLASM OF OVERLAPPING SITES OF RIGHT BREAST IN FEMALE, ESTROGEN RECEPTOR NEGATIVE (H): ICD-10-CM

## 2019-01-09 DIAGNOSIS — C34.90 MALIGNANT NEOPLASM OF LUNG, UNSPECIFIED LATERALITY, UNSPECIFIED PART OF LUNG (H): ICD-10-CM

## 2019-01-09 DIAGNOSIS — Z72.0 TOBACCO ABUSE: ICD-10-CM

## 2019-01-09 DIAGNOSIS — M96.1 FAILED BACK SURGICAL SYNDROME: ICD-10-CM

## 2019-01-09 DIAGNOSIS — C50.811 MALIGNANT NEOPLASM OF OVERLAPPING SITES OF RIGHT BREAST IN FEMALE, ESTROGEN RECEPTOR NEGATIVE (H): ICD-10-CM

## 2019-01-09 RX ORDER — VARENICLINE TARTRATE 1 MG/1
1 TABLET, FILM COATED ORAL 2 TIMES DAILY
Qty: 60 TABLET | Refills: 2 | Status: SHIPPED | OUTPATIENT
Start: 2019-01-09 | End: 2019-02-18

## 2019-01-09 NOTE — TELEPHONE ENCOUNTER
Reason for call:  Medication   If this is a refill request, has the caller requested the refill from the pharmacy already? No  Will the patient be using a [facility]? No  Name of the pharmacy and phone number for the current request: Mail to [facility]    Name of the medication requested: HYDROcodone-acetaminophen (NORCO) 5-325 MG tablet    Other request: Mail    Phone number to reach patient:  Home number on file [phone] (home)    Best Time:  n/a    Can we leave a detailed message on this number?  YES       Va HOGUE    [facility] [place]

## 2019-01-10 DIAGNOSIS — K59.01 SLOW TRANSIT CONSTIPATION: ICD-10-CM

## 2019-01-10 RX ORDER — DOCUSATE SODIUM 100 MG/1
CAPSULE, LIQUID FILLED ORAL
Qty: 30 CAPSULE | Refills: 0 | Status: SHIPPED | OUTPATIENT
Start: 2019-01-10 | End: 2019-04-03

## 2019-01-10 RX ORDER — HYDROCODONE BITARTRATE AND ACETAMINOPHEN 5; 325 MG/1; MG/1
1 TABLET ORAL EVERY 6 HOURS PRN
Qty: 75 TABLET | Refills: 0 | Status: SHIPPED | OUTPATIENT
Start: 2019-01-10 | End: 2019-03-18

## 2019-01-10 NOTE — TELEPHONE ENCOUNTER
Signed Rx mailing from [facility] on 01/11/19. Mailing to [facility]. Voicemail left for patient.

## 2019-01-10 NOTE — TELEPHONE ENCOUNTER
Patient requesting refill(s) of HYDROcodone-acetaminophen (NORCO) 5-325 MG tablet    Last picked up from pharmacy on 12/26/18    Pt last seen by prescribing provider on 10/29/18  Next appt scheduled for 2/4/19     checked in the past 6 months? Yes If no, print current report and give to RN    Last urine drug screen date 5/8/18  Current opioid agreement on file (completed within the last year) Yes Date of opioid agreement: 7/23/18    Processing (pick one and delete the others):      E-prescribe to [facility] pharmacy    Will route to nursing pool for review and preparation of prescription(s).

## 2019-01-10 NOTE — TELEPHONE ENCOUNTER
Medication refill information reviewed.     Due date for HYDROcodone-acetaminophen (NORCO) 5-325 MG tablet is 1/25/19     Prescriptions prepped for review.     Will route to provider.       mail to [facility]    Danis Luz RN  Care Coordinator   [facility]

## 2019-01-10 NOTE — TELEPHONE ENCOUNTER
Signed Prescriptions:                        Disp   Refills    HYDROcodone-acetaminophen (NORCO) 5-325 MG*75 tab*0        Sig: Take 1 tablet by mouth every 6 hours as needed for           pain Max of 3 tablets per day on bad days. This           is a 30 day supply.  Dispense on/after 1/23/19.            To start on 1/25/19  Authorizing Provider: ANTOINE SU    Reviewed, printed, signed in [place].  Given to MA for processing.    Antoine Narvaez MD  [facility]

## 2019-01-10 NOTE — TELEPHONE ENCOUNTER
"Requested Prescriptions   Pending Prescriptions Disp Refills      MG capsule [Pharmacy Med Name: DOK 100MG CAPS] 100 capsule 3    Last Written Prescription Date:  1/19/18  Last Fill Quantity: 100,  # refills: 2   Last office visit: 8/23/2016 with prescribing provider:  8/23/16   Future Office Visit:   Next 5 appointments (look out 90 days)    Feb 04, 2019  3:00 PM CST  Return Visit with Antoine Narvaez MD  [facility] ([facility]) [address] [id number] [phone]   Mar 19, 2019  2:30 PM CDT  [facility] Visit with [facility] MD  [facility] [address] [place] [address] [phone]        Sig: TAKE ONE CAPSULE BY MOUTH EVERY DAY    Laxatives Protocol Failed - 1/10/2019  1:44 PM       Failed - Recent (12 mo) or future (30 days) visit within the authorizing provider's specialty    Patient had office visit in the last 12 months or has a visit in the next 30 days with authorizing provider or within the authorizing provider's specialty.  See \"Patient Info\" tab in inbasket, or \"Choose Columns\" in Meds & Orders section of the refill encounter.             Passed - Patient is age 6 or older       Passed - Medication is active on med list        "

## 2019-01-10 NOTE — TELEPHONE ENCOUNTER
Routing refill request to provider for review/approval because:  Patient has not seen PCP since 2016    MYLENE SoaresN, RN  [facility]

## 2019-01-31 DIAGNOSIS — Z17.1 MALIGNANT NEOPLASM OF OVERLAPPING SITES OF RIGHT BREAST IN FEMALE, ESTROGEN RECEPTOR NEGATIVE (H): ICD-10-CM

## 2019-01-31 DIAGNOSIS — C50.811 MALIGNANT NEOPLASM OF OVERLAPPING SITES OF RIGHT BREAST IN FEMALE, ESTROGEN RECEPTOR NEGATIVE (H): ICD-10-CM

## 2019-01-31 DIAGNOSIS — Z00.00 ROUTINE GENERAL MEDICAL EXAMINATION AT A HEALTH CARE FACILITY: ICD-10-CM

## 2019-01-31 NOTE — TELEPHONE ENCOUNTER
"Requested Prescriptions   Pending Prescriptions Disp Refills     multivitamin (I-GOLDIE) TABS per tablet [Pharmacy Med Name: I-GOLDIE  TABS] 60 tablet 2    Last Written Prescription Date:  9/25/15  Last Fill Quantity: 60,  # refills: 0   Last office visit: 12/5/17 with prescribing provider:     Future Office Visit:   Next 5 appointments (look out 90 days)    Feb 04, 2019  3:00 PM CST  Return Visit with Antoine Narvaez MD  [facility] ([facility]) [address] [id number] [phone]   Mar 19, 2019  2:30 PM CDT  Return Visit with Sandra Arroyo MD  [facility] ([facility]) [address] [place] [id number] [phone]          Sig: TAKE ONE TABLET BY MOUTH EVERY DAY    Vitamin Supplements (Adult) Protocol Failed - 1/31/2019  4:17 PM       Failed - Recent (12 mo) or future (30 days) visit within the authorizing provider's specialty    Patient had office visit in the last 12 months or has a visit in the next 30 days with authorizing provider or within the authorizing provider's specialty.  See \"Patient Info\" tab in inbasket, or \"Choose Columns\" in Meds & Orders section of the refill encounter.             Passed - High dose Vitamin D not ordered       Passed - Medication is active on med list      Routing refill request to provider for review/approval because:  Patient needs to be seen because it has been more than 1 year since last office visit. Last OV was 12/5/17, last refill 2015    Will forward to schedulers to schedule patient for OV as soon as possible routine visit    Chantel Martínez RN            "

## 2019-02-01 RX ORDER — I-VITE, TAB 1000-60-2MG (60/BT) 300MCG-200
TAB ORAL
Qty: 60 TABLET | Refills: 2 | Status: SHIPPED | OUTPATIENT
Start: 2019-02-01 | End: 2019-02-04

## 2019-02-04 ENCOUNTER — OFFICE VISIT (OUTPATIENT)
Dept: PALLIATIVE MEDICINE | Facility: CLINIC | Age: 64
End: 2019-02-04
Payer: MEDICARE

## 2019-02-04 VITALS
WEIGHT: 178 LBS | SYSTOLIC BLOOD PRESSURE: 141 MMHG | HEART RATE: 81 BPM | BODY MASS INDEX: 27.88 KG/M2 | DIASTOLIC BLOOD PRESSURE: 76 MMHG

## 2019-02-04 DIAGNOSIS — M96.1 FAILED BACK SURGICAL SYNDROME: ICD-10-CM

## 2019-02-04 DIAGNOSIS — C34.90 MALIGNANT NEOPLASM OF LUNG, UNSPECIFIED LATERALITY, UNSPECIFIED PART OF LUNG (H): ICD-10-CM

## 2019-02-04 DIAGNOSIS — G89.29 CHRONIC RIGHT HIP PAIN: ICD-10-CM

## 2019-02-04 DIAGNOSIS — M54.16 LUMBAR RADICULOPATHY: ICD-10-CM

## 2019-02-04 DIAGNOSIS — F41.1 GENERALIZED ANXIETY DISORDER: ICD-10-CM

## 2019-02-04 DIAGNOSIS — G89.4 CHRONIC PAIN DISORDER: Primary | ICD-10-CM

## 2019-02-04 DIAGNOSIS — G89.4 CHRONIC PAIN SYNDROME: ICD-10-CM

## 2019-02-04 DIAGNOSIS — G62.0 POLYNEUROPATHY DUE TO DRUGS (H): ICD-10-CM

## 2019-02-04 DIAGNOSIS — M25.551 CHRONIC RIGHT HIP PAIN: ICD-10-CM

## 2019-02-04 PROCEDURE — 99214 OFFICE O/P EST MOD 30 MIN: CPT | Performed by: ANESTHESIOLOGY

## 2019-02-04 ASSESSMENT — PAIN SCALES - GENERAL: PAINLEVEL: SEVERE PAIN (7)

## 2019-02-04 NOTE — PATIENT INSTRUCTIONS
Assessment:   1.  Chronic pain syndrome  2.  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  Diffuse myofascial pain (muscle pain)  4.  Muscle spasm/dystonia  5.  History of closed head injury  6.  Lumbar radiculopathy (radiating pain)  7.  Depression/anxiety  8.  Right foot drop - intermittent  9.  Proximal lower extremity weakness  10.  Chronic headache  11.  History of breast cancer, lung cancer  12.  Right hip and groin pain      Plan:  1. Physical Therapy:  Continue self directed exercise  2. Clinical Health Psychologist to address issues of relaxation, behavioral change, coping style, and other factors important to improvement.  deferred  3. Diagnostic Studies:  Reviewed diagnostic imaging  4. Medication Management:    1. Continue Norco 5/325 2-3 per ay as needed for pain  2. Continue Gabapentin 900 mg TID  3. Continue Robaxin 750 mg prn spasm  4. Continue Flexeril 10 mg prn spasm  5. Further procedures recommended:   1. Consider lumbar transforaminal epidural steroid injection right L3-4  2. Repeat hip bursa injection if needed  6. Recommendations to PCP: continue current treatment  7. Patient to follow up with Primary Care provider regarding elevated blood pressure.  8. Follow up: will reach out to medical director regarding transfer of care to Pain Management clinic in [place], [place] due to transportation issues - would recommend continued follow up every 3 months      ----------------------------------------------------------------  Clinic Number:  [phone]   Call this number with any questions about your care and for scheduling assistance. Calls are returned Monday through Friday between 8 AM and 4:30 PM. We usually get back to you within 2 business days depending on the issue/request.       Medication refills:    For non-narcotic medications, call your pharmacy directly to request a refill. The pharmacy will contact the Pain Management Center for authorization. Please allow 3-4 days for these  refills to be processed.     For narcotic refills, call the clinic number or send a ClaimReturn message. Please contact us 7-10 days before your refill is due. The message MUST include the name of the specific medication(s) requested and how you would like to receive the prescription(s). The options are as follows:    Pain Clinic staff can mail the prescription to your pharmacy. Please tell us the name of the pharmacy.    You may pick the prescription up at the Pain Clinic (tell us the location) or during a clinic visit with your pain provider    Pain Clinic staff can deliver the prescription to the [facility] pharmacy in the clinic building. Please tell us the location.      We believe regular attendance is key to your success in our program.    Any time you are unable to keep your appointment we ask that you call us at least 24 hours in advance to let us know. This will allow us to offer the appointment time to another patient.

## 2019-02-04 NOTE — PROGRESS NOTES
[facility]    Date of visit: 2/4/2019    Chief complaint:   Chief Complaint   Patient presents with     Pain       Interval history:  Yusra Rivas was last seen by me on 11/5/18 for lumbar TFESI right L5-S1.  Her last office visit was on 10/29/18.      Recommendations/plan at the last visit included:  Plan:  1. Physical Therapy:  Resume physical therapy and increase home exercise and stretching as tolerated  2. Clinical Health Psychologist to address issues of relaxation, behavioral change, coping style, and other factors important to improvement.  deferred  3. Diagnostic Studies:  n/a  4. Medication Management:    1. Continue Norco 5/325 2-3 per day as needed for pain  2. Continue Gabapentin 900 mg TID  3. Continue Robaxin 750 - may try half a tablet for daytime dosing to decrease sedative effect  4. Continue Flexeril 10 mg for night time spasm  5. Further procedures recommended:   1. Lumbar transforaminal epidural steroid injection bilateral L5-S1 ordered today  2. Repeat hip bursa injection if needed  6. Recommendations to PCP: continue current treatment  7. Patient to follow up with Primary Care provider regarding elevated blood pressure.  8. Smoking cessation and smoking effects on healing and chronic pain discussed with patient   9. Follow up: for injection and in 3 months    Since her last visit, Yusra Rivas reports:  She continues with lower back pain with pain radiating down the right lower extremity as well as pain in the feet bilaterally.  She had a pain flare after her last epidural steroid injection.  She feels that her back is a bit better now and she is having a lot of muscle pain and tightness in her right greater than left lower extremities.  She complains of numbness and tingling in the feet and both legs up to the knees and at times her legs and feet feel cold.  She states that if she puts her hand on an area that is cold it will warm up very quickly but when she takes  her hand away it gets cold again.  Her pain at this time is worse around the right hip and into the front and inside of the right thigh and always feels tight.  She complains that she has problems lifting her right leg up and at times has to help it with her hands.  She has a history of extensive thoracolumbar fusion and has undergone hip injections and multiple lumbar and SI joint procedures with varying response.  She is not really interested in further injections at this time.    Pain scores:  Pain intensity on average is 7 on a scale of 0-10.  Ranges from 5/10 to 9/10.    THE 4 A's OF OPIOID MAINTENANCE ANALGESIA    Analgesia: yes    Activity: improved    Adverse effects: denies    Adherence to Rx protocol: yes    [place] Board  Pharmacy Data Base Reviewed:    YES; compliant    Current pain treatments:   Norco 5/325 one tab BID - occasionally three per day  Gabapentin 300 mg 3 capsules TID  Flexeril 10 mg at bedtime prn  Robaxin 750 mg TID - as needed    Past pain treatments:  Indomethacin - GI problems  Percocet - no different than hydrocodone  Relafen - made heart race  Norco 5/325 one tab BID  Gabapentin 300 mg 3 capsules TID  Flexeril 10 mg at bedtime  Robaxin 750 mg TID  Zonegran 25 mg QHS - different side effects      Procedures:  - 11/5/18 lumbar TFESI right L5-S1  - 12/12/16 caudal epidural steroid injection  - 8/28/17 right L5-S1 facet and right SI joint injection  - 1/22/18 SCS trial  - 6/4/18 right hip injection (Dr Christensen)  - 7/25/18 right hip bursa injection, lumbar TPI    Date of last UDS:  5/8/18    Side Effects: no side effect    Medications:  Current Outpatient Medications   Medication Sig Dispense Refill     Cyanocobalamin (VITAMIN B-12 PO) Take 1,000 mcg by mouth daily         MG capsule TAKE ONE CAPSULE BY MOUTH EVERY DAY 30 capsule 0     gabapentin (NEURONTIN) 300 MG capsule Take 3 capsules (900 mg) by mouth 3 times daily 270 capsule 3     HYDROcodone-acetaminophen (NORCO) 5-325  MG tablet Take 1 tablet by mouth every 6 hours as needed for pain Max of 3 tablets per day on bad days. This is a 30 day supply.  Dispense on/after 1/23/19.  To start on 1/25/19 75 tablet 0     metoprolol tartrate (LOPRESSOR) 50 MG tablet TAKE ONE TABLET BY MOUTH TWICE A  tablet 0     Multiple Vitamins-Minerals (I-GOLDIE) TABS TAKE ONE TABLET BY MOUTH EVERY DAY 60 tablet 8     nicotine polacrilex 4 MG lozenge Dissolve 1 tablet orally directed, as needed for smoking cravings. 100 tablet 2     polyethylene glycol (MIRALAX/GLYCOLAX) powder STIR 17 GM OF POWDER (SEE DEXTER INSIDE CAP) IN 8-OZ OF LIQUID UNTIL COMPLETELY DISSOLVED. DRINK THE SOLUTION DAILY OR AS DIRECTED. 527 g 0     Pyridoxine HCl (VITAMIN B6 PO) Take 100 mg by mouth daily        ranitidine (ZANTAC) 150 MG tablet Take 150 mg by mouth 2 times daily       varenicline (CHANTIX CONTINUING MONTH BI) 1 MG tablet Take 1 tablet (1 mg) by mouth 2 times daily 60 tablet 2     VITAMIN D3 1000 UNITS tablet TAKE TWO TABLETS BY MOUTH EVERY  tablet 3     multivitamin (I-GOLDIE) TABS per tablet TAKE ONE TABLET BY MOUTH EVERY DAY (Patient not taking: Reported on 2/4/2019) 60 tablet 2       Medical History: any changes in medical history since they were last seen? No    Review of Systems:  The 14 system ROS was reviewed from the intake questionnaire, and is positive for: headache, dizziness, vision changes, tinnitus, palpitations, nausea, vomiting, diarrhea, joint pain, stiffness, back pain, neck pain, weakness, numbness, tingling, tremor, memory loss, anxiety, stress  Any bowel or bladder problems: constipated, denies other changes  Mood: good    Physical Exam:  /76   Pulse 81   Wt 80.7 kg (178 lb)   BMI 27.88 kg/m    Constitutional: alert and no distress.  Pt is obese  Head: Normocephalic. No masses, lesions, tenderness or abnormalities  ENT: EOMI, mucosal surfaces moist.  Neck with full ROM, posture fair  Cardiovascular: No edema or JVD  appreciated.  Respiratory: Good diaphragmatic excursion. No wheezes appreciated.  Speaking in complete sentences without shortness of breath.  No accessory muscle use.   Musculoskeletal: extremities normal- no gross deformities noted, normal muscle tone and able to move about the exam room without difficulty.    Skin: no suspicious lesions or rashes appreciated on exposed areas  Neurologic: Gait antalgic, favors the right lower extremity. Moving all extremities spontaneously, 4-5/5 right hip flexor and quadriceps strength.    Psychiatric: mentation appears normal, affect full and good eye contact.      Cervical Spine:  Good ROM, exam limited  Lumbar Spine:  Well healed surgical scars, tender paraspinal muscles and right greater than left SI joints.  Flexes 90 degrees, lateral bending limited but negative for facet loading pain, seated SLR equivocal bilaterally    Assessment:   1.  Chronic pain syndrome  2.  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  Diffuse myofascial pain (muscle pain)  4.  Muscle spasm/dystonia  5.  History of closed head injury  6.  Lumbar radiculopathy (radiating pain)  7.  Depression/anxiety  8.  Right foot drop - intermittent  9.  Proximal lower extremity weakness  10.  Chronic headache  11.  History of breast cancer, lung cancer  12.  Right hip and groin pain    Yusra Rivas is a 63 year old female who is seen at the pain clinic for chronic, post fusion lower back pain with continued radicular pain down the right lower extremity.  Her pain is more in an L3 distribution at this time, although she continues with pain in the L5 and S1 distributions as well on the right.  She is not real interested in further procedures at this time.  She remains stable on her current medication regimen without significant side effects.  She has difficulties traveling here for her appointments and is interested in transferring her pain management care to the [facility].  Our treatment  plan is as outlined below.    Plan:  1. Physical Therapy:  Continue self directed exercise  2. Clinical Health Psychologist to address issues of relaxation, behavioral change, coping style, and other factors important to improvement.  deferred  3. Diagnostic Studies:  Reviewed diagnostic imaging  4. Medication Management:    1. Continue Norco 5/325 2-3 per ay as needed for pain  2. Continue Gabapentin 900 mg TID  3. Continue Robaxin 750 mg prn spasm  4. Continue Flexeril 10 mg prn spasm  5. Further procedures recommended:   1. Consider lumbar transforaminal epidural steroid injection right L3-4  2. Repeat hip bursa injection if needed  6. Recommendations to PCP: continue current treatment  7. Patient to follow up with Primary Care provider regarding elevated blood pressure.  8. Follow up: will reach out to medical director regarding transfer of care to Pain Management clinic in [place], [place] due to transportation issues - would recommend continued follow up every 3 months    Total time spent was 30 minutes, and more than 50% of face to face time was spent in counseling and/or coordination of care regarding diagnosis, physical activity, medication management, and interventional procedures.    Antoine Narvaez MD  [facility]

## 2019-02-15 ENCOUNTER — PATIENT OUTREACH (OUTPATIENT)
Dept: ONCOLOGY | Facility: CLINIC | Age: 64
End: 2019-02-15

## 2019-02-15 DIAGNOSIS — Z72.0 TOBACCO ABUSE DISORDER: Primary | ICD-10-CM

## 2019-02-15 NOTE — PROGRESS NOTES
"TOBACCO TREATMENT FOLLOW UP VISIT    Subjective:      Patient is a 63 year old White female and was contacted to assess progress in Tobacco Treatment Program for Nicotine Dependence 2/15/2019 by the Tobacco Treatment Team. Patient  reports that she quit smoking about 3 months ago. Her smoking use included cigarettes. She has a 52.50 pack-year smoking history. she has never used smokeless tobacco.     Patient has successfully stayed tobacco free since last visit. Today was her first day not using varenicline. She was unsure what to think about this and hadn't give it much thought to how she would feel not having a cessation medication. She is optimistic that there will be no change in being tobacco free without use of varenicline.     Patient has noticed that she is able to relax more without smoking. She feels she is able to sit in a chair without doing anything and relax. Previously was in need of smoking while relaxing. \"I don't worry about lighting up anymore.\" Patient feels proud that she has quit smoking. Reports feeling healthier and is now focused on gut health. Is not bothered by people smoking around her stating, \"I can't stand the smell, they can have their cigarettes!\"       Information:      Agreed to Participate in Program - Yes    Session Number: 9    Proactive Outreach- Yes    Smoking Status:  Not at all    Has attempted to quit in the last 30 days:  Yes    Current Cessation Medication Being Used - Chantix (Varenicline)    Withdrawal Symptoms: No withdrawal symptoms reported    Nicotine Product Used - Cigarettes    Amount of Use (CPD) - 0    Time to First Use -  Not currently smoking     Time of Last Cigarette: More than 1 month ago to 1 year    Readiness (0-10) - 10    Barriers to quit- No Barriers       Summary of visit:      Yusra Rivas is still smoking/using tobacco: No  These MI interventions were used: Supported Autonomy, Collaboration, Evocation, Open-ended questions, Reflections: simple and " "complex and Change talk (evoked)  We discussed: Benefits of quitting  Ways to reduce stress to prevent relapse  Total abstinence  Motivation Level Cooperative  Patient is ready to quit smoking or continue to stay 100% smoke-free.        Plan:      Will continue to do things that keep her tobacco free. \"surf the urge\" if thoughts of smoking come up. Will follow up with TTS in 1 month to assess smoking status without varenicline and continue discussion on relapse prevention.     Quit date: 10/17/18    MTM Consult Completed: Yes      "

## 2019-02-15 NOTE — PATIENT INSTRUCTIONS
Patient Education     Staying Smoke-Free     Deep breathing can help ease the urge to smoke.     Quitting smoking is a big change. People will congratulate you. You have the right to be proud. But later at times you may miss smoking. Plan ahead to resist temptation.  Prepare to be tempted    If you feel the urge to smoke, distract yourself for about 5 minutes. Drink water. Call a friend, walk around the room, or try deep breathing. Usually, the urge to smoke will pass.    Don t trust yourself to have  just one cigarette.  Many ex-smokers get hooked again that way.    Remind yourself why you quit. Tell yourself you can stay quit.    Avoid people or places that can trigger you to smoke. Ask others not to smoke in your home or car.    Spend time in places where you can t smoke  a museum, a library, a store, or a gym.    Take your nonsmoking life one day at a time. Giovanni each day on your calendar.    HALT your desire. Keep yourself from feeling too Hungry, Angry, Lonely, or Tired. Deal with your real needs. Eat, talk, or sleep.    Put aside cigarette money and reward yourself.  If you slip  You may slip and smoke again. Many ex-smokers slip on the way to success. If you do, it s not the end of your quit process. Think about what triggered you to smoke. Then think of ways to prevent future slips. Ask yourself what you can learn from the slip. Decide how you will handle this trigger better in the future. Then get back on track right away!  Don t give up  Keep telling yourself you re no longer a smoker. Don t lose hope. Most people have tried to quit several times before being successful. Try to stay focused on your plan to be smoke-free. Keep in mind all the benefits of staying quit. Millions of people have given up smoking. You can too.        For more information    https://smokefree.gov/zrab-ix-hb-expert    [employer] [place] Smoking Quitline: 877-44U-QUIT (363-479-2499)      Date Last Reviewed: 2/1/2017     4518-3510 [employer]. [address] [place] [address], [place] [address]. All rights reserved. This information is not intended as a substitute for professional medical care. Always follow your healthcare professional's instructions.

## 2019-02-18 ENCOUNTER — OFFICE VISIT (OUTPATIENT)
Dept: PODIATRY | Facility: CLINIC | Age: 64
End: 2019-02-18
Payer: MEDICARE

## 2019-02-18 VITALS
HEIGHT: 67 IN | BODY MASS INDEX: 29.51 KG/M2 | TEMPERATURE: 99.1 F | SYSTOLIC BLOOD PRESSURE: 102 MMHG | WEIGHT: 188 LBS | DIASTOLIC BLOOD PRESSURE: 62 MMHG

## 2019-02-18 DIAGNOSIS — M54.42 CHRONIC BILATERAL LOW BACK PAIN WITH BILATERAL SCIATICA: ICD-10-CM

## 2019-02-18 DIAGNOSIS — G89.29 CHRONIC BILATERAL LOW BACK PAIN WITH BILATERAL SCIATICA: ICD-10-CM

## 2019-02-18 DIAGNOSIS — G89.4 CHRONIC PAIN SYNDROME: Primary | ICD-10-CM

## 2019-02-18 DIAGNOSIS — R20.2 NUMBNESS AND TINGLING OF BOTH LOWER EXTREMITIES: ICD-10-CM

## 2019-02-18 DIAGNOSIS — M54.41 CHRONIC BILATERAL LOW BACK PAIN WITH BILATERAL SCIATICA: ICD-10-CM

## 2019-02-18 DIAGNOSIS — M79.18 MYOFASCIAL PAIN: ICD-10-CM

## 2019-02-18 DIAGNOSIS — R20.0 NUMBNESS AND TINGLING OF BOTH LOWER EXTREMITIES: ICD-10-CM

## 2019-02-18 DIAGNOSIS — Z98.1 HISTORY OF LUMBAR FUSION: ICD-10-CM

## 2019-02-18 PROCEDURE — 99215 OFFICE O/P EST HI 40 MIN: CPT | Performed by: PHYSICIAN ASSISTANT

## 2019-02-18 RX ORDER — OXYCODONE AND ACETAMINOPHEN 5; 325 MG/1; MG/1
1 TABLET ORAL EVERY 6 HOURS PRN
Qty: 75 TABLET | Refills: 0 | Status: SHIPPED | OUTPATIENT
Start: 2019-02-18 | End: 2019-03-18

## 2019-02-18 ASSESSMENT — MIFFLIN-ST. JEOR: SCORE: 1440.39

## 2019-02-18 ASSESSMENT — PAIN SCALES - GENERAL: PAINLEVEL: SEVERE PAIN (7)
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
## 2019-02-18 NOTE — PROGRESS NOTES
"/62   Temp 99.1  F (37.3  C) (Temporal)   Ht 1.702 m (5' 7\")   Wt 85.3 kg (188 lb)   BMI 29.44 kg/m    Body mass index is 29.44 kg/m .  5' 7\"  188 lbs 0 oz        Geni Simpson MA on 2/18/2019 at 1:57 PM    "
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
## 2019-02-18 NOTE — LETTER
[facility]  02/18/19    Patient: Yusra Rivas  YOB: 1955  Medical Record Number: [id number]                                                                  Opioid / Opioid Plus Controlled Substance Agreement    I understand that my care provider has prescribed an opioid (narcotic) controlled substance to help manage my condition(s). I am taking this medicine to help me function or work. I know this is strong medicine, and that it can cause serious side effects. Opioid medicine can be sedating, addicting and may cause a dependency on the drug. They can affect my ability to drive or think, and cause depression. They need to be taken exactly as prescribed. Combining opioids with certain medicines or chemicals (such as cocaine, sedatives and tranquilizers, sleeping pills, meth) can be dangerous or even fatal. Also, if I stop opioids suddenly, I may have severe withdrawal symptoms. Last, I understand that opioids do not work for all types of pain nor for all patients. If not helpful, I may be asked to stop them.      The risks, benefits, and side effects of these medicine(s) were explained to me. I agree that:    1. I will take part in other treatments as advised by my care team. This may be psychiatry or counseling, physical therapy, behavioral therapy, group treatment or a referral to a pain clinic. I will reduce or stop my medicine when my care team tells me to do so.  2. I will take my medicines as prescribed. I will not change the dose or schedule unless my care team tells me to. There will be no refills if I  run out early.   I may be contactedwithout warning and asked to complete a urine drug test or pill count at any time.   3. I will keep all my appointments, and understand this is part of the monitoring of opioids. My care team may require an office visit for EVERY opioid/controlled substance refill. If I miss appointments or don t follow instructions, my care  team may stop my medicine.  4. I will not ask other providers to prescribe controlled substances, and I will not accept controlled substances from other people. If I need another prescribed controlled substance for a new reason, I will tell my care team within 1 business day.  5. I will use one pharmacy to fill all of my controlled substance prescriptions, and it is up to me to make sure that I do not run out of my medicines on weekends or holidays. If my care team is willing to refill my opioid prescription without a visit, I must request refills only during office hours, refills may take up to 3 days to process, and it may take up to 5 to 7 days for my medicine to be mailed and ready at my pharmacy. Prescriptions will not be mailed anywhere except my pharmacy.        [id number]  Rev 12/18         Registration to scan to EHR                             Page 1 of 2               Controlled Substance Agreement Opioid        [facility]  02/18/19  Patient: Yusra Rivas  YOB: 1955  Medical Record Number: [id number]                                                                  6. I am responsible for my prescriptions. If the medicine/prescription is lost or stolen, it will not be replaced. I also agree not to share controlled substance medicines with anyone.  7. I agree to not use ANY illegal or recreational drugs. This includes marijuana, cocaine, bath salts or other drugs. I agree not to use alcohol unless my care team says I may.          I agree to give urine samples whenever asked. If I don t give a urine sample, the care team may stop my medicine.    8. If I enroll in the [place] Medical Marijuana program, I will tell my care team. I will also sign an agreement to share my medical records with my care team.   9. I will bring in my list of medicines (or my medicine bottles) each time I come to the clinic.   10. I will tell my care team right away if I become pregnant or  have a new medical problem treated outside of my regular clinic.  11. I understand that this medicine can affect my thinking and judgment. It may be unsafe for me to drive, use machinery and do dangerous tasks. I will not do any of these things until I know how the medicine affects me. If my dose changes, I will wait to see how it affects me. I will contact my care team if I have concerns about medicine side effects.    I understand that if I do not follow any of the conditions above, my prescriptions or treatment may be stopped.      I agree that my provider, clinic care team, and pharmacy may work with any city, state or federal law enforcement agency that investigates the misuse, sale, or other diversion of my controlled medicine. I will allow my provider to discuss my care with or share a copy of this agreement with any other treating provider, pharmacy or emergency room where I receive care. I agree to give up (waive) any right of privacy or confidentiality with respect to these consents.     I have read this agreement and have asked questions about anything I did not understand.      ________________________________________________________________________  Patient signature - Date/Time -  Yusra Rivas                                      ________________________________________________________________________  Witness signature                                                            ________________________________________________________________________  Provider signature - Denise Moss PA-C      [id number]  Rev 12/18         Registration to scan to EHR                         Page 2 of 2                   Controlled Substance Agreement Opioid           Page 1 of 2  Opioid Pain Medicines (also known as Narcotics)  What You Need to Know    What are opioids?   Opioids are pain medicines that must be prescribed by a doctor.  They are also known as narcotics.    Examples are:     morphine (MS Contin,  Jenna)    oxycodone (Oxycontin)    oxycodone and acetaminophen (Percocet)    hydrocodone and acetaminophen (Vicodin, Norco)     fentanyl patch (Duragesic)     hydromorphone (Dilaudid)     methadone     What do opioids do well?   Opioids are best for short-term pain after a surgery or injury. They also work well for cancer pain. Unlike other pain medicines, they do not cause liver or kidney failure or ulcers. They may help some people with long-lasting (chronic) pain.     What do opioids NOT do well?   Opioids never get rid of pain entirely, and they do not work well for most patients with chronic pain. Opioids do not reduce swelling, one of the causes of pain. They also don t work well for nerve pain.                           For informational purposes only.  Not to replace the advice of your care provider.  Copyright [facility]. All right reserved. [employer] [id number] 02/18.      Page 2 of 2    Risks and side effects   Talk to your doctor before you start or decide to keep taking one of these medicines. Side effects include:    Lowering your breathing rate enough to cause death    Overdose, including death, especially if taking higher than prescribed doses    Long-term opioid use    Worse depression symptoms; less pleasure in things you usually enjoy    Feeling tired or sluggish    Slower thoughts or cloudy thinking    Being more sensitive to pain over time; pain is harder to control    Trouble sleeping or restless sleep    Changes in hormone levels (for example, less testosterone)    Changes in sex drive or ability to have sex    Constipation    Unsafe driving    Itching and sweating    Feeling dizzy    Nausea, vomiting and dry mouth    What else should I know about opioids?  When someone takes opioids for too long or too often, they become dependent. This means that if you stop or reduce the medicine too quickly, you will have withdrawal symptoms.    Dependence is not the same as addiction.  Addiction is when people keep using a substance that harms their body, their mind or their relations with others. If you have a history of drug or alcohol abuse, taking opioids can cause a relapse.    Over time, opioids don t work as well. Most people will need higher and higher doses. The higher the dose, the more serious the side effects. We don t know the long-term effects of opioids.      Prescribed opioids aren't the best way to manage chronic pain    Other ways to manage pain include:      Ibuprofen or acetaminophen.  You should always try this first.      Treat health problems that may be causing pain.      acupuncture or massage, deep breathing, meditation, visual imagery, aromatherapy.      Use heat or ice at the pain site      Physical therapy and exercise      Stop smoking      See a counselor or therapist                                                  People who have used opioids for a long time may have a lower quality of life, worse depression, higher levels of pain and more visits to doctors.    Never share your opioids with others. Be sure to store opioids in a secure place, locked if possible.Young children can easily swallow them and overdose.     You can overdose on opioids.  Signs of overdose include decrease or loss of consciousness, slowed breathing, trouble waking and blue lips.  If someone is worried about overdose, they should call 911.    If you are at risk for overdose, you may get naloxone (Narcan, a medicine that reverses the effects of opioids.  If you overdose, a friend or family member can give you Narcan while waiting for the ambulance.  They need to know the signs of overdose and how to give Narcan.    While you're taking opioids:    Don't use alcohol or street drugs. Taking them together can cause death.    Don't take any of these medicines unless your doctor says its okay.  Taking these with opioids can cause death.    Benzodiazepines (such as lorazepam         or  diazepam)    Muscle relaxers (such as cyclobenzaprine)    sleeping pills    other opioids    Safe disposal of opioids  Find your area drug take-back program, your pharmacy mail-back program, buy a special disposal bag (such as Deterra) from your pharmacy or flush them down the toilet.  Use the guidelines at:  www.fda.gov/drugs/resourcesforyou

## 2019-02-18 NOTE — PATIENT INSTRUCTIONS
After Visit Instructions:     Thank you for coming to [facility] for your care. It is my goal to partner with you to help you reach your optimal state of health.     I am recommending multidisciplinary care at this time.  The focus of care will be to continue gradual rehabilitation and pain management with medication adjustments as needed.    Continue daily self-care, identifying contributing factors, and monitoring variations in pain level. Continue to integrate self-care into your life.      1. Schedule pain psychology assessment/visit: we can revisit in the future  2. Schedule physical therapy assessment/visit: we can revisit in the future. In the meantime, I would like you to try some gentle yoga or tim chi  3. Schedule follow-up with Denise Moss PA-C in 4 weeks. You will need to make this appointment.   4. Medication recommendations:   1. Increase Gabapentin to 900mg in AM 900mg in PM and 1200mg at HS for 4 days, then increase to 1200mg in AM, 900mg in PM and 1200mg at HS for 4 days, then take 1200mg three times day.   2. STOP the Norco. Will try Percocet 5/325mg 2-3/day.      Denise Moss PA-C  [facility]  [facility]    Contact information: [facility]  Clinic Number:  [phone]   Call this number with any questions about your care and for scheduling assistance. Calls are returned Monday through Friday between 8 AM and 4:30 PM. We usually get back to you within 2 business days depending on the issue/request.           Medication Refills Policy:    For non-narcotic medications, please your pharmacy directly to request a refill and the pharmacy will call the [facility] for authorization. Please allow 3-4 days for these refills.    For narcotic refills, call the nurse triage line and leave a message requesting your refill along with the name of the pharmacy that you use. Narcotic prescriptions will be mailed to  your pharmacy or you may pick them up at the clinic.  Please call 7-10 days before your refill is due  The above policy allows adequate time so that you do not run out of medication.    No Show - Late Cancellation - Late Arrival Policy  We believe regular attendance is key to your success in our program.    Any time you are unable to keep your appointment we ask that you call us at least 24 hours in advance to let us know. This will allow us to offer the appointment time to another patient. The following is our policy for missed appointments. This also applies to appointments cancelled with less than 24 hours notice.    After missing 3 appointments without calling first, we will cancel all of your future appointments at [facility].    At that point, you will not be able to resume services unless approved by your care team  We understand that unforseen circumstances arise, however, out of respect for all concerned and to provide this appointment to another patient, this policy will be enforced.    Please note that most follow up appointments are 30 minutes long. If you arrive late, your provider may not be able to see you for the entire 30 minutes. Please also note that if you arrive more than 15 minutes for any appointment, it may be rescheduled.

## 2019-03-08 ENCOUNTER — TELEPHONE (OUTPATIENT)
Dept: FAMILY MEDICINE | Facility: OTHER | Age: 64
End: 2019-03-08

## 2019-03-08 NOTE — TELEPHONE ENCOUNTER
"Returned patient's call and discussed current symptoms. She is having new, acute pain. States that she has been unable to sleep and having great difficulty ambulating. \"Feels like all of my bones are breaking.\"    She requesting further imaging be ordered by Denise Moss PA-C. I advised that due to the sudden onset of this new pain that she should be evaluated today by either primary care or the Emergency department. She was agreeable. She has a follow up appointment already scheduled with Denise on 3/18/2019.    Geni Simpson CMA    "

## 2019-03-08 NOTE — NURSING NOTE
Completed medication list with patient and instructed her to go to the primary care clinic to provide a urine specimen for the drug screen.     WHIT Dobson, RN-BC  Patient Care Supervisor/Care Coordinator  [facility]     Neuro - Non Pathway Diagnosis    Patient Characteristics:  Glioblastoma, Recurrent or Progressive, Nonsurgical Candidate, Systemic Therapy   Candidate  Disease Classification: Glioma  Disease Classification: Glioblastoma  Disease Status: Recurrent or Progressive  Treatment Classification: Nonsurgical Candidate  Treatment (Nonsurgical/Adjuvant): Systemic Therapy Candidate

## 2019-03-08 NOTE — TELEPHONE ENCOUNTER
Reason for Call:  Other call back    Detailed comments: is having issues with her back and hips. please call to advise.    Phone Number Patient can be reached at: Home number on file [phone] (home)    Best Time: asap    Can we leave a detailed message on this number? YES    Call taken on 3/8/2019 at 11:00 AM by Mary Kate Landeros
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
## 2019-03-14 ENCOUNTER — ONCOLOGY VISIT (OUTPATIENT)
Dept: ONCOLOGY | Facility: CLINIC | Age: 64
End: 2019-03-14
Payer: MEDICARE

## 2019-03-14 VITALS
HEIGHT: 67 IN | HEART RATE: 87 BPM | SYSTOLIC BLOOD PRESSURE: 124 MMHG | DIASTOLIC BLOOD PRESSURE: 62 MMHG | OXYGEN SATURATION: 94 % | RESPIRATION RATE: 18 BRPM | TEMPERATURE: 97.4 F | BODY MASS INDEX: 29.44 KG/M2

## 2019-03-14 DIAGNOSIS — Z17.1 MALIGNANT NEOPLASM OF OVERLAPPING SITES OF RIGHT BREAST IN FEMALE, ESTROGEN RECEPTOR NEGATIVE (H): Primary | ICD-10-CM

## 2019-03-14 DIAGNOSIS — C50.811 MALIGNANT NEOPLASM OF OVERLAPPING SITES OF RIGHT BREAST IN FEMALE, ESTROGEN RECEPTOR NEGATIVE (H): Primary | ICD-10-CM

## 2019-03-14 DIAGNOSIS — G89.4 CHRONIC PAIN DISORDER: ICD-10-CM

## 2019-03-14 DIAGNOSIS — C34.90 MALIGNANT NEOPLASM OF LUNG, UNSPECIFIED LATERALITY, UNSPECIFIED PART OF LUNG (H): ICD-10-CM

## 2019-03-14 LAB
ALBUMIN SERPL-MCNC: 3.4 G/DL (ref 3.4–5)
ALP SERPL-CCNC: 69 U/L (ref 40–150)
ALT SERPL W P-5'-P-CCNC: 26 U/L (ref 0–50)
ANION GAP SERPL CALCULATED.3IONS-SCNC: 7 MMOL/L (ref 3–14)
AST SERPL W P-5'-P-CCNC: 23 U/L (ref 0–45)
BASOPHILS # BLD AUTO: 0.1 10E9/L (ref 0–0.2)
BASOPHILS NFR BLD AUTO: 0.7 %
BILIRUB SERPL-MCNC: 0.4 MG/DL (ref 0.2–1.3)
BUN SERPL-MCNC: 18 MG/DL (ref 7–30)
CALCIUM SERPL-MCNC: 9.2 MG/DL (ref 8.5–10.1)
CHLORIDE SERPL-SCNC: 104 MMOL/L (ref 94–109)
CO2 SERPL-SCNC: 28 MMOL/L (ref 20–32)
CREAT SERPL-MCNC: 0.79 MG/DL (ref 0.52–1.04)
DIFFERENTIAL METHOD BLD: NORMAL
EOSINOPHIL NFR BLD AUTO: 3.3 %
ERYTHROCYTE [DISTWIDTH] IN BLOOD BY AUTOMATED COUNT: 12.1 % (ref 10–15)
GFR SERPL CREATININE-BSD FRML MDRD: 79 ML/MIN/{1.73_M2}
GLUCOSE SERPL-MCNC: 90 MG/DL (ref 70–99)
HCT VFR BLD AUTO: 40.3 % (ref 35–47)
HGB BLD-MCNC: 13.3 G/DL (ref 11.7–15.7)
IMM GRANULOCYTES # BLD: 0 10E9/L (ref 0–0.4)
IMM GRANULOCYTES NFR BLD: 0.6 %
LYMPHOCYTES # BLD AUTO: 2.8 10E9/L (ref 0.8–5.3)
LYMPHOCYTES NFR BLD AUTO: 40.3 %
MCH RBC QN AUTO: 30.6 PG (ref 26.5–33)
MCHC RBC AUTO-ENTMCNC: 33 G/DL (ref 31.5–36.5)
MCV RBC AUTO: 93 FL (ref 78–100)
MONOCYTES # BLD AUTO: 0.8 10E9/L (ref 0–1.3)
MONOCYTES NFR BLD AUTO: 11 %
NEUTROPHILS # BLD AUTO: 3.1 10E9/L (ref 1.6–8.3)
NEUTROPHILS NFR BLD AUTO: 44.1 %
NRBC # BLD AUTO: 0 10*3/UL
NRBC BLD AUTO-RTO: 0 /100
PLATELET # BLD AUTO: 324 10E9/L (ref 150–450)
POTASSIUM SERPL-SCNC: 3.9 MMOL/L (ref 3.4–5.3)
PROT SERPL-MCNC: 7.8 G/DL (ref 6.8–8.8)
RBC # BLD AUTO: 4.35 10E12/L (ref 3.8–5.2)
SODIUM SERPL-SCNC: 139 MMOL/L (ref 133–144)
WBC # BLD AUTO: 7 10E9/L (ref 4–11)

## 2019-03-14 PROCEDURE — 99214 OFFICE O/P EST MOD 30 MIN: CPT | Performed by: NURSE PRACTITIONER

## 2019-03-14 PROCEDURE — 85025 COMPLETE CBC W/AUTO DIFF WBC: CPT | Performed by: INTERNAL MEDICINE

## 2019-03-14 PROCEDURE — 36415 COLL VENOUS BLD VENIPUNCTURE: CPT | Performed by: INTERNAL MEDICINE

## 2019-03-14 PROCEDURE — 80053 COMPREHEN METABOLIC PANEL: CPT | Performed by: INTERNAL MEDICINE

## 2019-03-14 ASSESSMENT — PAIN SCALES - GENERAL: PAINLEVEL: MODERATE PAIN (4)

## 2019-03-14 NOTE — NURSING NOTE
DISCHARGE PLAN:  Next appointments: See patient instruction section  Departure Mode: Ambulatory  Accompanied by: self  3 minutes for nursing discharge (face to face time)     Yusra Rivas is here today for Oncology follow up.  Writing nurse seen patient after Medical Oncology appointment to address questions/concerns/coordinate care. Patient to follow up in 1 year with labs (cbc/cmp).  Patient ambulated by nurse to  to schedule follow up and/or lab appointments.  Imaging scheduled if ordered.  See patient instructions and Oncologist's Progress note for further details. Questions and concerns addressed to patient's satisfaction. Patient verbalized and demonstrated understanding of plan.  Contact information provided and patient is encouraged to call with any that arise in the interim of care.    Jeffry Smith, RN, BSN, OCN   Oncology Care Coordinator RN  [facility]  [phone]  3/14/2019, 1:51 PM
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
## 2019-03-14 NOTE — PATIENT INSTRUCTIONS
Please follow up with Dr. Arroyo in 1 year with labs.      Office visit follow up with Dr. Arroyo Date/Time:   Please come 20-30 minutes early for labs    If you have any questions or concerns please feel free to call.    If you need to reschedule please call:  Clinic or Lab Appointment - [phone]  Infusion - [phone]  Imaging - [phone]    Jeffry Smith RN, BSN, OCN  [facility]   Oncology/Hematology Care Coordinator RN  [facility]  [phone]

## 2019-03-14 NOTE — NURSING NOTE
"Oncology Rooming Note    March 14, 2019 1:56 PM   Yusra Rivas is a 63 year old female who presents for:    Chief Complaint   Patient presents with     Oncology Clinic Visit     1 year follow up-Malignant neoplasm of lung, unspecified laterality, unspecified part of lung     Results     Labs today     Initial Vitals: /62   Pulse 87   Temp 97.4  F (36.3  C)   Resp 18   Ht 1.702 m (5' 7\")   SpO2 94%   BMI 29.44 kg/m   Estimated body mass index is 29.44 kg/m  as calculated from the following:    Height as of this encounter: 1.702 m (5' 7\").    Weight as of 2/18/19: 85.3 kg (188 lb). Body surface area is 2.01 meters squared.  Moderate Pain (4) Comment: Data Unavailable   No LMP recorded. Patient is postmenopausal.  Allergies reviewed: Yes  Medications reviewed: Yes    Medications: Medication refills not needed today.  Pharmacy name entered into [employer]:    [facility] [place] [place] [facility] [address] [facility] [place] [place] [facility] [address] [facility]    Clinical concerns: Port area. Concerns reviewed with NP.    5 minutes for nursing intake (face to face time)     Heidi DOYLE CMA              "

## 2019-03-15 ENCOUNTER — VIRTUAL VISIT (OUTPATIENT)
Dept: ONCOLOGY | Facility: CLINIC | Age: 64
End: 2019-03-15
Attending: INTERNAL MEDICINE
Payer: MEDICARE

## 2019-03-15 DIAGNOSIS — Z72.0 TOBACCO ABUSE DISORDER: Primary | ICD-10-CM

## 2019-03-15 PROCEDURE — 40000114 ZZH STATISTIC NO CHARGE CLINIC VISIT

## 2019-03-15 NOTE — PROGRESS NOTES
[facility]    CHIEF COMPLAINT: Pain  -Low back pain with radiation to the right leg    INTERVAL HISTORY:  Last seen on 2/18/2019.        Recommendations/plan at the last visit included:  1.  Physical Therapy:  I do think she would benefit from pool therapy, but we can revisit this in the spring. I would recommend that she try gentle yoga or tim chi.   2.  Clinical Health Psychologist to address issues of relaxation, behavorial change, coping style, and other factors important to improvement: I think she would benefit from this, however transportation is limited and we are limited on providers in the area. We can revisit this in the future.   3.  Diagnostic Studies:  None at this time  4.  Medication Management:               1. I would like to try a doublet for nerve pain, but she is hesitant to add anything due to her stomach issues. Therefore, we'll increase her Gabapentin to 1200mg TID. I think Nortriptyline or Cymbalta would be good choices for her. We will discuss this after she meets with gastroenterology.                2. Stop Norco. Will try Percocet 5/325 2-3/day.   5.  Urine toxicology screen: Due in 05/2019    1. 6.  Potential procedures:Consider lumbar transforaminal epidural steroid injection right L3-4  2. Repeat hip bursa injection if needed  7. Other treatments: We could consider a lidocaine ointment in the future   8. Recommendations to PCP: see above  9. CSA signed with me today.      Follow up: 4 Weeks     Since her last visit, Yusra Rivas reports:  - She had increasing pain. She went to the ER due to worsening low back into tailbone, buttocks and down bilateral legs. She even needed help getting out of bed. She was recommended to go inpatient. She has dogs at home, therefore was unable to be admitted. She was given Morphine and Dilaudid while there. She did not get discharged with any medications. That pain is now starting to go back to her baseline. Nothing else new  since her last visit. It is hard to say if the Percocet was better due to the episode of increasing pain.       - She was able to increase her Gabapentin to 1200mg TID and is tolerating it well. She would like some trigger point injections.     Pain Information:   Pain quality: Aching, Burning, Exhausting, Gnawing, Miserable, Nagging, Numb, Penetrating, Sharp, Shooting, Stabbing, Tender, Throbbing, Tiring and Unbearable    Pain rating: intensity ranges from 6/10 to 10/10, and averages 8/10 on a 0-10 scale.   Pain today 6/10      Annual Controlled Substance Agreement/UDS:  UDS: 5/18/2018  CSA: 2/18/2019    CURRENT RELEVANT PAIN MEDICATIONS:   Percocet 5/325 2-3/day  Gabapentin 1200mg TID  Flexeril 10mg, uses rarely at bedtime (makes tired)  Robaxin 750mg, uses rarely (makes tired-not super helpful with spasms)    Patient is using the medication as prescribed:  YES  Is your medication helpful? NO   Medication side effects? no side effect    Previous Medications: (H--helped; HI--Helped initially; SWH-- somewhat helpful, NH--No help; W--worse; SE--side effects)   Opiates: Morphine SE constipation, Percocet no more helpful than hydrocodone  NSAIDS: Indomethacin SE GI problems  Muscle Relaxants: Flexeril SE sedation, Robaxin SE sedation  Anti-migraine mediations: none  Anti-depressants: Amitriptyline SE gassy  Sleep aids: none  Anxiolytics: none  Neuropathics: Gabapentin H    Topicals: none  Other medications not covered above: Tylenol NH    Past Pain Treatments:  Pain Clinic:   Yes, she was at on in [place] after the Brain injury.   PT: Yes, was done last summer. No pool therapy  Psychologist: Yes, has seen Santo  Relaxation techniques/biofeedback: No  Chiropractor: No  Acupuncture: Yes, made pain worse  Pharmacotherapy:               Opioids: Yes                Non-opioids:    Yes   TENs Unit: No  Injections: Yes, - 11/5/18 lumbar TFESI right L5-S1  - 12/12/16 caudal epidural steroid injection  - 8/28/17 right L5-S1  facet and right SI joint injection  - 1/22/18 SCS trial  - 6/4/18 right hip injection (Dr Christensen)  - 7/25/18 right hip bursa injection, lumbar TPI  Self-care:   Yes, hot showers, heated blanket  Surgeries related to pain: Yes, fusion and jaskaran placement.    [place] Board of Pharmacy Data Base Reviewed:    YES; As expected, no concern for misuse/abuse of controlled medications based on this report.        THE 4 As OF OPIOID MAINTENANCE ANALGESIA    Analgesia: Is pain relief clinically significant? Unsure due to recent exacerbation   Activity: Is patient functional and able to perform Activities of Daily Living? NO-this is improving  Adverse effects: Is patient free from adverse side effects from opiates? YES   Adherence to Rx protocol: Is patient adhering to Controlled Substance Agreement and taking medications ONLY as ordered? YES       Is Narcan prescribed for opiate use >50 MME daily? N/A      Daily MME: 15-22.5    Medications:  Current Outpatient Medications   Medication Sig Dispense Refill     Cyanocobalamin (VITAMIN B-12 PO) Take 1,000 mcg by mouth daily         MG capsule TAKE ONE CAPSULE BY MOUTH EVERY DAY 30 capsule 0     gabapentin (NEURONTIN) 300 MG capsule Take 3 capsules (900 mg) by mouth 3 times daily 270 capsule 3     HYDROcodone-acetaminophen (NORCO) 5-325 MG tablet Take 1 tablet by mouth every 6 hours as needed for pain Max of 3 tablets per day on bad days. This is a 30 day supply.  Dispense on/after 1/23/19.  To start on 1/25/19 (Patient not taking: Reported on 3/14/2019) 75 tablet 0     metoprolol tartrate (LOPRESSOR) 50 MG tablet TAKE ONE TABLET BY MOUTH TWICE A DAY 60 tablet 0     Multiple Vitamins-Minerals (I-GOLDIE) TABS TAKE ONE TABLET BY MOUTH EVERY DAY 60 tablet 8     oxyCODONE-acetaminophen (PERCOCET) 5-325 MG tablet Take 1 tablet by mouth every 6 hours as needed for pain 75 tablet 0     polyethylene glycol (MIRALAX/GLYCOLAX) powder STIR 17 GM OF POWDER (SEE DEXTER INSIDE CAP) IN 8-OZ  "OF LIQUID UNTIL COMPLETELY DISSOLVED. DRINK THE SOLUTION DAILY OR AS DIRECTED. 527 g 0     Pyridoxine HCl (VITAMIN B6 PO) Take 100 mg by mouth daily        ranitidine (ZANTAC) 150 MG tablet Take 150 mg by mouth 2 times daily       VITAMIN D3 1000 UNITS tablet TAKE TWO TABLETS BY MOUTH EVERY  tablet 3       Review of Systems: A 10-point review of systems was negative, with the exception of chronic pain issues.      Social History: Reviewed; unchanged from previous consultation.      Family history: Reviewed; unchanged from previous consultation.     PHYSICAL EXAM:     /62   Temp 98.4  F (36.9  C) (Temporal)   Ht 1.702 m (5' 7\")   BMI 29.44 kg/m        Constitutional: healthy, alert and no distress  HEENT: Head atraumatic, normocephalic. Eyes without conjunctival injection or jaundice. Neck supple. No obvious neck masses.  Skin: No rash, lesions, or petechiae of exposed skin.   Psychiatric/mental status: Alert, without lethargy or stupor. Appropriate affect. Mood normal.   Neurologic exam: Walking with a cane today    DIAGNOSTIC TESTS:  Imaging Studies:   No new imaging to review today    Assessment:  Yusra Rivas is a 63 year old female who presents today for follow up regarding her:    1.  Chronic pain syndrome  2.  Chronic bilateral low back pain with radiation to bilateral legs  3.  Myofascial pain/dystonia  4.  Right hip and groin pain    She had a severe pain flare about 1 1/2 weeks ago. She is now slowly coming back to her baseline. She is wondering if she should see a dystonia specialist. This was recommended to her many years ago. She is wondering if there is something else that she can take for her muscle spasms. She is not getting any benefit from the Flexeril or Robaxin.     Plan:    Diagnosis reviewed, treatment option addressed, and risk/benifits discussed.  Self-care instructions given.  I am recommending a multidisciplinary treatment plan to help this patient better manage pain.  "     1. Physical Therapy:  NO   2. Clinical Health Psychologist:  NO    3. Diagnostic Studies:  None at this time  4. Medication Management:    1. Continue Gabapentin 1200mg TID. Refilled today  2. Stop Flexeril and Robaxin. Try Tizanidine 2mg, 1-2 tabs TID PRN muscle spasms/pain  5. Further procedures recommended: TPI, patient would like to have these done with me. She is aware that I am not doing them yet. Will call her when I will be starting them.       Follow up with this provider:  4 Weeks     Total time spent face to face was 20 minutes and more than 50% of face to face time was spent in counseling and/or coordination of care regarding the diagnosis and recommendations above.      Denise Moss PA-C   [facility]

## 2019-03-15 NOTE — PROGRESS NOTES
"TOBACCO TREATMENT FOLLOW UP VISIT    Subjective:      Patient is a 63 year old White female and was contacted to assess progress in Tobacco Treatment Program for Nicotine Dependence 3/15/2019 by the Tobacco Treatment Team. Patient  reports that she quit smoking about 4 months ago. Her smoking use included cigarettes. She has a 52.50 pack-year smoking history. she has never used smokeless tobacco.     Patient has had continued success remaining tobacco free. We visit today to follow up with progress after ending cessation medication. She reports having days of severe pain and thoughts of smoking to distract herself from the pain. \"Even if I could not think about my pain for 30 seconds, smoking would be worth it in that moment.\" She maintains that she will not give into smoking as she does not want to go through the process of quitting again. Patient reports having the nicotine lozenge if needed, but does not want to put nicotine back into her system. Patient feels confident remaining tobacco free.        Information:      Agreed to Participate in Program - Yes  Session Number: 10  Proactive Outreach- Yes  Smoking Status:  Not at all  Has attempted to quit in the last 30 days:  Yes  Current Cessation Medication Being Used - Not currently using cessation medication  Withdrawal Symptoms: No withdrawal symptoms reported  Nicotine Product Used - Cigarettes  Amount of Use (CPD) - 0  Time to First Use -  Not currently smoking   Time of Last Cigarette: More than 1 month ago to 1 year  Readiness (0-10) - 10  Barriers to quit- No Barriers       Summary of visit:      Yusra Rivas is still smoking/using tobacco: No  These MI interventions were used: Supported Autonomy, Collaboration, Evocation, Open-ended questions, Reflections: simple and complex and Change talk (evoked)  We discussed: Benefits of quitting  Ways to reduce stress to prevent relapse  Rewards for quitting  Total abstinence  Motivation Level Ask questions and " Cooperative  Patient is ready to quit smoking or continue to stay 100% smoke-free.  Advice to quit and impact of smoking provided to patient:   Use of cessation medications (lozenge) to avoid smoking.         Plan:      Quit Date:  10/17/18  Patient will use nicotine lozenge if necessary to remain tobacco free. She feels at this time there are no barriers to staying quit.     MTM Consult Completed: Yes    Follow up arranged: No, program complete    Amount of time spent counseling: 15 minutes        CORNELIUS Terrazas  Tobacco Treatment Specialist

## 2019-03-18 ENCOUNTER — OFFICE VISIT (OUTPATIENT)
Dept: PODIATRY | Facility: CLINIC | Age: 64
End: 2019-03-18
Payer: MEDICARE

## 2019-03-18 VITALS
HEIGHT: 67 IN | TEMPERATURE: 98.4 F | SYSTOLIC BLOOD PRESSURE: 122 MMHG | DIASTOLIC BLOOD PRESSURE: 62 MMHG | BODY MASS INDEX: 29.44 KG/M2

## 2019-03-18 DIAGNOSIS — M54.41 CHRONIC BILATERAL LOW BACK PAIN WITH BILATERAL SCIATICA: ICD-10-CM

## 2019-03-18 DIAGNOSIS — G89.4 CHRONIC PAIN SYNDROME: Primary | ICD-10-CM

## 2019-03-18 DIAGNOSIS — M25.551 RIGHT HIP PAIN: ICD-10-CM

## 2019-03-18 DIAGNOSIS — G89.29 CHRONIC BILATERAL LOW BACK PAIN WITH BILATERAL SCIATICA: ICD-10-CM

## 2019-03-18 DIAGNOSIS — M79.18 MYOFASCIAL PAIN: ICD-10-CM

## 2019-03-18 DIAGNOSIS — M54.42 CHRONIC BILATERAL LOW BACK PAIN WITH BILATERAL SCIATICA: ICD-10-CM

## 2019-03-18 PROCEDURE — 99213 OFFICE O/P EST LOW 20 MIN: CPT | Performed by: PHYSICIAN ASSISTANT

## 2019-03-18 RX ORDER — TIZANIDINE 2 MG/1
TABLET ORAL
Qty: 90 TABLET | Refills: 0 | Status: SHIPPED | OUTPATIENT
Start: 2019-03-18 | End: 2019-04-15

## 2019-03-18 RX ORDER — GABAPENTIN 300 MG/1
1200 CAPSULE ORAL 3 TIMES DAILY
Qty: 360 CAPSULE | Refills: 1 | Status: SHIPPED | OUTPATIENT
Start: 2019-03-18 | End: 2019-06-19

## 2019-03-18 RX ORDER — OXYCODONE AND ACETAMINOPHEN 5; 325 MG/1; MG/1
1 TABLET ORAL EVERY 6 HOURS PRN
Qty: 75 TABLET | Refills: 0 | Status: SHIPPED | OUTPATIENT
Start: 2019-03-18 | End: 2019-04-19

## 2019-03-18 ASSESSMENT — PAIN SCALES - GENERAL: PAINLEVEL: SEVERE PAIN (6)

## 2019-03-18 NOTE — PROGRESS NOTES
"/62   Temp 98.4  F (36.9  C) (Temporal)   Ht 1.702 m (5' 7\")   BMI 29.44 kg/m    Body mass index is 29.44 kg/m .  5' 7\"  0 lbs 0 oz        Geni Simpson MA on 3/18/2019 at 2:16 PM    "

## 2019-03-18 NOTE — PATIENT INSTRUCTIONS
After Visit Instructions:     Thank you for coming to [facility] for your care. It is my goal to partner with you to help you reach your optimal state of health.     I am recommending multidisciplinary care at this time.  The focus of care will be to continue gradual rehabilitation and pain management with medication adjustments as needed.    Continue daily self-care, identifying contributing factors, and monitoring variations in pain level. Continue to integrate self-care into your life.      1. Schedule physical therapy assessment/visit Consider pool therapy  2. Schedule follow-up with Denise Moss PA-C in 4 weeks. You will need to make this appointment.    3. Procedures recommended: I will call you in two weeks about the trigger point injections    4. Medication recommendations:   1. Continue Percocet  2. Continue Gabapentin 1200mg three times a day  3. Stop Flexeril and Robaxin and try Tizanidine 2mg, take 1-2 tablets three times a day as needed for muscle pain/spasms.      Denise Moss PA-C  [facility]    Contact information: [facility]  Clinic Number:  [phone]   Call this number with any questions about your care and for scheduling assistance. Calls are returned Monday through Friday between 8 AM and 4:30 PM. We usually get back to you within 2 business days depending on the issue/request.           Medication Refills Policy:    For non-narcotic medications, please your pharmacy directly to request a refill and the pharmacy will call the Pain Management Center for authorization. Please allow 3-4 days for these refills.    For narcotic refills, call the nurse triage line and leave a message requesting your refill along with the name of the pharmacy that you use. Narcotic prescriptions will be mailed to your pharmacy or you may pick them up at the clinic.  Please call 7-10 days before your refill is due  The  above policy allows adequate time so that you do not run out of medication.    No Show - Late Cancellation - Late Arrival Policy  We believe regular attendance is key to your success in our program.    Any time you are unable to keep your appointment we ask that you call us at least 24 hours in advance to let us know. This will allow us to offer the appointment time to another patient. The following is our policy for missed appointments. This also applies to appointments cancelled with less than 24 hours notice.    After missing 3 appointments without calling first, we will cancel all of your future appointments at [facility].    At that point, you will not be able to resume services unless approved by your care team  We understand that unforseen circumstances arise, however, out of respect for all concerned and to provide this appointment to another patient, this policy will be enforced.    Please note that most follow up appointments are 30 minutes long. If you arrive late, your provider may not be able to see you for the entire 30 minutes. Please also note that if you arrive more than 15 minutes for any appointment, it may be rescheduled.

## 2019-03-18 NOTE — LETTER
3/18/2019         RE: Yusra Rivas  [address]  [place] [address]        Dear Colleague,    Thank you for referring your patient, Yusra Rivas, to the [facility]. Please see a copy of my visit note below.    [facility]    CHIEF COMPLAINT: Pain  -Low back pain with radiation to the right leg    INTERVAL HISTORY:  Last seen on 2/18/2019.        Recommendations/plan at the last visit included:  1.  Physical Therapy:  I do think she would benefit from pool therapy, but we can revisit this in the spring. I would recommend that she try gentle yoga or tim chi.   2.  Clinical Health Psychologist to address issues of relaxation, behavorial change, coping style, and other factors important to improvement: I think she would benefit from this, however transportation is limited and we are limited on providers in the area. We can revisit this in the future.   3.  Diagnostic Studies:  None at this time  4.  Medication Management:               1. I would like to try a doublet for nerve pain, but she is hesitant to add anything due to her stomach issues. Therefore, we'll increase her Gabapentin to 1200mg TID. I think Nortriptyline or Cymbalta would be good choices for her. We will discuss this after she meets with gastroenterology.                2. Stop Norco. Will try Percocet 5/325 2-3/day.   5.  Urine toxicology screen: Due in 05/2019    1. 6.  Potential procedures:Consider lumbar transforaminal epidural steroid injection right L3-4  2. Repeat hip bursa injection if needed  7. Other treatments: We could consider a lidocaine ointment in the future   8. Recommendations to PCP: see above  9. CSA signed with me today.      Follow up: 4 Weeks     Since her last visit, Yusra Rivas reports:  - She had increasing pain. She went to the ER due to worsening low back into tailbone, buttocks and down bilateral legs. She even needed help getting out of bed. She was recommended to go  inpatient. She has dogs at home, therefore was unable to be admitted. She was given Morphine and Dilaudid while there. She did not get discharged with any medications. That pain is now starting to go back to her baseline. Nothing else new since her last visit. It is hard to say if the Percocet was better due to the episode of increasing pain.       - She was able to increase her Gabapentin to 1200mg TID and is tolerating it well. She would like some trigger point injections.     Pain Information:   Pain quality: Aching, Burning, Exhausting, Gnawing, Miserable, Nagging, Numb, Penetrating, Sharp, Shooting, Stabbing, Tender, Throbbing, Tiring and Unbearable    Pain rating: intensity ranges from 6/10 to 10/10, and averages 8/10 on a 0-10 scale.   Pain today 6/10      Annual Controlled Substance Agreement/UDS:  UDS: 5/18/2018  CSA: 2/18/2019    CURRENT RELEVANT PAIN MEDICATIONS:   Percocet 5/325 2-3/day  Gabapentin 1200mg TID  Flexeril 10mg, uses rarely at bedtime (makes tired)  Robaxin 750mg, uses rarely (makes tired-not super helpful with spasms)    Patient is using the medication as prescribed:  YES  Is your medication helpful? NO   Medication side effects? no side effect    Previous Medications: (H--helped; HI--Helped initially; SWH-- somewhat helpful, NH--No help; W--worse; SE--side effects)   Opiates: Morphine SE constipation, Percocet no more helpful than hydrocodone  NSAIDS: Indomethacin SE GI problems  Muscle Relaxants: Flexeril SE sedation, Robaxin SE sedation  Anti-migraine mediations: none  Anti-depressants: Amitriptyline SE gassy  Sleep aids: none  Anxiolytics: none  Neuropathics: Gabapentin H    Topicals: none  Other medications not covered above: Tylenol NH    Past Pain Treatments:  Pain Clinic:   Yes, she was at on in [place] after the Brain injury.   PT: Yes, was done last summer. No pool therapy  Psychologist: Yes, has seen Santo  Relaxation techniques/biofeedback: No  Chiropractor: No  Acupuncture:  Yes, made pain worse  Pharmacotherapy:               Opioids: Yes                Non-opioids:    Yes   TENs Unit: No  Injections: Yes, - 11/5/18 lumbar TFESI right L5-S1  - 12/12/16 caudal epidural steroid injection  - 8/28/17 right L5-S1 facet and right SI joint injection  - 1/22/18 SCS trial  - 6/4/18 right hip injection (Dr Christensen)  - 7/25/18 right hip bursa injection, lumbar TPI  Self-care:   Yes, hot showers, heated blanket  Surgeries related to pain: Yes, fusion and jaskaran placement.    [place] Board of Pharmacy Data Base Reviewed:    YES; As expected, no concern for misuse/abuse of controlled medications based on this report.        THE 4 As OF OPIOID MAINTENANCE ANALGESIA    Analgesia: Is pain relief clinically significant? Unsure due to recent exacerbation   Activity: Is patient functional and able to perform Activities of Daily Living? NO-this is improving  Adverse effects: Is patient free from adverse side effects from opiates? YES   Adherence to Rx protocol: Is patient adhering to Controlled Substance Agreement and taking medications ONLY as ordered? YES       Is Narcan prescribed for opiate use >50 MME daily? N/A      Daily MME: 15-22.5    Medications:  Current Outpatient Medications   Medication Sig Dispense Refill     Cyanocobalamin (VITAMIN B-12 PO) Take 1,000 mcg by mouth daily         MG capsule TAKE ONE CAPSULE BY MOUTH EVERY DAY 30 capsule 0     gabapentin (NEURONTIN) 300 MG capsule Take 3 capsules (900 mg) by mouth 3 times daily 270 capsule 3     HYDROcodone-acetaminophen (NORCO) 5-325 MG tablet Take 1 tablet by mouth every 6 hours as needed for pain Max of 3 tablets per day on bad days. This is a 30 day supply.  Dispense on/after 1/23/19.  To start on 1/25/19 (Patient not taking: Reported on 3/14/2019) 75 tablet 0     metoprolol tartrate (LOPRESSOR) 50 MG tablet TAKE ONE TABLET BY MOUTH TWICE A DAY 60 tablet 0     Multiple Vitamins-Minerals (I-GOLDIE) TABS TAKE ONE TABLET BY MOUTH EVERY  "DAY 60 tablet 8     oxyCODONE-acetaminophen (PERCOCET) 5-325 MG tablet Take 1 tablet by mouth every 6 hours as needed for pain 75 tablet 0     polyethylene glycol (MIRALAX/GLYCOLAX) powder STIR 17 GM OF POWDER (SEE DEXTER INSIDE CAP) IN 8-OZ OF LIQUID UNTIL COMPLETELY DISSOLVED. DRINK THE SOLUTION DAILY OR AS DIRECTED. 527 g 0     Pyridoxine HCl (VITAMIN B6 PO) Take 100 mg by mouth daily        ranitidine (ZANTAC) 150 MG tablet Take 150 mg by mouth 2 times daily       VITAMIN D3 1000 UNITS tablet TAKE TWO TABLETS BY MOUTH EVERY  tablet 3       Review of Systems: A 10-point review of systems was negative, with the exception of chronic pain issues.      Social History: Reviewed; unchanged from previous consultation.      Family history: Reviewed; unchanged from previous consultation.     PHYSICAL EXAM:     /62   Temp 98.4  F (36.9  C) (Temporal)   Ht 1.702 m (5' 7\")   BMI 29.44 kg/m         Constitutional: healthy, alert and no distress  HEENT: Head atraumatic, normocephalic. Eyes without conjunctival injection or jaundice. Neck supple. No obvious neck masses.  Skin: No rash, lesions, or petechiae of exposed skin.   Psychiatric/mental status: Alert, without lethargy or stupor. Appropriate affect. Mood normal.   Neurologic exam: Walking with a cane today    DIAGNOSTIC TESTS:  Imaging Studies:   No new imaging to review today    Assessment:  Yusra Rivas is a 63 year old female who presents today for follow up regarding her:    1.  Chronic pain syndrome  2.  Chronic bilateral low back pain with radiation to bilateral legs  3.  Myofascial pain/dystonia  4.  Right hip and groin pain    She had a severe pain flare about 1 1/2 weeks ago. She is now slowly coming back to her baseline. She is wondering if she should see a dystonia specialist. This was recommended to her many years ago. She is wondering if there is something else that she can take for her muscle spasms. She is not getting any benefit from the " "Flexeril or Robaxin.     Plan:    Diagnosis reviewed, treatment option addressed, and risk/benifits discussed.  Self-care instructions given.  I am recommending a multidisciplinary treatment plan to help this patient better manage pain.      1. Physical Therapy:  NO   2. Clinical Health Psychologist:  NO    3. Diagnostic Studies:  None at this time  4. Medication Management:    1. Continue Gabapentin 1200mg TID. Refilled today  2. Stop Flexeril and Robaxin. Try Tizanidine 2mg, 1-2 tabs TID PRN muscle spasms/pain  5. Further procedures recommended: TPI, patient would like to have these done with me. She is aware that I am not doing them yet. Will call her when I will be starting them.       Follow up with this provider:  4 Weeks     Total time spent face to face was 20 minutes and more than 50% of face to face time was spent in counseling and/or coordination of care regarding the diagnosis and recommendations above.      Denise Moss PA-C   [facility]          /62   Temp 98.4  F (36.9  C) (Temporal)   Ht 1.702 m (5' 7\")   BMI 29.44 kg/m     Body mass index is 29.44 kg/m .  5' 7\"  0 lbs 0 oz        Geni Simpson MA on 3/18/2019 at 2:16 PM      Again, thank you for allowing me to participate in the care of your patient.        Sincerely,        Denise Moss PA-C    "

## 2019-04-03 DIAGNOSIS — K59.01 SLOW TRANSIT CONSTIPATION: ICD-10-CM

## 2019-04-03 NOTE — TELEPHONE ENCOUNTER
Routing refill request to provider for review/approval because:  Janneth given x1 and patient did not follow up, please advise  Patient needs to be seen because it has been more than 1 year since last office visit.    MYLENE SoaresN, RN  [facility]

## 2019-04-03 NOTE — TELEPHONE ENCOUNTER
"Requested Prescriptions   Pending Prescriptions Disp Refills      MG capsule [Pharmacy Med Name: DOK 100MG CAPS] 100 capsule 0    Last Written Prescription Date:  1/10/19  Last Fill Quantity: 30,  # refills: 0   Last office visit: 8/23/2016 with prescribing provider:  12/5/17   Future Office Visit:   Next 5 appointments (look out 90 days)    Apr 19, 2019  2:00 PM CDT  Return Visit with Denise Moss PA-C  [facility] [address] [place] [address] [id number] [phone]        Sig: TAKE ONE CAPSULE BY MOUTH ONCE DAILY --NEED FOLLOW UP WITH DR SOW    Laxatives Protocol Failed - 4/3/2019 11:20 AM       Failed - Recent (12 mo) or future (30 days) visit within the authorizing provider's specialty    Patient had office visit in the last 12 months or has a visit in the next 30 days with authorizing provider or within the authorizing provider's specialty.  See \"Patient Info\" tab in inbasket, or \"Choose Columns\" in Meds & Orders section of the refill encounter.             Passed - Patient is age 6 or older       Passed - Medication is active on med list        "

## 2019-04-05 ENCOUNTER — TELEPHONE (OUTPATIENT)
Dept: PODIATRY | Facility: CLINIC | Age: 64
End: 2019-04-05

## 2019-04-05 DIAGNOSIS — G89.4 CHRONIC PAIN SYNDROME: ICD-10-CM

## 2019-04-05 DIAGNOSIS — M79.18 MYOFASCIAL PAIN: ICD-10-CM

## 2019-04-05 RX ORDER — TIZANIDINE 2 MG/1
TABLET ORAL
Qty: 90 TABLET | Refills: 0 | Status: CANCELLED | OUTPATIENT
Start: 2019-04-05

## 2019-04-05 NOTE — TELEPHONE ENCOUNTER
Left message with patient's significant other. I let him know who was calling and that I would reach out to her again next week.    Call was regarding trigger point injections.    Geni Simpson [facility]

## 2019-04-06 RX ORDER — DOCUSATE SODIUM 100 MG/1
100 CAPSULE, LIQUID FILLED ORAL DAILY
Qty: 100 CAPSULE | Refills: 0 | Status: SHIPPED | OUTPATIENT
Start: 2019-04-06 | End: 2019-06-12

## 2019-04-08 NOTE — TELEPHONE ENCOUNTER
Tizanidine 2 MG       Last Written Prescription Date:  3/18/19  Last Fill Quantity: 90,   # refills: 0  Last Office Visit: 12/5/17  Future Office visit:    Next 5 appointments (look out 90 days)    Apr 19, 2019  2:00 PM CDT  Return Visit with Denise Moss PA-C  [facility] [address] [place] [id number] [phone]           Routing refill request to provider for review/approval because:  Drug not on the [facility], [facility] or [place] refill protocol or controlled substance

## 2019-04-08 NOTE — TELEPHONE ENCOUNTER
Patient calling and states she is going to a different clinic now and Dr Beckett is not her primary.

## 2019-04-09 NOTE — TELEPHONE ENCOUNTER
Spoke to patient and let her know that Denise will be doing TPIs within the next couple weeks. Her next scheduled appointment is 4/19/19. She was optimistic .    Geni Simpson CMA

## 2019-04-13 DIAGNOSIS — G89.4 CHRONIC PAIN SYNDROME: ICD-10-CM

## 2019-04-13 DIAGNOSIS — M79.18 MYOFASCIAL PAIN: ICD-10-CM

## 2019-04-15 RX ORDER — TIZANIDINE 2 MG/1
TABLET ORAL
Qty: 90 TABLET | Refills: 1 | Status: SHIPPED | OUTPATIENT
Start: 2019-04-15 | End: 2019-06-14

## 2019-04-15 NOTE — TELEPHONE ENCOUNTER
Prescription signed. Sent to [facility].     Denise Moss PA-C on 4/15/2019 at 8:10 AM

## 2019-04-17 NOTE — PROGRESS NOTES
[facility]    CHIEF COMPLAINT: Pain  -Low back pain with radiation to the right leg    INTERVAL HISTORY:  Last seen on 3/18/2019.        Recommendations/plan at the last visit included:  1. 1.  Physical Therapy:  NO   2. Clinical Health Psychologist:  NO    3. Diagnostic Studies:  None at this time  4. Medication Management:    1. Continue Gabapentin 1200mg TID. Refilled today  2. Stop Flexeril and Robaxin. Try Tizanidine 2mg, 1-2 tabs TID PRN muscle spasms/pain  5. Further procedures recommended: TPI, patient would like to have these done with me. She is aware that I am not doing them yet. Will call her when I will be starting them.       Follow up with this provider:  4 Weeks      Since her last visit, Yusra Rivas reports:    Things have not been going as well. She has been having a lot of hip issues/right leg issues. She is even having trouble getting out of bed. She even has pain after taking a couple of steps. She saw orthopedics for her hip pain. She was recommended to start with PT. She starts this on 4/30/2019.    Ever since finishing chemo she has had red skin and feeling ill. She has been having issues with eating too. She is working with GI on this.     She has such a hard time knowing if the muscle relaxants are helpful. She doesn't know if the Percocet works better than Vicodin. She does states that about 1 hour after taking her Percocet she is not hurting as severe. She does state that if she takes 3/day, she is able to do more.      Pain Information:   Pain quality: Aching, Burning, Exhausting, Gnawing, Miserable, Nagging, Numb, Penetrating, Sharp, Shooting, Stabbing, Tender, Throbbing, Tiring and Unbearable    Pain rating: intensity ranges from 7/10 to 10/10, and averages 9/10 on a 0-10 scale.   Pain today 7/10      Annual Controlled Substance Agreement/UDS:  UDS: 5/18/2018  CSA: 2/18/2019    CURRENT RELEVANT PAIN MEDICATIONS:  Percocet 5/325 2-3/day  Gabapentin 900mg in AM,  600mg Afternoon, 900mg at bedtime   Tizanidine 2mg-1 in AM, 1 in afternoon (occasionally) 2 at night    Patient is using the medication as prescribed:  YES  Is your medication helpful? somewhat  Medication side effects? no side effect    Previous Medications: (H--helped; HI--Helped initially; SWH-- somewhat helpful, NH--No help; W--worse; SE--side effects)   Opiates: Morphine SE constipation, Percocet no more helpful than hydrocodone  NSAIDS: Indomethacin SE GI problems  Muscle Relaxants: Flexeril SE sedation, Robaxin SE sedation  Anti-migraine mediations: none  Anti-depressants: Amitriptyline SE gassy  Sleep aids: none  Anxiolytics: none  Neuropathics: Gabapentin H    Topicals: none  Other medications not covered above: Tylenol NH    Past Pain Treatments:  Pain Clinic:   Yes, she was at on in [place] after the Brain injury.   PT: Yes, was done last summer. No pool therapy  Psychologist: Yes, has seen [place]  Relaxation techniques/biofeedback: No  Chiropractor: No  Acupuncture: Yes, made pain worse  Pharmacotherapy:               Opioids: Yes                Non-opioids:    Yes   TENs Unit: No  Injections: Yes, - 11/5/18 lumbar TFESI right L5-S1  - 12/12/16 caudal epidural steroid injection  - 8/28/17 right L5-S1 facet and right SI joint injection  - 1/22/18 SCS trial  - 6/4/18 right hip injection (Dr Christensen)  - 7/25/18 right hip bursa injection, lumbar TPI  Self-care:   Yes, hot showers, heated blanket  Surgeries related to pain: Yes, fusion and jaskaran placement.    [place] Board of Pharmacy Data Base Reviewed:    YES; As expected, no concern for misuse/abuse of controlled medications based on this report.        THE 4 As OF OPIOID MAINTENANCE ANALGESIA    Analgesia: Is pain relief clinically significant? yes  Activity: Is patient functional and able to perform Activities of Daily Living? Yes  Adverse effects: Is patient free from adverse side effects from opiates? YES   Adherence to Rx protocol: Is patient adhering  "to Controlled Substance Agreement and taking medications ONLY as ordered? YES       Is Narcan prescribed for opiate use >50 MME daily? N/A      Daily MME: 15-22.5    Medications:  Current Outpatient Medications   Medication Sig Dispense Refill     Cyanocobalamin (VITAMIN B-12 PO) Take 1,000 mcg by mouth daily        docusate sodium (DOK) 100 MG capsule Take 1 capsule (100 mg) by mouth daily 100 capsule 0     gabapentin (NEURONTIN) 300 MG capsule Take 4 capsules (1,200 mg) by mouth 3 times daily 360 capsule 1     metoprolol tartrate (LOPRESSOR) 50 MG tablet TAKE ONE TABLET BY MOUTH TWICE A DAY 60 tablet 0     Multiple Vitamins-Minerals (I-GOLDIE) TABS TAKE ONE TABLET BY MOUTH EVERY DAY 60 tablet 8     oxyCODONE-acetaminophen (PERCOCET) 5-325 MG tablet Take 1 tablet by mouth every 6 hours as needed for pain 75 tablet 0     polyethylene glycol (MIRALAX/GLYCOLAX) powder STIR 17 GM OF POWDER (SEE DEXTER INSIDE CAP) IN 8-OZ OF LIQUID UNTIL COMPLETELY DISSOLVED. DRINK THE SOLUTION DAILY OR AS DIRECTED. 527 g 0     Pyridoxine HCl (VITAMIN B6 PO) Take 100 mg by mouth daily        ranitidine (ZANTAC) 150 MG tablet Take 150 mg by mouth 2 times daily       tiZANidine (ZANAFLEX) 2 MG tablet 1-2 tablet three times a day as needed for muscle pain 90 tablet 1     VITAMIN D3 1000 UNITS tablet TAKE TWO TABLETS BY MOUTH EVERY  tablet 3       Review of Systems: A 10-point review of systems was negative, with the exception of chronic pain issues.      Social History: Reviewed; unchanged from previous consultation.      Family history: Reviewed; unchanged from previous consultation.     PHYSICAL EXAM:     Vitals:  /82   Temp 99  F (37.2  C) (Temporal)   Ht 1.702 m (5' 7\")   Wt 85.5 kg (188 lb 6.4 oz)   BMI 29.51 kg/m        Constitutional: healthy, alert and no distress  HEENT: Head atraumatic, normocephalic. Eyes without conjunctival injection or jaundice. Neck supple. No obvious neck masses.  Skin: No rash, lesions, or " petechiae of exposed skin.   Psychiatric/mental status: Alert, without lethargy or stupor. Appropriate affect. Mood normal.   Neurologic exam: Walking with a cane today    DIAGNOSTIC TESTS:  Imaging Studies:   No new imaging to review today    Assessment:  Yusra Rivas is a 63 year old female who presents today for follow up regarding her:    1.  Chronic pain syndrome  2.  Chronic bilateral low back pain with radiation to bilateral legs  3.  Myofascial pain/dystonia  4.  Right hip and groin pain    Overall she feels better from her flare, but she still feels that she is not improving. She has seen orthopedics for her hip and plans to start PT. She feels that her hip is the cause of a lot of her pain right now. She would like to proceed with trigger points next week.       Plan:    Diagnosis reviewed, treatment option addressed, and risk/benifits discussed.  Self-care instructions given.  I am recommending a multidisciplinary treatment plan to help this patient better manage pain.        1. Physical Therapy:  Yes, start on 4/30 as scheduled   2. Clinical Health Psychologist:  NO    3. Diagnostic Studies:  None at this time  4. Medication Management:    1. Continue Gabapentin 900mg in AM, 600mg in afternoon, 900mg at HS.  2. Continue Tizanidine 2mg, 1-2 tabs TID PRN muscle spasms/pain  3.   Start Cymbalta 20mg BID. Side effects discussed.   5. Further procedures recommended: Lumbar TPI next week      Follow up with this provider:  4 Weeks     Total time spent face to face was 20 minutes and more than 50% of face to face time was spent in counseling and/or coordination of care regarding the diagnosis and recommendations above.      Denise Moss PA-C   [facility]

## 2019-04-18 ENCOUNTER — OFFICE VISIT (OUTPATIENT)
Dept: ORTHOPEDICS | Facility: CLINIC | Age: 64
End: 2019-04-18
Payer: MEDICARE

## 2019-04-18 ENCOUNTER — ANCILLARY PROCEDURE (OUTPATIENT)
Dept: GENERAL RADIOLOGY | Facility: CLINIC | Age: 64
End: 2019-04-18
Attending: ORTHOPAEDIC SURGERY
Payer: MEDICARE

## 2019-04-18 VITALS
DIASTOLIC BLOOD PRESSURE: 72 MMHG | HEART RATE: 73 BPM | SYSTOLIC BLOOD PRESSURE: 108 MMHG | BODY MASS INDEX: 29.51 KG/M2 | WEIGHT: 188.4 LBS

## 2019-04-18 DIAGNOSIS — M16.51 POST-TRAUMATIC OSTEOARTHRITIS OF RIGHT HIP: Primary | ICD-10-CM

## 2019-04-18 DIAGNOSIS — M25.551 RIGHT HIP PAIN: ICD-10-CM

## 2019-04-18 PROCEDURE — 73502 X-RAY EXAM HIP UNI 2-3 VIEWS: CPT | Mod: TC

## 2019-04-18 PROCEDURE — 99203 OFFICE O/P NEW LOW 30 MIN: CPT | Performed by: ORTHOPAEDIC SURGERY

## 2019-04-18 ASSESSMENT — PAIN SCALES - GENERAL: PAINLEVEL: EXTREME PAIN (8)

## 2019-04-18 ASSESSMENT — MIFFLIN-ST. JEOR: SCORE: 1442.21
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
## 2019-04-19 ENCOUNTER — OFFICE VISIT (OUTPATIENT)
Dept: PODIATRY | Facility: CLINIC | Age: 64
End: 2019-04-19
Payer: MEDICARE

## 2019-04-19 VITALS
SYSTOLIC BLOOD PRESSURE: 138 MMHG | DIASTOLIC BLOOD PRESSURE: 82 MMHG | TEMPERATURE: 99 F | HEIGHT: 67 IN | BODY MASS INDEX: 29.57 KG/M2 | WEIGHT: 188.4 LBS

## 2019-04-19 DIAGNOSIS — M79.18 MYOFASCIAL PAIN: ICD-10-CM

## 2019-04-19 DIAGNOSIS — G89.29 CHRONIC BILATERAL LOW BACK PAIN WITH BILATERAL SCIATICA: Primary | ICD-10-CM

## 2019-04-19 DIAGNOSIS — M54.42 CHRONIC BILATERAL LOW BACK PAIN WITH BILATERAL SCIATICA: Primary | ICD-10-CM

## 2019-04-19 DIAGNOSIS — G89.4 CHRONIC PAIN SYNDROME: ICD-10-CM

## 2019-04-19 DIAGNOSIS — M25.551 RIGHT HIP PAIN: ICD-10-CM

## 2019-04-19 DIAGNOSIS — M54.41 CHRONIC BILATERAL LOW BACK PAIN WITH BILATERAL SCIATICA: Primary | ICD-10-CM

## 2019-04-19 PROCEDURE — 99213 OFFICE O/P EST LOW 20 MIN: CPT | Performed by: PHYSICIAN ASSISTANT

## 2019-04-19 RX ORDER — DULOXETIN HYDROCHLORIDE 20 MG/1
20 CAPSULE, DELAYED RELEASE ORAL 2 TIMES DAILY
Qty: 60 CAPSULE | Refills: 0 | Status: SHIPPED | OUTPATIENT
Start: 2019-04-19 | End: 2019-05-20

## 2019-04-19 RX ORDER — OXYCODONE AND ACETAMINOPHEN 5; 325 MG/1; MG/1
1 TABLET ORAL EVERY 6 HOURS PRN
Qty: 90 TABLET | Refills: 0 | Status: SHIPPED | OUTPATIENT
Start: 2019-04-19 | End: 2019-05-20

## 2019-04-19 ASSESSMENT — PAIN SCALES - GENERAL: PAINLEVEL: SEVERE PAIN (7)

## 2019-04-19 ASSESSMENT — MIFFLIN-ST. JEOR: SCORE: 1442.21

## 2019-04-19 NOTE — PATIENT INSTRUCTIONS
After Visit Instructions:     Thank you for coming to [facility] for your care. It is my goal to partner with you to help you reach your optimal state of health.     I am recommending multidisciplinary care at this time.  The focus of care will be to continue gradual rehabilitation and pain management with medication adjustments as needed.    Continue daily self-care, identifying contributing factors, and monitoring variations in pain level. Continue to integrate self-care into your life.        1. Schedule physical therapy assessment/visit: keep as scheduled   2. Schedule follow-up with Denise Moss PA-C in 4 weeks. You will need to make this appointment.   3. Procedures recommended: trigger point injections next week with me   4. Medication recommendations:   1. Percocet 1 tablet 3 times a day. We went up to 90 tablets for this month, will go back to 75 next month  2. Cymbalta 20mg twice daily  3. Continue Gabapentin at current dosing  4. Continue Tizanidine as you have been      Denise Moss PA-C  [facility]    Contact information: [facility]  Clinic Number:  [phone]   Call this number with any questions about your care and for scheduling assistance. Calls are returned Monday through Friday between 8 AM and 4:30 PM. We usually get back to you within 2 business days depending on the issue/request.           Medication Refills Policy:    For non-narcotic medications, please your pharmacy directly to request a refill and the pharmacy will call the [facility] Management [facility] for authorization. Please allow 3-4 days for these refills.    For narcotic refills, call the nurse triage line and leave a message requesting your refill along with the name of the pharmacy that you use. Narcotic prescriptions will be mailed to your pharmacy or you may pick them up at the clinic.  Please call 7-10 days before your refill is due  The  above policy allows adequate time so that you do not run out of medication.    No Show - Late Cancellation - Late Arrival Policy  We believe regular attendance is key to your success in our program.    Any time you are unable to keep your appointment we ask that you call us at least 24 hours in advance to let us know. This will allow us to offer the appointment time to another patient. The following is our policy for missed appointments. This also applies to appointments cancelled with less than 24 hours notice.    After missing 3 appointments without calling first, we will cancel all of your future appointments at [facility].    At that point, you will not be able to resume services unless approved by your care team  We understand that unforseen circumstances arise, however, out of respect for all concerned and to provide this appointment to another patient, this policy will be enforced.    Please note that most follow up appointments are 30 minutes long. If you arrive late, your provider may not be able to see you for the entire 30 minutes. Please also note that if you arrive more than 15 minutes for any appointment, it may be rescheduled.

## 2019-04-19 NOTE — LETTER
4/19/2019         RE: Yusra Rivas  [address]  [place] [address]        Dear Colleague,    Thank you for referring your patient, Yusra Rivas, to the [facility]. Please see a copy of my visit note below.    [facility]    CHIEF COMPLAINT: Pain  -Low back pain with radiation to the right leg    INTERVAL HISTORY:  Last seen on 3/18/2019.        Recommendations/plan at the last visit included:  1. 1.  Physical Therapy:  NO   2. Clinical Health Psychologist:  NO    3. Diagnostic Studies:  None at this time  4. Medication Management:    1. Continue Gabapentin 1200mg TID. Refilled today  2. Stop Flexeril and Robaxin. Try Tizanidine 2mg, 1-2 tabs TID PRN muscle spasms/pain  5. Further procedures recommended: TPI, patient would like to have these done with me. She is aware that I am not doing them yet. Will call her when I will be starting them.       Follow up with this provider:  4 Weeks      Since her last visit, Yusra Rivas reports:    Things have not been going as well. She has been having a lot of hip issues/right leg issues. She is even having trouble getting out of bed. She even has pain after taking a couple of steps. She saw orthopedics for her hip pain. She was recommended to start with PT. She starts this on 4/30/2019.    Ever since finishing chemo she has had red skin and feeling ill. She has been having issues with eating too. She is working with GI on this.     She has such a hard time knowing if the muscle relaxants are helpful. She doesn't know if the Percocet works better than Vicodin. She does states that about 1 hour after taking her Percocet she is not hurting as severe. She does state that if she takes 3/day, she is able to do more.      Pain Information:   Pain quality: Aching, Burning, Exhausting, Gnawing, Miserable, Nagging, Numb, Penetrating, Sharp, Shooting, Stabbing, Tender, Throbbing, Tiring and Unbearable    Pain rating: intensity ranges  from 7/10 to 10/10, and averages 9/10 on a 0-10 scale.   Pain today 7/10      Annual Controlled Substance Agreement/UDS:  UDS: 5/18/2018  CSA: 2/18/2019    CURRENT RELEVANT PAIN MEDICATIONS:  Percocet 5/325 2-3/day  Gabapentin 900mg in AM, 600mg Afternoon, 900mg at bedtime   Tizanidine 2mg-1 in AM, 1 in afternoon (occasionally) 2 at night    Patient is using the medication as prescribed:  YES  Is your medication helpful? somewhat  Medication side effects? no side effect    Previous Medications: (H--helped; HI--Helped initially; SWH-- somewhat helpful, NH--No help; W--worse; SE--side effects)   Opiates: Morphine SE constipation, Percocet no more helpful than hydrocodone  NSAIDS: Indomethacin SE GI problems  Muscle Relaxants: Flexeril SE sedation, Robaxin SE sedation  Anti-migraine mediations: none  Anti-depressants: Amitriptyline SE gassy  Sleep aids: none  Anxiolytics: none  Neuropathics: Gabapentin H    Topicals: none  Other medications not covered above: Tylenol NH    Past Pain Treatments:  Pain Clinic:   Yes, she was at on in [place] after the Brain injury.   PT: Yes, was done last summer. No pool therapy  Psychologist: Yes, has seen [place]  Relaxation techniques/biofeedback: No  Chiropractor: No  Acupuncture: Yes, made pain worse  Pharmacotherapy:               Opioids: Yes                Non-opioids:    Yes   TENs Unit: No  Injections: Yes, - 11/5/18 lumbar TFESI right L5-S1  - 12/12/16 caudal epidural steroid injection  - 8/28/17 right L5-S1 facet and right SI joint injection  - 1/22/18 SCS trial  - 6/4/18 right hip injection (Dr Christensen)  - 7/25/18 right hip bursa injection, lumbar TPI  Self-care:   Yes, hot showers, heated blanket  Surgeries related to pain: Yes, fusion and jaskaran placement.    [place] Board of Pharmacy Data Base Reviewed:    YES; As expected, no concern for misuse/abuse of controlled medications based on this report.        THE 4 As OF OPIOID MAINTENANCE ANALGESIA    Analgesia: Is pain relief  "clinically significant? yes  Activity: Is patient functional and able to perform Activities of Daily Living? Yes  Adverse effects: Is patient free from adverse side effects from opiates? YES   Adherence to Rx protocol: Is patient adhering to Controlled Substance Agreement and taking medications ONLY as ordered? YES       Is Narcan prescribed for opiate use >50 MME daily? N/A      Daily MME: 15-22.5    Medications:  Current Outpatient Medications   Medication Sig Dispense Refill     Cyanocobalamin (VITAMIN B-12 PO) Take 1,000 mcg by mouth daily        docusate sodium (DOK) 100 MG capsule Take 1 capsule (100 mg) by mouth daily 100 capsule 0     gabapentin (NEURONTIN) 300 MG capsule Take 4 capsules (1,200 mg) by mouth 3 times daily 360 capsule 1     metoprolol tartrate (LOPRESSOR) 50 MG tablet TAKE ONE TABLET BY MOUTH TWICE A DAY 60 tablet 0     Multiple Vitamins-Minerals (I-GOLDIE) TABS TAKE ONE TABLET BY MOUTH EVERY DAY 60 tablet 8     oxyCODONE-acetaminophen (PERCOCET) 5-325 MG tablet Take 1 tablet by mouth every 6 hours as needed for pain 75 tablet 0     polyethylene glycol (MIRALAX/GLYCOLAX) powder STIR 17 GM OF POWDER (SEE DEXTER INSIDE CAP) IN 8-OZ OF LIQUID UNTIL COMPLETELY DISSOLVED. DRINK THE SOLUTION DAILY OR AS DIRECTED. 527 g 0     Pyridoxine HCl (VITAMIN B6 PO) Take 100 mg by mouth daily        ranitidine (ZANTAC) 150 MG tablet Take 150 mg by mouth 2 times daily       tiZANidine (ZANAFLEX) 2 MG tablet 1-2 tablet three times a day as needed for muscle pain 90 tablet 1     VITAMIN D3 1000 UNITS tablet TAKE TWO TABLETS BY MOUTH EVERY  tablet 3       Review of Systems: A 10-point review of systems was negative, with the exception of chronic pain issues.      Social History: Reviewed; unchanged from previous consultation.      Family history: Reviewed; unchanged from previous consultation.     PHYSICAL EXAM:     Vitals:  /82   Temp 99  F (37.2  C) (Temporal)   Ht 1.702 m (5' 7\")   Wt 85.5 kg (188 " lb 6.4 oz)   BMI 29.51 kg/m         Constitutional: healthy, alert and no distress  HEENT: Head atraumatic, normocephalic. Eyes without conjunctival injection or jaundice. Neck supple. No obvious neck masses.  Skin: No rash, lesions, or petechiae of exposed skin.   Psychiatric/mental status: Alert, without lethargy or stupor. Appropriate affect. Mood normal.   Neurologic exam: Walking with a cane today    DIAGNOSTIC TESTS:  Imaging Studies:   No new imaging to review today    Assessment:  Yusra Rivas is a 63 year old female who presents today for follow up regarding her:    1.  Chronic pain syndrome  2.  Chronic bilateral low back pain with radiation to bilateral legs  3.  Myofascial pain/dystonia  4.  Right hip and groin pain    Overall she feels better from her flare, but she still feels that she is not improving. She has seen orthopedics for her hip and plans to start PT. She feels that her hip is the cause of a lot of her pain right now. She would like to proceed with trigger points next week.       Plan:    Diagnosis reviewed, treatment option addressed, and risk/benifits discussed.  Self-care instructions given.  I am recommending a multidisciplinary treatment plan to help this patient better manage pain.        1. Physical Therapy:  Yes, start on 4/30 as scheduled   2. Clinical Health Psychologist:  NO    3. Diagnostic Studies:  None at this time  4. Medication Management:    1. Continue Gabapentin 900mg in AM, 600mg in afternoon, 900mg at HS.  2. Continue Tizanidine 2mg, 1-2 tabs TID PRN muscle spasms/pain  3.   Start Cymbalta 20mg BID. Side effects discussed.   5. Further procedures recommended: Lumbar TPI next week      Follow up with this provider:  4 Weeks     Total time spent face to face was 20 minutes and more than 50% of face to face time was spent in counseling and/or coordination of care regarding the diagnosis and recommendations above.      Denise Moss PA-C   [facility]        Again, thank you for allowing me to participate in the care of your patient.        Sincerely,        Denise Moss PA-C

## 2019-04-24 ENCOUNTER — TELEPHONE (OUTPATIENT)
Dept: FAMILY MEDICINE | Facility: OTHER | Age: 64
End: 2019-04-24

## 2019-04-24 NOTE — TELEPHONE ENCOUNTER
Reason for Call:  Other call back    Detailed comments: Yusra called stating she is having headaches since she started the new medication prescribed at the last visit.  Please call to advise    Phone Number Patient can be reached at: Home number on file [phone] (home)    Best Time: any    Can we leave a detailed message on this number? YES    Call taken on 4/24/2019 at 2:41 PM by Funmi Darnell

## 2019-04-24 NOTE — TELEPHONE ENCOUNTER
"Phone call with patient who states she has been having headaches since two days after starting Cymbalta on 4/19. Pt reports headache is in her eyes and top of head and seem to worsen as the day goes on. She also states her blood pressure has been fluctuating between 120/80s to 154/82. Pt denies dizziness or any other symptoms but does state she feels \"miserable and fidgety.\" She states she is on a beta blocker for unsustained v-tach spells. Routing to pain nurse pool to contact patient.    Felicia Krishnan RN on 4/24/2019 at 3:45 PM    "

## 2019-04-25 ENCOUNTER — HOSPITAL ENCOUNTER (OUTPATIENT)
Dept: PHYSICAL THERAPY | Facility: OTHER | Age: 64
Setting detail: THERAPIES SERIES
End: 2019-04-25
Attending: ORTHOPAEDIC SURGERY
Payer: MEDICARE

## 2019-04-25 DIAGNOSIS — M16.51 POST-TRAUMATIC OSTEOARTHRITIS OF RIGHT HIP: ICD-10-CM

## 2019-04-25 PROCEDURE — 97161 PT EVAL LOW COMPLEX 20 MIN: CPT | Mod: GP | Performed by: PHYSICAL THERAPIST

## 2019-04-25 PROCEDURE — 97110 THERAPEUTIC EXERCISES: CPT | Mod: GP | Performed by: PHYSICAL THERAPIST

## 2019-04-25 NOTE — TELEPHONE ENCOUNTER
Please call patient and let her know that she can reduce the Cymbalta to once daily, I would recommend she take it at bedtime. If she would prefer to stop the medication, she can do that as well. Usually these side effects get better as the body adjusts to the medication, but if the headaches are quite bothersome for her, she can stop the Cymbalta.     She should contact her primary care provider regarding her blood pressure fluctuations.     Denise Moss PA-C on 4/25/2019 at 9:11 AM

## 2019-04-25 NOTE — PROGRESS NOTES
[facility]          OUTPATIENT PHYSICAL THERAPY ORTHOPEDIC EVALUATION  PLAN OF TREATMENT FOR OUTPATIENT REHABILITATION  (COMPLETE FOR INITIAL CLAIMS ONLY)  Patient's Last Name, First Name, M.I.  YOB: 1955  Yusra Rivas    Provider s Name:  [facility]   Medical Record No.  [id number]   Start of Care Date:  04/25/19   Onset Date:  (4/25/2017)   Type:     _X__PT   ___OT   ___SLP Medical Diagnosis:  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     PT Diagnosis:  right hip pain   Visits from SOC:  1      _________________________________________________________________________________  Plan of Treatment/Functional Goals:              Goals  Goal Identifier: 1  Goal Description: instruction in HEP and compliant with it 5 of 7 days   Target Date: 07/25/19    Goal Identifier: 2  Goal Description: patient to be able to walk standing straight   Target Date: 07/25/19    Goal Identifier: 3  Goal Description: patient to have reduction in pain level currently 5-8/10 goal is 5-6/10  Target Date: 07/25/19                                                           Therapy Frequency:     Predicted Duration of Therapy Intervention:  every other week x 3 months     Yusra Rolle, PT                 I CERTIFY THE NEED FOR THESE SERVICES FURNISHED UNDER        THIS PLAN OF TREATMENT AND WHILE UNDER MY CARE     (Physician co-signature of this document indicates review and certification of the therapy plan).                       Certification Date From:  04/25/19   Certification Date To:  07/25/19    Referring Provider:  mara yadav DO     Initial Assessment        See Epic Evaluation Start of Care Date: 04/25/19

## 2019-04-25 NOTE — TELEPHONE ENCOUNTER
Symptoms started 2 days after starting Cymbalta, but today there are none.    Reviewed options below with patient. Today she feels free of side effects and her pain has improved since starting the medication so she will stay on the current dose. If side effects continue to be a concern she will decrease her dose to 20 MG at bedtime and notify us.     I asked that she follow up with PCP concerning fluctuations in BP.     Provided a reminder of her appt. with Denise tomorrow for TPI.    MYLENE MccarthyN, RN  Care Coordinator  [facility]
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
## 2019-04-26 ENCOUNTER — OFFICE VISIT (OUTPATIENT)
Dept: PODIATRY | Facility: CLINIC | Age: 64
End: 2019-04-26
Payer: MEDICARE

## 2019-04-26 VITALS
SYSTOLIC BLOOD PRESSURE: 114 MMHG | HEIGHT: 67 IN | DIASTOLIC BLOOD PRESSURE: 60 MMHG | BODY MASS INDEX: 29.57 KG/M2 | TEMPERATURE: 98.8 F | WEIGHT: 188.4 LBS

## 2019-04-26 DIAGNOSIS — M79.18 MYOFASCIAL PAIN: Primary | ICD-10-CM

## 2019-04-26 PROCEDURE — 20552 NJX 1/MLT TRIGGER POINT 1/2: CPT | Performed by: PSYCHIATRY & NEUROLOGY

## 2019-04-26 ASSESSMENT — PAIN SCALES - GENERAL: PAINLEVEL: MODERATE PAIN (5)

## 2019-04-26 ASSESSMENT — MIFFLIN-ST. JEOR: SCORE: 1442.21

## 2019-04-26 NOTE — PROGRESS NOTES
Pre procedure Diagnosis: myofascial pain   Post procedure Diagnosis: Same  Procedure performed: trigger point injections  Anesthesia: none  Complications: none  Operators: Denise Moss PA-C and Shamika Christensen MD    Indications:   Yusra Rivas is a 63 year old female with a history of chronic low back pain and myofascial pain.  Exam shows myofascial pain of the muscle groups listed below and they have tried conservative treatment including medications and physical therapy .    Options/alternatives, benefits and risks were discussed with the patient including bleeding, infection, tissue trauma and pnuemothorax.  Questions were answered to her satisfaction and she agrees to proceed. Voluntary informed consent was obtained and signed.     Vitals were reviewed: Yes  Allergies were reviewed:  Yes   Medications were reviewed:  Yes   Pre-procedure pain score: 5/10    Procedure:  After getting informed consent, a Pause for the Cause was performed.    Trigger points were identified by patient, and marked when appropriate.  The area was prepped with Chloroprep.    Using clean technique, injections were completed using a 25G, 1.5 inch needle.  After negative aspiration, injection was completed.  A total of 10 locations were injected.  When possible, tissue was retracted from the chest wall to avoid lung injury.    Muscle groups injected:  Lumbar paravertebral muscles bilateral    Injection solution contained:  8 ml of 1% lidocaine and 8 ml of 0.5% bupivacaine.    Hemostasis was achieved, the area was cleaned, and bandaids were placed when appropriate.  The patient tolerated the procedure well.  Breath sounds were normal.      Post-procedure pain score: 0/10  Follow-up includes:   -repeat prn  -follow up with me as scheduled on 5/20/2019    COLETTE Menendez   [facility]    I was present for the procedure in its entirety.    Shamika Christensen MD  [facility]

## 2019-04-26 NOTE — PATIENT INSTRUCTIONS
[facility]   Post Procedure Instructions    Today you had:  trigger point injections        Medications used:  lidocaine   bupivicaine           Go to the emergency room if you develop any shortness of breath    Monitor the injection sites for signs and symptoms of infection-fever, chills, redness, swelling, warmth, or drainage to areas.    You may have soreness at injection sites for up to 24 hours.    You may apply ice to the painful areas to help minimize the discomfort of the needle pokes.    Do not apply heat to sites for at least 12 hours.    You may use anti-inflammatory medications or Tylenol for pain control if necessary    Pain Clinic phone number during work hours Monday-Friday:  [phone]    After hours provider line: [phone]

## 2019-05-07 ENCOUNTER — HOSPITAL ENCOUNTER (OUTPATIENT)
Dept: PHYSICAL THERAPY | Facility: OTHER | Age: 64
Setting detail: THERAPIES SERIES
End: 2019-05-07
Attending: ORTHOPAEDIC SURGERY
Payer: MEDICARE

## 2019-05-07 PROCEDURE — 97110 THERAPEUTIC EXERCISES: CPT | Mod: GP | Performed by: PHYSICAL THERAPIST

## 2019-05-20 ENCOUNTER — OFFICE VISIT (OUTPATIENT)
Dept: PODIATRY | Facility: CLINIC | Age: 64
End: 2019-05-20
Payer: MEDICARE

## 2019-05-20 VITALS
DIASTOLIC BLOOD PRESSURE: 58 MMHG | WEIGHT: 182.5 LBS | HEIGHT: 67 IN | BODY MASS INDEX: 28.64 KG/M2 | SYSTOLIC BLOOD PRESSURE: 106 MMHG | TEMPERATURE: 98 F

## 2019-05-20 DIAGNOSIS — G89.4 CHRONIC PAIN SYNDROME: ICD-10-CM

## 2019-05-20 DIAGNOSIS — M54.42 CHRONIC BILATERAL LOW BACK PAIN WITH BILATERAL SCIATICA: Primary | ICD-10-CM

## 2019-05-20 DIAGNOSIS — M25.551 CHRONIC RIGHT HIP PAIN: ICD-10-CM

## 2019-05-20 DIAGNOSIS — G89.29 CHRONIC BILATERAL LOW BACK PAIN WITH BILATERAL SCIATICA: Primary | ICD-10-CM

## 2019-05-20 DIAGNOSIS — G89.29 CHRONIC RIGHT HIP PAIN: ICD-10-CM

## 2019-05-20 DIAGNOSIS — M79.18 MYOFASCIAL PAIN: ICD-10-CM

## 2019-05-20 DIAGNOSIS — M54.41 CHRONIC BILATERAL LOW BACK PAIN WITH BILATERAL SCIATICA: Primary | ICD-10-CM

## 2019-05-20 PROCEDURE — 99213 OFFICE O/P EST LOW 20 MIN: CPT | Performed by: PHYSICIAN ASSISTANT

## 2019-05-20 RX ORDER — OXYCODONE AND ACETAMINOPHEN 5; 325 MG/1; MG/1
1 TABLET ORAL EVERY 6 HOURS PRN
Qty: 90 TABLET | Refills: 0 | Status: SHIPPED | OUTPATIENT
Start: 2019-05-20 | End: 2019-06-12

## 2019-05-20 RX ORDER — DULOXETIN HYDROCHLORIDE 20 MG/1
20 CAPSULE, DELAYED RELEASE ORAL 2 TIMES DAILY
Qty: 60 CAPSULE | Refills: 1 | Status: SHIPPED | OUTPATIENT
Start: 2019-05-20 | End: 2019-07-16

## 2019-05-20 ASSESSMENT — PAIN SCALES - GENERAL: PAINLEVEL: MODERATE PAIN (4)

## 2019-05-20 ASSESSMENT — MIFFLIN-ST. JEOR: SCORE: 1415.44

## 2019-05-20 NOTE — PATIENT INSTRUCTIONS
After Visit Instructions:     Thank you for coming to [facility] for your care. It is my goal to partner with you to help you reach your optimal state of health.     I am recommending multidisciplinary care at this time.  The focus of care will be to continue gradual rehabilitation and pain management with medication adjustments as needed.    Continue daily self-care, identifying contributing factors, and monitoring variations in pain level. Continue to integrate self-care into your life.      1. Schedule physical therapy assessment/visit: Continue current plan  2. Schedule follow-up with Denise Moss PA-C in 8 weeks. You will need to make this appointment.   3. Procedures recommended: trigger point injections as needed   4. Medication recommendations:   1. Continue Gabapentin 900mg in AM, 600mg in afternoon and 900mg at bedtime  2. Continue Cymbalta 20mg twice daily  3. Continue Percocet 2-3/day  4. Tizanidine 2mg, 1-2 tabs three times a day as needed for muscle pain/spasms      Denise Moss PA-C  [facility]    Contact information: [facility]  Clinic Number:  [phone]   Call this number with any questions about your care and for scheduling assistance. Calls are returned Monday through Friday between 8 AM and 4:30 PM. We usually get back to you within 2 business days depending on the issue/request.           Medication Refills Policy:    For non-narcotic medications, please your pharmacy directly to request a refill and the pharmacy will call the Pain Management Center for authorization. Please allow 3-4 days for these refills.    For narcotic refills, call the nurse triage line and leave a message requesting your refill along with the name of the pharmacy that you use. Narcotic prescriptions will be mailed to your pharmacy or you may pick them up at the clinic.  Please call 7-10 days before your refill is due  The  above policy allows adequate time so that you do not run out of medication.    No Show - Late Cancellation - Late Arrival Policy  We believe regular attendance is key to your success in our program.    Any time you are unable to keep your appointment we ask that you call us at least 24 hours in advance to let us know. This will allow us to offer the appointment time to another patient. The following is our policy for missed appointments. This also applies to appointments cancelled with less than 24 hours notice.    After missing 3 appointments without calling first, we will cancel all of your future appointments at [facility].    At that point, you will not be able to resume services unless approved by your care team  We understand that unforseen circumstances arise, however, out of respect for all concerned and to provide this appointment to another patient, this policy will be enforced.    Please note that most follow up appointments are 30 minutes long. If you arrive late, your provider may not be able to see you for the entire 30 minutes. Please also note that if you arrive more than 15 minutes for any appointment, it may be rescheduled.

## 2019-05-20 NOTE — LETTER
5/20/2019         RE: Yusra Rivas  [address]  [place] [address]        Dear Colleague,    Thank you for referring your patient, Yusra Rivas, to the [facility]. Please see a copy of my visit note below.    [facility]    CHIEF COMPLAINT: Pain  -Low back pain with radiation to the right leg    INTERVAL HISTORY:  Last seen on 3/18/2019.        Recommendations/plan at the last visit included:  1. Physical Therapy:  Yes, start on 4/30 as scheduled   2. Clinical Health Psychologist:  NO    3. Diagnostic Studies:  None at this time  4. Medication Management:    1. Continue Gabapentin 900mg in AM, 600mg in afternoon, 900mg at HS.  2. Continue Tizanidine 2mg, 1-2 tabs TID PRN muscle spasms/pain  3.   Start Cymbalta 20mg BID. Side effects discussed.   5. Further procedures recommended: Lumbar TPI next week      Follow up with this provider:  4 Weeks     Since her last visit, Yusra Rivas reports:  Her mood and activity level are improved.     She had lumbar TPIs on 4/26/2019. They continue to be helpful. She states that she is doing much better after getting them.     She was diagnosed with esophageal GERD. She will start with Zantac for this.       Pain Information:   Pain quality: Aching, numb, and penetrating    Pain rating: intensity ranges from 2/10 to 6/10, and averages 4/10 on a 0-10 scale.   Pain today 4/10      Annual Controlled Substance Agreement/UDS:  UDS: 5/18/2018  CSA: 2/18/2019    CURRENT RELEVANT PAIN MEDICATIONS:  Percocet 5/325 2-3/day  Gabapentin 900mg in AM, 600mg Afternoon, 900mg at bedtime   Tizanidine 2mg-1 in AM, 1 in afternoon (occasionally) 2 at night  Cymbalta 20mg BID    Patient is using the medication as prescribed:  YES  Is your medication helpful? somewhat  Medication side effects? no side effect    Previous Medications: (H--helped; HI--Helped initially; SWH-- somewhat helpful, NH--No help; W--worse; SE--side effects)   Opiates: Morphine  SE constipation, Percocet no more helpful than hydrocodone  NSAIDS: Indomethacin SE GI problems  Muscle Relaxants: Flexeril SE sedation, Robaxin SE sedation  Anti-migraine mediations: none  Anti-depressants: Amitriptyline SE gassy  Sleep aids: none  Anxiolytics: none  Neuropathics: Gabapentin H    Topicals: none  Other medications not covered above: Tylenol NH    Past Pain Treatments:  Pain Clinic:   Yes, she was at on in [place] after the Brain injury.   PT: Yes, was done last summer. No pool therapy  Psychologist: Yes, has seen [place]  Relaxation techniques/biofeedback: No  Chiropractor: No  Acupuncture: Yes, made pain worse  Pharmacotherapy:               Opioids: Yes                Non-opioids:    Yes   TENs Unit: No  Injections: Yes, - 11/5/18 lumbar TFESI right L5-S1  - 12/12/16 caudal epidural steroid injection  - 8/28/17 right L5-S1 facet and right SI joint injection  - 1/22/18 SCS trial  - 6/4/18 right hip injection (Dr Christensen)  - 7/25/18 right hip bursa injection, lumbar TPI  Self-care:   Yes, hot showers, heated blanket  Surgeries related to pain: Yes, fusion and jaskaran placement.    [place] Board of Pharmacy Data Base Reviewed:    YES; As expected, no concern for misuse/abuse of controlled medications based on this report.        THE 4 As OF OPIOID MAINTENANCE ANALGESIA    Analgesia: Is pain relief clinically significant? yes  Activity: Is patient functional and able to perform Activities of Daily Living? Yes  Adverse effects: Is patient free from adverse side effects from opiates? YES   Adherence to Rx protocol: Is patient adhering to Controlled Substance Agreement and taking medications ONLY as ordered? YES       Is Narcan prescribed for opiate use >50 MME daily? N/A      Daily MME: 15-22.5    Medications:  Current Outpatient Medications   Medication Sig Dispense Refill     Cyanocobalamin (VITAMIN B-12 PO) Take 1,000 mcg by mouth daily        docusate sodium (DOK) 100 MG capsule Take 1 capsule (100 mg)  "by mouth daily 100 capsule 0     DULoxetine (CYMBALTA) 20 MG capsule Take 1 capsule (20 mg) by mouth 2 times daily 60 capsule 0     gabapentin (NEURONTIN) 300 MG capsule Take 4 capsules (1,200 mg) by mouth 3 times daily 360 capsule 1     metoprolol tartrate (LOPRESSOR) 50 MG tablet TAKE ONE TABLET BY MOUTH TWICE A DAY 60 tablet 0     Multiple Vitamins-Minerals (I-GOLDIE) TABS TAKE ONE TABLET BY MOUTH EVERY DAY 60 tablet 8     oxyCODONE-acetaminophen (PERCOCET) 5-325 MG tablet Take 1 tablet by mouth every 6 hours as needed for pain 90 tablet 0     polyethylene glycol (MIRALAX/GLYCOLAX) powder STIR 17 GM OF POWDER (SEE DEXTER INSIDE CAP) IN 8-OZ OF LIQUID UNTIL COMPLETELY DISSOLVED. DRINK THE SOLUTION DAILY OR AS DIRECTED. 527 g 0     Pyridoxine HCl (VITAMIN B6 PO) Take 100 mg by mouth daily        ranitidine (ZANTAC) 150 MG tablet Take 150 mg by mouth 2 times daily       tiZANidine (ZANAFLEX) 2 MG tablet 1-2 tablet three times a day as needed for muscle pain 90 tablet 1     VITAMIN D3 1000 UNITS tablet TAKE TWO TABLETS BY MOUTH EVERY  tablet 3       Review of Systems: A 10-point review of systems was negative, with the exception of chronic pain issues.      Social History: Reviewed; unchanged from previous consultation.      Family history: Reviewed; unchanged from previous consultation.     PHYSICAL EXAM:     Vitals:  /58   Temp 98  F (36.7  C) (Temporal)   Ht 1.702 m (5' 7\")   Wt 82.8 kg (182 lb 8 oz)   BMI 28.58 kg/m     Body mass index is 28.58 kg/m .  5' 7\"  182 lbs 8 oz      Constitutional: healthy, alert and no distress  HEENT: Head atraumatic, normocephalic. Eyes without conjunctival injection or jaundice. Neck supple. No obvious neck masses.  Skin: No rash, lesions, or petechiae of exposed skin.   Psychiatric/mental status: Alert, without lethargy or stupor. Appropriate affect. Mood normal.   Neurologic exam: Walking with a cane today    DIAGNOSTIC TESTS:  Imaging Studies:   No new imaging to " review today    Assessment:  Yusra Rivas is a 63 year old female who presents today for follow up regarding her:    1.  Chronic pain syndrome  2.  Chronic bilateral low back pain with radiation to bilateral legs  3.  Myofascial pain/dystonia  4.  Right hip and groin pain    Pain continues to be improved.  She reports significant benefit from the trigger point injections.  Would like to continue on the current plan.  I think that this is more than appropriate.  No changes made today.      Plan:    Diagnosis reviewed, treatment option addressed, and risk/benifits discussed.  Self-care instructions given.  I am recommending a multidisciplinary treatment plan to help this patient better manage pain.        1. Physical Therapy:  Yes, continue current plan  2. Clinical Health Psychologist:  NO    3. Diagnostic Studies:  None at this time  4. Medication Management:    1. Continue Gabapentin 900mg in AM, 600mg in afternoon, 900mg at HS.  2. Continue Tizanidine 2mg, 1-2 tabs TID PRN muscle spasms/pain  3. Continue Cymbalta 20mg BID.   4. Continue Percocet 2 to 3/day with a max of 3/day.  5. Further procedures recommended: Lumbar TPI as needed      Follow up with this provider: 8 Weeks     Total time spent face to face was 15 minutes and more than 50% of face to face time was spent in counseling and/or coordination of care regarding the diagnosis and recommendations above.      Denise Moss PA-C   [facility]      Again, thank you for allowing me to participate in the care of your patient.        Sincerely,        Denise Moss PA-C

## 2019-05-20 NOTE — PROGRESS NOTES
[facility]    CHIEF COMPLAINT: Pain  -Low back pain with radiation to the right leg    INTERVAL HISTORY:  Last seen on 3/18/2019.        Recommendations/plan at the last visit included:  1. Physical Therapy:  Yes, start on 4/30 as scheduled   2. Clinical Health Psychologist:  NO    3. Diagnostic Studies:  None at this time  4. Medication Management:    1. Continue Gabapentin 900mg in AM, 600mg in afternoon, 900mg at HS.  2. Continue Tizanidine 2mg, 1-2 tabs TID PRN muscle spasms/pain  3.   Start Cymbalta 20mg BID. Side effects discussed.   5. Further procedures recommended: Lumbar TPI next week      Follow up with this provider:  4 Weeks     Since her last visit, Yusra TONE Rivas reports:  Her mood and activity level are improved.     She had lumbar TPIs on 4/26/2019. They continue to be helpful. She states that she is doing much better after getting them.     She was diagnosed with esophageal GERD. She will start with Zantac for this.       Pain Information:   Pain quality: Aching, numb, and penetrating    Pain rating: intensity ranges from 2/10 to 6/10, and averages 4/10 on a 0-10 scale.   Pain today 4/10      Annual Controlled Substance Agreement/UDS:  UDS: 5/18/2018  CSA: 2/18/2019    CURRENT RELEVANT PAIN MEDICATIONS:  Percocet 5/325 2-3/day  Gabapentin 900mg in AM, 600mg Afternoon, 900mg at bedtime   Tizanidine 2mg-1 in AM, 1 in afternoon (occasionally) 2 at night  Cymbalta 20mg BID    Patient is using the medication as prescribed:  YES  Is your medication helpful? somewhat  Medication side effects? no side effect    Previous Medications: (H--helped; HI--Helped initially; SWH-- somewhat helpful, NH--No help; W--worse; SE--side effects)   Opiates: Morphine SE constipation, Percocet no more helpful than hydrocodone  NSAIDS: Indomethacin SE GI problems  Muscle Relaxants: Flexeril SE sedation, Robaxin SE sedation  Anti-migraine mediations: none  Anti-depressants: Amitriptyline SE gassy  Sleep  aids: none  Anxiolytics: none  Neuropathics: Gabapentin H    Topicals: none  Other medications not covered above: Tylenol NH    Past Pain Treatments:  Pain Clinic:   Yes, she was at on in [place] after the Brain injury.   PT: Yes, was done last summer. No pool therapy  Psychologist: Yes, has seen Santo  Relaxation techniques/biofeedback: No  Chiropractor: No  Acupuncture: Yes, made pain worse  Pharmacotherapy:               Opioids: Yes                Non-opioids:    Yes   TENs Unit: No  Injections: Yes, - 11/5/18 lumbar TFESI right L5-S1  - 12/12/16 caudal epidural steroid injection  - 8/28/17 right L5-S1 facet and right SI joint injection  - 1/22/18 SCS trial  - 6/4/18 right hip injection (Dr Christensen)  - 7/25/18 right hip bursa injection, lumbar TPI  Self-care:   Yes, hot showers, heated blanket  Surgeries related to pain: Yes, fusion and jaskaran placement.    [place] Board of Pharmacy Data Base Reviewed:    YES; As expected, no concern for misuse/abuse of controlled medications based on this report.        THE 4 As OF OPIOID MAINTENANCE ANALGESIA    Analgesia: Is pain relief clinically significant? yes  Activity: Is patient functional and able to perform Activities of Daily Living? Yes  Adverse effects: Is patient free from adverse side effects from opiates? YES   Adherence to Rx protocol: Is patient adhering to Controlled Substance Agreement and taking medications ONLY as ordered? YES       Is Narcan prescribed for opiate use >50 MME daily? N/A      Daily MME: 15-22.5    Medications:  Current Outpatient Medications   Medication Sig Dispense Refill     Cyanocobalamin (VITAMIN B-12 PO) Take 1,000 mcg by mouth daily        docusate sodium (DOK) 100 MG capsule Take 1 capsule (100 mg) by mouth daily 100 capsule 0     DULoxetine (CYMBALTA) 20 MG capsule Take 1 capsule (20 mg) by mouth 2 times daily 60 capsule 0     gabapentin (NEURONTIN) 300 MG capsule Take 4 capsules (1,200 mg) by mouth 3 times daily 360 capsule 1      "metoprolol tartrate (LOPRESSOR) 50 MG tablet TAKE ONE TABLET BY MOUTH TWICE A DAY 60 tablet 0     Multiple Vitamins-Minerals (I-GOLDIE) TABS TAKE ONE TABLET BY MOUTH EVERY DAY 60 tablet 8     oxyCODONE-acetaminophen (PERCOCET) 5-325 MG tablet Take 1 tablet by mouth every 6 hours as needed for pain 90 tablet 0     polyethylene glycol (MIRALAX/GLYCOLAX) powder STIR 17 GM OF POWDER (SEE DEXTER INSIDE CAP) IN 8-OZ OF LIQUID UNTIL COMPLETELY DISSOLVED. DRINK THE SOLUTION DAILY OR AS DIRECTED. 527 g 0     Pyridoxine HCl (VITAMIN B6 PO) Take 100 mg by mouth daily        ranitidine (ZANTAC) 150 MG tablet Take 150 mg by mouth 2 times daily       tiZANidine (ZANAFLEX) 2 MG tablet 1-2 tablet three times a day as needed for muscle pain 90 tablet 1     VITAMIN D3 1000 UNITS tablet TAKE TWO TABLETS BY MOUTH EVERY  tablet 3       Review of Systems: A 10-point review of systems was negative, with the exception of chronic pain issues.      Social History: Reviewed; unchanged from previous consultation.      Family history: Reviewed; unchanged from previous consultation.     PHYSICAL EXAM:     Vitals:  /58   Temp 98  F (36.7  C) (Temporal)   Ht 1.702 m (5' 7\")   Wt 82.8 kg (182 lb 8 oz)   BMI 28.58 kg/m    Body mass index is 28.58 kg/m .  5' 7\"  182 lbs 8 oz      Constitutional: healthy, alert and no distress  HEENT: Head atraumatic, normocephalic. Eyes without conjunctival injection or jaundice. Neck supple. No obvious neck masses.  Skin: No rash, lesions, or petechiae of exposed skin.   Psychiatric/mental status: Alert, without lethargy or stupor. Appropriate affect. Mood normal.   Neurologic exam: Walking with a cane today    DIAGNOSTIC TESTS:  Imaging Studies:   No new imaging to review today    Assessment:  Yusra Rivas is a 63 year old female who presents today for follow up regarding her:    1.  Chronic pain syndrome  2.  Chronic bilateral low back pain with radiation to bilateral legs  3.  Myofascial " pain/dystonia  4.  Right hip and groin pain    Pain continues to be improved.  She reports significant benefit from the trigger point injections.  Would like to continue on the current plan.  I think that this is more than appropriate.  No changes made today.      Plan:    Diagnosis reviewed, treatment option addressed, and risk/benifits discussed.  Self-care instructions given.  I am recommending a multidisciplinary treatment plan to help this patient better manage pain.        1. Physical Therapy:  Yes, continue current plan  2. Clinical Health Psychologist:  NO    3. Diagnostic Studies:  None at this time  4. Medication Management:    1. Continue Gabapentin 900mg in AM, 600mg in afternoon, 900mg at HS.  2. Continue Tizanidine 2mg, 1-2 tabs TID PRN muscle spasms/pain  3. Continue Cymbalta 20mg BID.   4. Continue Percocet 2 to 3/day with a max of 3/day.  5. Further procedures recommended: Lumbar TPI as needed      Follow up with this provider: 8 Weeks     Total time spent face to face was 15 minutes and more than 50% of face to face time was spent in counseling and/or coordination of care regarding the diagnosis and recommendations above.      Denise Moss PA-C   [facility]

## 2019-05-21 ENCOUNTER — HOSPITAL ENCOUNTER (OUTPATIENT)
Dept: PHYSICAL THERAPY | Facility: OTHER | Age: 64
Setting detail: THERAPIES SERIES
End: 2019-05-21
Attending: ORTHOPAEDIC SURGERY
Payer: MEDICARE

## 2019-05-21 PROCEDURE — 97110 THERAPEUTIC EXERCISES: CPT | Mod: GP | Performed by: PHYSICAL THERAPIST

## 2019-06-04 ENCOUNTER — HOSPITAL ENCOUNTER (OUTPATIENT)
Dept: PHYSICAL THERAPY | Facility: OTHER | Age: 64
Setting detail: THERAPIES SERIES
End: 2019-06-04
Attending: ORTHOPAEDIC SURGERY
Payer: MEDICARE

## 2019-06-04 PROCEDURE — 97110 THERAPEUTIC EXERCISES: CPT | Mod: GP | Performed by: PHYSICAL THERAPIST

## 2019-06-08 DIAGNOSIS — G89.4 CHRONIC PAIN SYNDROME: Primary | ICD-10-CM

## 2019-06-08 RX ORDER — GABAPENTIN 300 MG/1
1200 CAPSULE ORAL 3 TIMES DAILY
Qty: 360 CAPSULE | Refills: 1 | Status: CANCELLED | OUTPATIENT
Start: 2019-06-08

## 2019-06-10 NOTE — TELEPHONE ENCOUNTER
Requested Prescriptions   Pending Prescriptions Disp Refills     gabapentin (NEURONTIN) 300 MG capsule 360 capsule 1     Sig: Take 4 capsules (1,200 mg) by mouth 3 times daily       There is no refill protocol information for this order        Last Written Prescription Date:  3/18/2019  Last Fill Quantity: 360  # refills: 1  Last office visit: No previous visit found with prescribing provider:  5/29/2018 with REN Daily   Future Office Visit:      Routing refill request to provider for review/approval because:  Drug not on the [facility] refill protocol

## 2019-06-12 DIAGNOSIS — G89.4 CHRONIC PAIN SYNDROME: ICD-10-CM

## 2019-06-12 DIAGNOSIS — K59.01 SLOW TRANSIT CONSTIPATION: ICD-10-CM

## 2019-06-12 NOTE — TELEPHONE ENCOUNTER
Reason for call:  Medication   If this is a refill request, has the caller requested the refill from the pharmacy already? N/A  Will the patient be using a [facility]? Yes  Name of the pharmacy and phone number for the current request: [facility], [place] - [address]    Name of the medication requested: oxyCODONE-acetaminophen (PERCOCET) 5-325 MG tablet    Other request: Please send to [facility]    Phone number to reach patient:  Home number on file [phone] (home)      Ainsley Sky    [facility]

## 2019-06-12 NOTE — TELEPHONE ENCOUNTER
Reason For Call:   Chief Complaint   Patient presents with     Opioid Refill     Percocet        Medication Name, Dose and Monthly Quantity:   Oxycodone: Dose 5-325 Schedule 1 tablet every 6 hours as needed for pain Monthly Quantity 90     Diagnosis requiring opiates:   Chronic pain syndrome [G89.4]    Problem List Updated:   Yes    Opioid Agreement On File - [facility] PAIN CONTRACT ID# [id number]:  Yes    Last Urine Drug Screen (at least once every 12 months) Date:   5/8/18, patient to complete UDS at next follow up appointment.  Unexpected Results:   No.    [place]  Data Reviewed (at least once every 3 months) Date:   5/20/19   Unexpected Results:    No.    Last Fill Date:   5/20/19    Due Date:   7/12/19    Last Visit with Provider:   5/20/19    Future Visits with Provider:   Yes:  Date 7/19/19.    Processing:   Mail to pharmacy.     Geni Simpson MA

## 2019-06-12 NOTE — TELEPHONE ENCOUNTER
Medication refill information reviewed. This patient needs an updated urine drug screen and controlled substance agreement.    Due date for oxyCODONE-acetaminophen (PERCOCET) 5-325 MG tablet is 6/19/19     Prescriptions prepped for review.     Will route to provider.

## 2019-06-13 RX ORDER — OXYCODONE AND ACETAMINOPHEN 5; 325 MG/1; MG/1
1 TABLET ORAL EVERY 6 HOURS PRN
Qty: 90 TABLET | Refills: 0 | Status: SHIPPED | OUTPATIENT
Start: 2019-06-13 | End: 2019-07-19

## 2019-06-13 RX ORDER — DOCUSATE SODIUM 100 MG/1
CAPSULE, LIQUID FILLED ORAL
Qty: 100 CAPSULE | Refills: 0 | Status: SHIPPED | OUTPATIENT
Start: 2019-06-13

## 2019-06-13 NOTE — TELEPHONE ENCOUNTER
Percocet refilled. Her CSA is up to date, she signed one with me on 2/18/2019. Will repeat UDS at her next office visit in July.     Denise Moss PA-C on 6/13/2019 at 7:59 AM

## 2019-06-13 NOTE — TELEPHONE ENCOUNTER
"Requested Prescriptions   Pending Prescriptions Disp Refills      MG capsule [Pharmacy Med Name: DOK 100MG CAPS] 100 capsule 0     Sig: TAKE ONE CAPSULE BY MOUTH ONCE DAILY   Last Written Prescription Date:  4/6/2019  Last Fill Quantity: 100,  # refills: 0   Last office visit: 12/5/2017 with prescribing provider:     Future Office Visit:   Next 5 appointments (look out 90 days)    Jul 19, 2019  2:00 PM CDT  Return Visit with Denise Moss PA-C  [facility] ([address] [address]  [phone]             Laxatives Protocol Failed - 6/12/2019  1:12 PM        Failed - Recent (12 mo) or future (30 days) visit within the authorizing provider's specialty     Patient had office visit in the last 12 months or has a visit in the next 30 days with authorizing provider or within the authorizing provider's specialty.  See \"Patient Info\" tab in inbasket, or \"Choose Columns\" in Meds & Orders section of the refill encounter.              Passed - Patient is age 6 or older        Passed - Medication is active on med list      Routing refill request to provider for review/approval because:  Patient needs to be seen because it has been more than 1 year since last office visit.    Will forward to schedulers to schedule patient for overdue yearly routine OV.  Chantel Martínez RN            "

## 2019-06-14 DIAGNOSIS — G89.4 CHRONIC PAIN SYNDROME: ICD-10-CM

## 2019-06-14 DIAGNOSIS — M79.18 MYOFASCIAL PAIN: ICD-10-CM

## 2019-06-14 RX ORDER — TIZANIDINE 2 MG/1
TABLET ORAL
Qty: 90 TABLET | Refills: 1 | Status: SHIPPED | OUTPATIENT
Start: 2019-06-14 | End: 2019-07-19

## 2019-06-18 ENCOUNTER — HOSPITAL ENCOUNTER (OUTPATIENT)
Dept: PHYSICAL THERAPY | Facility: OTHER | Age: 64
Setting detail: THERAPIES SERIES
End: 2019-06-18
Attending: ORTHOPAEDIC SURGERY
Payer: MEDICARE

## 2019-06-18 PROCEDURE — 97110 THERAPEUTIC EXERCISES: CPT | Mod: GP | Performed by: PHYSICAL THERAPIST

## 2019-06-19 ENCOUNTER — TELEPHONE (OUTPATIENT)
Dept: FAMILY MEDICINE | Facility: OTHER | Age: 64
End: 2019-06-19

## 2019-06-19 RX ORDER — GABAPENTIN 300 MG/1
1200 CAPSULE ORAL 3 TIMES DAILY
Qty: 360 CAPSULE | Refills: 1 | Status: SHIPPED | OUTPATIENT
Start: 2019-06-19 | End: 2019-07-19

## 2019-06-19 NOTE — TELEPHONE ENCOUNTER
Reason for call:  Medication   If this is a refill request, has the caller requested the refill from the pharmacy already? No  Will the patient be using a [facility]? Yes  Name of the pharmacy and phone number for the current request: [facility]    Name of the medication requested: gabapentin (NEURONTIN) 300 MG capsule    Other request: n/a    Phone number to reach patient:  Home number on file [phone] (home)    Best Time:  n/a    Can we leave a detailed message on this number?  YES       Va HOGUE    [facility]

## 2019-07-02 NOTE — PROGRESS NOTES
Outpatient Physical Therapy Discharge Note     Patient: Yusra Rivas  : 1955    Beginning/End Dates of Reporting Period:  2019 to 2019    Referring Provider: mara Moscoso Diagnosis: right hip pain      Client Self Report: legs feel heavy today     Objective Measurements:     Details: pain 5-8/10 LEFS 24/80 at eval   Objective Measure: LEFS 26/80  Details: currently pain 4-8/10 and 40% less 8/10 , overall is making gains on her strength       patient seen for 5 Rx sessions for exercises in clinic and progression of HEP at last Rx she was completing the following   bridges , side hip abduction , prone knee flexion 15x , sitting hamstring stretch hold 10 x 5 biodex x 15 level 5  , bosu 3 mintues at bar , at home will add single leg balance x 1 minute      Goals:  Goal Identifier 1   Goal Description instruction in HEP and compliant with it 5 of 7 days    Target Date 19   Date Met      Progress:     Goal Identifier 2   Goal Description patient to be able to walk standing straight    Target Date 19   Date Met      Progress:     Goal Identifier 3   Goal Description patient to have reduction in pain level currently 5-8/10 goal is 5-6/10   Target Date 19   Date Met      Progress:       Progress Toward Goals:   Progress this reporting period: patient is very compliant with HEP and has made good gains on overall decrease pain and increase strength and is ready for discharge to HEP , but her pain is improved but not resolved           Plan:  Discharge from therapy.    Discharge:    Reason for Discharge: No further expectation of progress.    Equipment Issued: none    Discharge Plan: Patient to continue home program.

## 2019-07-02 NOTE — ADDENDUM NOTE
Encounter addended by: Yusra Rolle, PT on: 7/2/2019 11:33 AM   Actions taken: Episode resolved, Sign clinical note

## 2019-07-15 DIAGNOSIS — G89.4 CHRONIC PAIN SYNDROME: ICD-10-CM

## 2019-07-16 RX ORDER — DULOXETIN HYDROCHLORIDE 20 MG/1
20 CAPSULE, DELAYED RELEASE ORAL 2 TIMES DAILY
Qty: 60 CAPSULE | Refills: 0 | Status: SHIPPED | OUTPATIENT
Start: 2019-07-16 | End: 2019-07-19

## 2019-07-16 NOTE — TELEPHONE ENCOUNTER
"duloxetine  Last Written Prescription Date:  5/20/2019  Last Fill Quantity: 60,  # refills: 1   Last office visit: No previous visit found with prescribing provider:  12/5/2017   Future Office Visit:   Next 5 appointments (look out 90 days)    Jul 19, 2019  2:00 PM CDT  [facility] PA-C  [facility] [address] [place] [id number] [phone]           Requested Prescriptions   Pending Prescriptions Disp Refills     DULoxetine (CYMBALTA) 20 MG capsule 60 capsule 1     Sig: Take 1 capsule (20 mg) by mouth 2 times daily       Serotonin-Norepinephrine Reuptake Inhibitors  Failed - 7/15/2019 12:50 PM        Failed - Recent (12 mo) or future (30 days) visit within the authorizing provider's specialty     Patient had office visit in the last 12 months or has a visit in the next 30 days with authorizing provider or within the authorizing provider's specialty.  See \"Patient Info\" tab in inbasket, or \"Choose Columns\" in Meds & Orders section of the refill encounter.              Passed - Blood pressure under 140/90 in past 12 months     BP Readings from Last 3 Encounters:   05/20/19 106/58   04/26/19 114/60   04/19/19 138/82           Passed - Medication is active on med list        Passed - Patient is age 18 or older        Passed - No active pregnancy on record        Passed - No positive pregnancy test in past 12 months          Routing refill request to provider for review/approval because:  Patient needs to be seen because it has been more than 1 year since last office visit. Reminder/fátima given.  Patient was only seen one time by Sujit.   Nicki Clements RN on 7/16/2019 at 12:16 PM    "

## 2019-07-17 NOTE — PROGRESS NOTES
[facility]    CHIEF COMPLAINT: Pain  -Low back pain with radiation to the right leg    INTERVAL HISTORY:  Last seen on 5/20/2019.        Recommendations/plan at the last visit included:  1. Physical Therapy:  Yes, continue current plan  2. Clinical Health Psychologist:  NO    3. Diagnostic Studies:  None at this time  4. Medication Management:    1. Continue Gabapentin 900mg in AM, 600mg in afternoon, 900mg at HS.  2. Continue Tizanidine 2mg, 1-2 tabs TID PRN muscle spasms/pain  3. Continue Cymbalta 20mg BID.   4. Continue Percocet 2 to 3/day with a max of 3/day.  5. Further procedures recommended: Lumbar TPI as needed      Follow up with this provider: 8 Weeks     Since her last visit, Yusra Rivas reports:    She is doing much better with the Cymbalta. She states that her pain is under much better control. She has 7 Percocet left.  She has been able to do more activities as well. She has completed physical therapy.      Pain Information:   Pain quality: Aching, numb, and throbbing   Pain rating: intensity ranges from 1/10 to 6/10, and averages 4/10 on a 0-10 scale.   Pain today 4/10      Annual Controlled Substance Agreement/UDS:  UDS: 5/18/2018  CSA: 2/18/2019    CURRENT RELEVANT PAIN MEDICATIONS:  Percocet 5/325 2-3/day  Gabapentin 900mg in AM, 600mg Afternoon, 900mg at bedtime   Tizanidine 2mg-takes 2 at night  Cymbalta 20mg BID    Patient is using the medication as prescribed:  YES  Is your medication helpful? somewhat  Medication side effects? no side effect    Previous Medications: (H--helped; HI--Helped initially; SWH-- somewhat helpful, NH--No help; W--worse; SE--side effects)   Opiates: Morphine SE constipation, Percocet no more helpful than hydrocodone  NSAIDS: Indomethacin SE GI problems  Muscle Relaxants: Flexeril SE sedation, Robaxin SE sedation  Anti-migraine mediations: none  Anti-depressants: Amitriptyline SE gassy  Sleep aids: none  Anxiolytics: none  Neuropathics:  Gabapentin H    Topicals: none  Other medications not covered above: Tylenol NH    Past Pain Treatments:  Pain Clinic:   Yes, she was at on in [place] after the Brain injury.   PT: Yes, was done last summer. No pool therapy  Psychologist: Yes, has seen Santo  Relaxation techniques/biofeedback: No  Chiropractor: No  Acupuncture: Yes, made pain worse  Pharmacotherapy:               Opioids: Yes                Non-opioids:    Yes   TENs Unit: No  Injections: Yes, - 11/5/18 lumbar TFESI right L5-S1  - 12/12/16 caudal epidural steroid injection  - 8/28/17 right L5-S1 facet and right SI joint injection  - 1/22/18 SCS trial  - 6/4/18 right hip injection (Dr Christensen)  - 7/25/18 right hip bursa injection, lumbar TPI  Self-care:   Yes, hot showers, heated blanket  Surgeries related to pain: Yes, fusion and jaskaran placement.    [place] Board of Pharmacy Data Base Reviewed:    YES; As expected, no concern for misuse/abuse of controlled medications based on this report.        THE 4 As OF OPIOID MAINTENANCE ANALGESIA    Analgesia: Is pain relief clinically significant? yes  Activity: Is patient functional and able to perform Activities of Daily Living? Yes  Adverse effects: Is patient free from adverse side effects from opiates? YES   Adherence to Rx protocol: Is patient adhering to Controlled Substance Agreement and taking medications ONLY as ordered? YES       Is Narcan prescribed for opiate use >50 MME daily? N/A      Daily MME: 15-22.5    Medications:  Current Outpatient Medications   Medication Sig Dispense Refill     Cyanocobalamin (VITAMIN B-12 PO) Take 1,000 mcg by mouth daily         MG capsule TAKE ONE CAPSULE BY MOUTH ONCE DAILY 100 capsule 0     DULoxetine (CYMBALTA) 20 MG capsule Take 1 capsule (20 mg) by mouth 2 times daily 60 capsule 2     gabapentin (NEURONTIN) 300 MG capsule Take 4 capsules (1,200 mg) by mouth 3 times daily 360 capsule 2     metoprolol tartrate (LOPRESSOR) 50 MG tablet TAKE ONE TABLET BY  MOUTH TWICE A DAY 60 tablet 0     Multiple Vitamins-Minerals (I-GOLDIE) TABS TAKE ONE TABLET BY MOUTH EVERY DAY 60 tablet 8     oxyCODONE-acetaminophen (PERCOCET) 5-325 MG tablet Take 1 tablet by mouth every 6 hours as needed for pain Max 3/day. May fill on 7/19/19 to start on/after 7/20/19 90 tablet 0     Pyridoxine HCl (VITAMIN B6 PO) Take 100 mg by mouth daily        ranitidine (ZANTAC) 150 MG tablet Take 150 mg by mouth 2 times daily       tiZANidine (ZANAFLEX) 2 MG tablet 1-2 tablet three times a day as needed for muscle pain 90 tablet 2     VITAMIN D3 1000 UNITS tablet TAKE TWO TABLETS BY MOUTH EVERY  tablet 3     polyethylene glycol (MIRALAX/GLYCOLAX) powder STIR 17 GM OF POWDER (SEE DEXTER INSIDE CAP) IN 8-OZ OF LIQUID UNTIL COMPLETELY DISSOLVED. DRINK THE SOLUTION DAILY OR AS DIRECTED. (Patient not taking: Reported on 7/19/2019) 527 g 0       Review of Systems: A 10-point review of systems was negative, with the exception of chronic pain issues and rash.      Social History: Reviewed; unchanged from previous consultation.      Family history: Reviewed; unchanged from previous consultation.     PHYSICAL EXAM:     Vitals:  /78 (BP Location: Left arm, Patient Position: Chair, Cuff Size: Adult Large)   Pulse 68   Resp 16   Wt 82.8 kg (182 lb 8 oz)   BMI 28.58 kg/m    Body mass index is 28.58 kg/m .  Data Unavailable  182 lbs 8 oz      Constitutional: healthy, alert and no distress  HEENT: Head atraumatic, normocephalic. Eyes without conjunctival injection or jaundice. Neck supple. No obvious neck masses.  Skin: No rash, lesions, or petechiae of exposed skin.   Psychiatric/mental status: Alert, without lethargy or stupor. Appropriate affect. Mood normal.   Neurologic exam: Walking with a cane today    DIAGNOSTIC TESTS:  Imaging Studies:   No new imaging to review today    Assessment:  Yusra Rivas is a 63 year old female who presents today for follow up regarding her:    1.  Chronic pain  syndrome  2.  Chronic bilateral low back pain with radiation to bilateral legs  3.  Myofascial pain/dystonia  4.  Right hip and groin pain    Patient's pain is been under much better control.  She has been using her Percocet very appropriately.  She has not self escalated or asked for early refills.  Given the stability of her pain I think it would be appropriate for her to go back to her primary care provider.  Patient will discuss this with them.  She can of course continue to follow-up with me as needed for trigger point injections.      Plan:    Diagnosis reviewed, treatment option addressed, and risk/benifits discussed.  Self-care instructions given.  I am recommending a multidisciplinary treatment plan to help this patient better manage pain.        1. Physical Therapy:  Yes, continue current plan  2. Clinical Health Psychologist:  NO    3. Diagnostic Studies:  None at this time  4. Medication Management:    1. Continue Gabapentin 900mg in AM,  600mg in afternoon, 900mg at HS. Refilled today  2. Continue Tizanidine 2mg, 1-2 tabs TID PRN muscle spasms/pain. Refilled today  3. Continue Cymbalta 20mg BID. Refilled today.  4. Continue Percocet 2 to 3/day with a max of 3/day. To start 7/20/2019 or after  5. Further procedures recommended: Lumbar TPI as needed      Follow up with this provider: 8 Weeks     Total time spent face to face was 15 minutes and more than 50% of face to face time was spent in counseling and/or coordination of care regarding the diagnosis and recommendations above.      Denise Moss PA-C   [facility]

## 2019-07-19 ENCOUNTER — OFFICE VISIT (OUTPATIENT)
Dept: PALLIATIVE MEDICINE | Facility: CLINIC | Age: 64
End: 2019-07-19
Payer: MEDICARE

## 2019-07-19 VITALS
RESPIRATION RATE: 16 BRPM | SYSTOLIC BLOOD PRESSURE: 128 MMHG | WEIGHT: 182.5 LBS | BODY MASS INDEX: 28.58 KG/M2 | DIASTOLIC BLOOD PRESSURE: 78 MMHG | HEART RATE: 68 BPM

## 2019-07-19 DIAGNOSIS — M54.41 CHRONIC BILATERAL LOW BACK PAIN WITH BILATERAL SCIATICA: Primary | ICD-10-CM

## 2019-07-19 DIAGNOSIS — M54.42 CHRONIC BILATERAL LOW BACK PAIN WITH BILATERAL SCIATICA: Primary | ICD-10-CM

## 2019-07-19 DIAGNOSIS — M79.18 MYOFASCIAL PAIN: ICD-10-CM

## 2019-07-19 DIAGNOSIS — M25.551 HIP PAIN, RIGHT: ICD-10-CM

## 2019-07-19 DIAGNOSIS — G89.4 CHRONIC PAIN SYNDROME: ICD-10-CM

## 2019-07-19 DIAGNOSIS — G89.29 CHRONIC BILATERAL LOW BACK PAIN WITH BILATERAL SCIATICA: Primary | ICD-10-CM

## 2019-07-19 PROCEDURE — 99213 OFFICE O/P EST LOW 20 MIN: CPT | Performed by: PHYSICIAN ASSISTANT

## 2019-07-19 RX ORDER — TIZANIDINE 2 MG/1
TABLET ORAL
Qty: 90 TABLET | Refills: 2 | Status: SHIPPED | OUTPATIENT
Start: 2019-07-19 | End: 2019-12-31

## 2019-07-19 RX ORDER — DULOXETIN HYDROCHLORIDE 20 MG/1
20 CAPSULE, DELAYED RELEASE ORAL 2 TIMES DAILY
Qty: 60 CAPSULE | Refills: 2 | Status: SHIPPED | OUTPATIENT
Start: 2019-07-19

## 2019-07-19 RX ORDER — OXYCODONE AND ACETAMINOPHEN 5; 325 MG/1; MG/1
1 TABLET ORAL EVERY 6 HOURS PRN
Qty: 90 TABLET | Refills: 0 | Status: SHIPPED | OUTPATIENT
Start: 2019-07-19 | End: 2019-08-26

## 2019-07-19 RX ORDER — GABAPENTIN 300 MG/1
1200 CAPSULE ORAL 3 TIMES DAILY
Qty: 360 CAPSULE | Refills: 2 | Status: SHIPPED | OUTPATIENT
Start: 2019-07-19 | End: 2019-09-06

## 2019-07-19 ASSESSMENT — PAIN SCALES - GENERAL: PAINLEVEL: MODERATE PAIN (4)

## 2019-07-19 NOTE — PATIENT INSTRUCTIONS
After Visit Instructions:     Thank you for coming to [facility] for your care. It is my goal to partner with you to help you reach your optimal state of health.     I am recommending multidisciplinary care at this time.  The focus of care will be to continue gradual rehabilitation and pain management with medication adjustments as needed.    Continue daily self-care, identifying contributing factors, and monitoring variations in pain level. Continue to integrate self-care into your life.          Schedule physical therapy assessment/visit: Continue exercises     Schedule follow-up with Denise Moss PA-C in 8 weeks. You will need to make this appointment.     Referrals: Talk to your primary about taking over your medications. This is very appropriate. He can call me if he has any questions    Medication recommendations:     Percocet 5-325-1 tablet 2-3 times/day    Continue Cymbalta    Continue Gabapentin    Continue Tizanidine      Denise Moss PA-C  [facility]  [facility]    Contact information: [facility]  Clinic Number:  [phone]     Call with any questions about your care and for scheduling assistance.     Calls are returned Monday through Friday between 8 AM and 4:30 PM. We usually get back to you within 2 business days depending on the issue/request.    If we are prescribing your medications:    For opioid medication refills, call the clinic or send a 140Fire message 7 days in advance.  Please include:    Name of requested medication    Name of the pharmacy.    For non-opioid medications, call your pharmacy directly to request a refill. Please allow 3-4 days to be processed.     Per [place] State Law:    All controlled substance prescriptions must be filled within 30 days of being written.      For those controlled substances allowing refills, pickup must occur within 30 days of last fill.      We believe regular attendance is  key to your success in our program!      Any time you are unable to keep your appointment we ask that you call us at least 24 hours in advance to cancel.This will allow us to offer the appointment time to another patient.   Multiple missed appointments may lead to dismissal from the clinic.

## 2019-07-24 LAB — PAIN DRUG SCR UR W RPTD MEDS: NORMAL

## 2019-08-26 DIAGNOSIS — G89.4 CHRONIC PAIN SYNDROME: ICD-10-CM

## 2019-08-26 RX ORDER — OXYCODONE AND ACETAMINOPHEN 5; 325 MG/1; MG/1
1 TABLET ORAL EVERY 6 HOURS PRN
Qty: 90 TABLET | Refills: 0 | Status: SHIPPED | OUTPATIENT
Start: 2019-08-26

## 2019-08-26 NOTE — TELEPHONE ENCOUNTER
Spoke to patient's spouse. He asked if the paper Rx could be sent to [place]. I will bring it to the pharmacy here to be  to [place].  Geni Simpson, CMA

## 2019-08-26 NOTE — TELEPHONE ENCOUNTER
Please process a refill of Percocet to the [facility].    MYLENE MccarthyN, RN  Care Coordinator  [facility]

## 2019-08-26 NOTE — TELEPHONE ENCOUNTER
Refill appropriate. Please let patient know that it is at the [facility].    Denise Moss PA-C on 8/26/2019 at 3:39 PM

## 2019-08-26 NOTE — TELEPHONE ENCOUNTER
Medication refill information reviewed. CSA is up to date as of 2/22/19.     Please notify patient that we do not  prescriptions unless it is an emergency.    Due date for oxyCODONE-acetaminophen (PERCOCET) 5-325 MG tablet  is anytime.     Prescriptions prepped for review.     Will route to provider.

## 2019-08-26 NOTE — TELEPHONE ENCOUNTER
patient requesting refill(s) of   oxyCODONE-acetaminophen (PERCOCET) 5-325 MG tablet    Last picked up from pharmacy on 07/19/2019    Pt last seen by prescribing provider on 07/19/2019  Next appt scheduled for 09/11/19     checked in the past 6 months? Yes If no, print current report and give to RN    Last urine drug screen date 07/19/2019  Current opioid agreement on file (completed within the last year) No Date of opioid agreement: 07/23/2018      [facility]   [place] [place] [facility]  [address] [place] [id number]  Phone: [phone] Fax: [phone]        Will route to nursing pool for review and preparation of prescription(s).

## 2019-08-26 NOTE — TELEPHONE ENCOUNTER
Reason for Call:  Other appointment    Detailed comments: Patient calling to get a refill on her pain medication. Would like it to be courierred it over to [facility] if possible.    Phone Number Patient can be reached at: Home number on file [phone] (home)    Best Time: any    Can we leave a detailed message on this number? YES    Call taken on 8/26/2019 at 1:56 PM by Graciela Espinoza

## 2019-09-06 DIAGNOSIS — G89.4 CHRONIC PAIN SYNDROME: ICD-10-CM

## 2019-09-06 RX ORDER — GABAPENTIN 300 MG/1
1200 CAPSULE ORAL 3 TIMES DAILY
Qty: 360 CAPSULE | Refills: 2 | Status: SHIPPED | OUTPATIENT
Start: 2019-09-06

## 2019-09-06 NOTE — TELEPHONE ENCOUNTER
Signed Prescriptions:                        Disp   Refills    gabapentin (NEURONTIN) 300 MG capsule      360 ca*2        Sig: Take 4 capsules (1,200 mg) by mouth 3 times daily  Authorizing Provider: KIM PACHECO MD  [facility]

## 2019-12-30 DIAGNOSIS — G89.4 CHRONIC PAIN SYNDROME: ICD-10-CM

## 2019-12-30 DIAGNOSIS — M79.18 MYOFASCIAL PAIN: ICD-10-CM

## 2019-12-31 RX ORDER — TIZANIDINE 2 MG/1
TABLET ORAL
Qty: 90 TABLET | Refills: 0 | Status: SHIPPED | OUTPATIENT
Start: 2019-12-31

## 2019-12-31 NOTE — TELEPHONE ENCOUNTER
Okay for 1 month refill. Patient will need follow up visit for any further refills. If she does not feel she needs to see pain management any more she can discuss refills with her primary care provider.    Denise Moss PA-C on 12/31/2019 at 8:31 AM

## 2020-06-24 NOTE — PROGRESS NOTES
"Yusra Rivas is a 65 year old female who is being evaluated via a billable telephone visit.      The patient has been notified of following:     \"This telephone visit will be conducted via a call between you and your physician/provider. We have found that certain health care needs can be provided without the need for a physical exam.  This service lets us provide the care you need with a short phone conversation.  If a prescription is necessary we can send it directly to your pharmacy.  If lab work is needed we can place an order for that and you can then stop by our lab to have the test done at a later time.    Telephone visits are billed at different rates depending on your insurance coverage. During this emergency period, for some insurers they may be billed the same as an in-person visit.  Please reach out to your insurance provider with any questions.    If during the course of the call the physician/provider feels a telephone visit is not appropriate, you will not be charged for this service.\"    Patient has given verbal consent for Telephone visit?  Yes    What phone number would you like to be contacted at? [phone]    How would you like to obtain your AVS? Mail a copy    Oncology Rooming Note    June 24, 2020 10:45 AM   Yusra Rvias is a 65 year old female who presents for:    No chief complaint on file.    Allergies reviewed: Yes  Medications reviewed: Yes    Medications: Medication refills not needed today.              "

## 2020-06-30 ENCOUNTER — VIRTUAL VISIT (OUTPATIENT)
Dept: ONCOLOGY | Facility: CLINIC | Age: 65
End: 2020-06-30
Payer: MEDICARE

## 2020-06-30 DIAGNOSIS — I10 HYPERTENSION GOAL BP (BLOOD PRESSURE) < 130/80: ICD-10-CM

## 2020-06-30 DIAGNOSIS — C50.811 MALIGNANT NEOPLASM OF OVERLAPPING SITES OF RIGHT BREAST IN FEMALE, ESTROGEN RECEPTOR NEGATIVE (H): ICD-10-CM

## 2020-06-30 DIAGNOSIS — Z17.1 MALIGNANT NEOPLASM OF OVERLAPPING SITES OF RIGHT BREAST IN FEMALE, ESTROGEN RECEPTOR NEGATIVE (H): ICD-10-CM

## 2020-06-30 DIAGNOSIS — C34.90 MALIGNANT NEOPLASM OF LUNG, UNSPECIFIED LATERALITY, UNSPECIFIED PART OF LUNG (H): Primary | ICD-10-CM

## 2020-06-30 DIAGNOSIS — E78.5 HYPERLIPIDEMIA WITH TARGET LDL LESS THAN 130: ICD-10-CM

## 2020-06-30 PROCEDURE — 99443 ZZC PHYSICIAN TELEPHONE EVALUATION 21-30 MIN: CPT | Performed by: INTERNAL MEDICINE

## 2020-06-30 RX ORDER — BUDESONIDE 3 MG/1
6 CAPSULE, COATED PELLETS ORAL EVERY MORNING
COMMUNITY

## 2020-06-30 NOTE — LETTER
"    6/30/2020         RE: Yusra Rivas  [address]  [place] [address]        Dear Colleague,    Thank you for referring your patient, Yusra Rivas, to the [facility]. Please see a copy of my visit note below.    Yusra Rivas is a 65 year old female who is being evaluated via a billable telephone visit.      The patient has been notified of following:     \"This telephone visit will be conducted via a call between you and your physician/provider. We have found that certain health care needs can be provided without the need for a physical exam.  This service lets us provide the care you need with a short phone conversation.  If a prescription is necessary we can send it directly to your pharmacy.  If lab work is needed we can place an order for that and you can then stop by our lab to have the test done at a later time.    Telephone visits are billed at different rates depending on your insurance coverage. During this emergency period, for some insurers they may be billed the same as an in-person visit.  Please reach out to your insurance provider with any questions.    If during the course of the call the physician/provider feels a telephone visit is not appropriate, you will not be charged for this service.\"    Patient has given verbal consent for Telephone visit?  Yes    What phone number would you like to be contacted at? [phone]    How would you like to obtain your AVS? Mail a copy    Oncology Rooming Note    June 24, 2020 10:45 AM   Yusra Rivas is a 65 year old female who presents for:    No chief complaint on file.    Allergies reviewed: Yes  Medications reviewed: Yes    Medications: Medication refills not needed today.                DISCHARGE PLAN:  Next appointments: See patient instruction section  Telephone or virtual visit.    Yusra Rivas had a telephone/Virtual visit for Oncology follow up.  Patient instructions completed per Dr. Ott's post call instructions.  " Patient to have labs and CT scan soon.  Follow up in 1 year with labs.  Appointments called to patient for labs and CT and AVS mailed to patient to schedule follow up.  Reminder sent to follow up and confirm scheduled in 1 year.  See patient instructions and Oncologist's Progress note for further details. Questions and concerns addressed to patient's satisfaction. Patient verbalized and demonstrated understanding of plan.  Contact information provided and patient is encouraged to call with any that arise in the interim of care.    Jeffry Smith, RN, BSN, OCN   Oncology Care Coordinator RN  [facility]  [phone]  6/30/2020, 4:44 PM          Hematology/ Oncology Telephone Visit:  Jun 30, 2020    Due to the concerns around COVID-19 and adhering to social distancing we conducted this visit over the telephone.      Reason for Visit:   Chief Complaint   Patient presents with     Oncology Clinic Visit     1 yr f/u, Breast Cancer       Oncologic History:    Malignant neoplasm of overlapping sites of breast in female, estrogen receptor negative (H)  Malignant neoplasm of overlapping sites of breast in female, estrogen receptor negative (H)  Yusra Rivas was diagnosed in 2000, had a lumpectomy, eventually double mastectomy, TRAM flap procedure, identified right side breast cancer, grade 3 infiltrating ductal cancer, 1 out of 5 positive nodes, ER/AK negative. HER-2/abelino was not done. She received AC followed by Taxol, finished end of 2001. She is on routine surveillance with breast cancer, found to have increasing size of the pulmonary nodules. PET scan 01/2013 found hypermetabolic right upper lobe anterior lateral portion apex 2.4 cm lesion, SUV 14.7. CT-guided right upper lobe biopsy 01/2013 identified poorly differentiated carcinoma, favor lung primary after IHC stain, HER-2 IHC is negative.  She had bilobectomy 2/2013 found to have RLL 1.8 cm adenoCA, mod diff, Grade II; RUL 2.8 cm, mod to poorly diff adeno ca,  grade III, total 18 LN - from stations 4, 9. 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  She has been feeling well without any recent complaints weight loss or night sweats or fever or chills.  She denies any nausea vomiting or diarrhea.  She denies any shortness of breath or cough or wheezing.    Review of systems:  Pertinent positives have been included in HPI; remainder of detailed complete 20-point ROS was negative.    Past medical, social, surgical, and family histories reviewed.    Allergies:  Allergies as of 06/30/2020 - Reviewed 06/30/2020   Allergen Reaction Noted     Celexa [citalopram hydrobromide] Difficulty breathing 05/04/2010     Cardizem cd  03/16/2012     Zonisamide  05/29/2018     Naprosyn [naproxen] Palpitations 12/12/2016     Relafen [nabumetone] Palpitations 12/12/2016       Current Medications:  Current Outpatient Medications   Medication Sig Dispense Refill     budesonide (ENTOCORT EC) 3 MG EC capsule Take 6 mg by mouth every morning       Cyanocobalamin (VITAMIN B-12 PO) Take 1,000 mcg by mouth daily         MG capsule TAKE ONE CAPSULE BY MOUTH ONCE DAILY 100 capsule 0     DULoxetine (CYMBALTA) 20 MG capsule Take 1 capsule (20 mg) by mouth 2 times daily 60 capsule 2     gabapentin (NEURONTIN) 300 MG capsule Take 4 capsules (1,200 mg) by mouth 3 times daily 360 capsule 2     metoprolol tartrate (LOPRESSOR) 50 MG tablet TAKE ONE TABLET BY MOUTH TWICE A DAY 60 tablet 0     Multiple Vitamins-Minerals (I-GOLDIE) TABS TAKE ONE TABLET BY MOUTH EVERY DAY 60 tablet 8     oxyCODONE-acetaminophen (PERCOCET) 5-325 MG tablet Take 1 tablet by mouth every 6 hours as needed for pain Max 3/day. May fill on 8/26/19 to start on/after 8/26/19 90 tablet 0     Pyridoxine HCl (VITAMIN B6 PO) Take 100 mg by mouth daily         ranitidine (ZANTAC) 150 MG tablet Take 150 mg by mouth 2 times daily       tiZANidine (ZANAFLEX) 2 MG tablet 1-2 tablet three times a day as needed for muscle pain 90 tablet 0     VITAMIN D3 1000 UNITS tablet TAKE TWO TABLETS BY MOUTH EVERY  tablet 3     polyethylene glycol (MIRALAX/GLYCOLAX) powder STIR 17 GM OF POWDER (SEE DEXTER INSIDE CAP) IN 8-OZ OF LIQUID UNTIL COMPLETELY DISSOLVED. DRINK THE SOLUTION DAILY OR AS DIRECTED. (Patient not taking: Reported on 6/30/2020) 527 g 0        Laboratory/Imaging Studies:  No visits with results within 2 Week(s) from this visit.   Latest known visit with results is:   Office Visit on 07/19/2019   Component Date Value Ref Range Status     Pain Drug SCR UR W RPTD Meds 07/19/2019 FINAL   Final    Comment: (Note)  ====================================================================  [facility] COMP DRUG ANALYSIS,UR  ====================================================================  Test                             Result       Flag       Units        Drug Present and Declared for Prescription Verification   Oxycodone                      646          EXPECTED   ng/mg creat   Oxymorphone                    374          EXPECTED   ng/mg creat   Noroxycodone                   1634         EXPECTED   ng/mg creat   Noroxymorphone                 412          EXPECTED   ng/mg creat    Sources of oxycodone are scheduled prescription medications.    Oxymorphone, noroxycodone, and noroxymorphone are expected    metabolites of oxycodone. Oxymorphone is also available as a    scheduled prescription medication.   Gabapentin                     PRESENT      EXPECTED                 Duloxetine                     PRESENT      EXPECTED                Drug Present not Declared for Prescription Verific                           ation   Acetaminophen                  PRESENT      UNEXPECTED               Metoprolol                     PRESENT      UNEXPECTED               ====================================================================  Test                      Result    Flag   Units      Ref Range        Creatinine              315              mg/dL      >=20            ====================================================================  Declared Medications:  The flagging and interpretation on this report are based on the  following declared medications.  Unexpected results may arise from  inaccuracies in the declared medications.  **Note: The testing scope of this panel includes these medications:  Duloxetine (Cymbalta)  Gabapentin  Oxycodone  ====================================================================  For clinical consultation, please call [phone].  ====================================================================  Analysis performed by [employer], [address] [place] [id number]                           2            Assessment and plan:    (C34.90) Malignant neoplasm of lung, unspecified laterality, unspecified part of lung (H)  (primary encounter diagnosis)  I reordered today laboratory test and imaging studies.  Clinically, there is no clinical evidence of lung cancer recurrence.  I will see the patient again in 1 year or sooner if there are new developments or concerns.    (I10) Hypertension goal BP (blood pressure) < 130/80  Patient currently on metoprolol 50 mg orally daily.    (C50.811,  Z17.1) Malignant neoplasm of overlapping sites of right breast in female, estrogen receptor negative (H)  No evidence of recurrence of breast cancer.    The patient is ready to learn, no apparent learning barriers were identified.  Diagnosis and treatment plans were explained to the patient. The patient expressed understanding of the content. The patient asked appropriate questions. The patient questions were answered to her satisfaction.    Telephone call lasted 25 minutes.    Sandra Arroyo MD    Chart documentation with Dragon Voice recognition  Software. Although reviewed after completion, some words and grammatical errors may remain.                                                    Again, thank you for allowing me to participate in the care of your patient.        Sincerely,        Sandra Arroyo MD

## 2020-06-30 NOTE — PATIENT INSTRUCTIONS
Please arrange for laboratory tests and CT scan of the chest without contrast  Follow-up in 1 year with repeat laboratory tests.    Today:  Plan for CT and labs soon.    Lab Date/Time:   7/2/20 at 9:30    CT Scan Date/Time:  7/2/20 at 10:00    Please follow up with Dr. Arroyo in 1 year with labs prior.    Lab Date/Time:    Office visit follow up with Dr. Arroyo Date/Time:     If you have any questions or concerns please feel free to call.    If you need to reschedule please call:  Clinic or Lab Appointment - [phone]  Infusion - [phone]  Imaging - [phone]    Jeffry Smith, RN, BSN, OCN  [facility]   Oncology/Hematology Care Coordinator RN  [facility]  [phone]

## 2020-06-30 NOTE — PROGRESS NOTES
DISCHARGE PLAN:  Next appointments: See patient instruction section  Telephone or virtual visit.    Yusra Rivas had a telephone/Virtual visit for Oncology follow up.  Patient instructions completed per Dr. Ott's post call instructions.  Patient to have labs and CT scan soon.  Follow up in 1 year with labs.  Appointments called to patient for labs and CT and AVS mailed to patient to schedule follow up.  Reminder sent to follow up and confirm scheduled in 1 year.  See patient instructions and Oncologist's Progress note for further details. Questions and concerns addressed to patient's satisfaction. Patient verbalized and demonstrated understanding of plan.  Contact information provided and patient is encouraged to call with any that arise in the interim of care.    Jeffry Smith, RN, BSN, OCN   Oncology Care Coordinator [facility]  [phone]  6/30/2020, 4:44 PM

## 2020-06-30 NOTE — LETTER
"    6/30/2020         RE: Yusra Rivas  [address]  [place] [address]        Dear Colleague,    Thank you for referring your patient, Yusra Rivas, to the [facility]. Please see a copy of my visit note below.    Yusra Rivas is a 65 year old female who is being evaluated via a billable telephone visit.      The patient has been notified of following:     \"This telephone visit will be conducted via a call between you and your physician/provider. We have found that certain health care needs can be provided without the need for a physical exam.  This service lets us provide the care you need with a short phone conversation.  If a prescription is necessary we can send it directly to your pharmacy.  If lab work is needed we can place an order for that and you can then stop by our lab to have the test done at a later time.    Telephone visits are billed at different rates depending on your insurance coverage. During this emergency period, for some insurers they may be billed the same as an in-person visit.  Please reach out to your insurance provider with any questions.    If during the course of the call the physician/provider feels a telephone visit is not appropriate, you will not be charged for this service.\"    Patient has given verbal consent for Telephone visit?  Yes    What phone number would you like to be contacted at? [phone]    How would you like to obtain your AVS? Mail a copy    Oncology Rooming Note    June 24, 2020 10:45 AM   Yusra Rivas is a 65 year old female who presents for:    No chief complaint on file.    Allergies reviewed: Yes  Medications reviewed: Yes    Medications: Medication refills not needed today.                DISCHARGE PLAN:  Next appointments: See patient instruction section  Telephone or virtual visit.    Yusra Rivas had a telephone/Virtual visit for Oncology follow up.  Patient instructions completed per Dr. Ott's post call instructions.  " Patient to have labs and CT scan soon.  Follow up in 1 year with labs.  Appointments called to patient for labs and CT and AVS mailed to patient to schedule follow up.  Reminder sent to follow up and confirm scheduled in 1 year.  See patient instructions and Oncologist's Progress note for further details. Questions and concerns addressed to patient's satisfaction. Patient verbalized and demonstrated understanding of plan.  Contact information provided and patient is encouraged to call with any that arise in the interim of care.    Jeffry Smith, RN, BSN, OCN   Oncology Care Coordinator RN  [facility]  [phone]  6/30/2020, 4:44 PM          Hematology/ Oncology Telephone Visit:  Jun 30, 2020    Due to the concerns around COVID-19 and adhering to social distancing we conducted this visit over the telephone.      Reason for Visit:   Chief Complaint   Patient presents with     Oncology Clinic Visit     1 yr f/u, Breast Cancer       Oncologic History:    Malignant neoplasm of overlapping sites of breast in female, estrogen receptor negative (H)  Malignant neoplasm of overlapping sites of breast in female, estrogen receptor negative (H)  Yusra Rivas was diagnosed in 2000, had a lumpectomy, eventually double mastectomy, TRAM flap procedure, identified right side breast cancer, grade 3 infiltrating ductal cancer, 1 out of 5 positive nodes, ER/[place] negative. HER-2/abelino was not done. She received AC followed by Taxol, finished end of 2001. She is on routine surveillance with breast cancer, found to have increasing size of the pulmonary nodules. PET scan 01/2013 found hypermetabolic right upper lobe anterior lateral portion apex 2.4 cm lesion, SUV 14.7. CT-guided right upper lobe biopsy 01/2013 identified poorly differentiated carcinoma, favor lung primary after IHC stain, HER-2 IHC is negative.  She had bilobectomy 2/2013 found to have RLL 1.8 cm adenoCA, mod diff, Grade II; RUL 2.8 cm, mod to poorly diff adeno ca,  grade III, total 18 LN - from stations 4, 9. 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  She has been feeling well without any recent complaints weight loss or night sweats or fever or chills.  She denies any nausea vomiting or diarrhea.  She denies any shortness of breath or cough or wheezing.    Review of systems:  Pertinent positives have been included in HPI; remainder of detailed complete 20-point ROS was negative.    Past medical, social, surgical, and family histories reviewed.    Allergies:  Allergies as of 06/30/2020 - Reviewed 06/30/2020   Allergen Reaction Noted     Celexa [citalopram hydrobromide] Difficulty breathing 05/04/2010     Cardizem cd  03/16/2012     Zonisamide  05/29/2018     Naprosyn [naproxen] Palpitations 12/12/2016     Relafen [nabumetone] Palpitations 12/12/2016       Current Medications:  Current Outpatient Medications   Medication Sig Dispense Refill     budesonide (ENTOCORT EC) 3 MG EC capsule Take 6 mg by mouth every morning       Cyanocobalamin (VITAMIN B-12 PO) Take 1,000 mcg by mouth daily         MG capsule TAKE ONE CAPSULE BY MOUTH ONCE DAILY 100 capsule 0     DULoxetine (CYMBALTA) 20 MG capsule Take 1 capsule (20 mg) by mouth 2 times daily 60 capsule 2     gabapentin (NEURONTIN) 300 MG capsule Take 4 capsules (1,200 mg) by mouth 3 times daily 360 capsule 2     metoprolol tartrate (LOPRESSOR) 50 MG tablet TAKE ONE TABLET BY MOUTH TWICE A DAY 60 tablet 0     Multiple Vitamins-Minerals (I-GOLDIE) TABS TAKE ONE TABLET BY MOUTH EVERY DAY 60 tablet 8     oxyCODONE-acetaminophen (PERCOCET) 5-325 MG tablet Take 1 tablet by mouth every 6 hours as needed for pain Max 3/day. May fill on 8/26/19 to start on/after 8/26/19 90 tablet 0     Pyridoxine HCl (VITAMIN B6 PO) Take 100 mg by mouth daily         ranitidine (ZANTAC) 150 MG tablet Take 150 mg by mouth 2 times daily       tiZANidine (ZANAFLEX) 2 MG tablet 1-2 tablet three times a day as needed for muscle pain 90 tablet 0     VITAMIN D3 1000 UNITS tablet TAKE TWO TABLETS BY MOUTH EVERY  tablet 3     polyethylene glycol (MIRALAX/GLYCOLAX) powder STIR 17 GM OF POWDER (SEE DEXTER INSIDE CAP) IN 8-OZ OF LIQUID UNTIL COMPLETELY DISSOLVED. DRINK THE SOLUTION DAILY OR AS DIRECTED. (Patient not taking: Reported on 6/30/2020) 527 g 0        Laboratory/Imaging Studies:  No visits with results within 2 Week(s) from this visit.   Latest known visit with results is:   Office Visit on 07/19/2019   Component Date Value Ref Range Status     Pain Drug SCR UR W RPTD Meds 07/19/2019 FINAL   Final    Comment: (Note)  ====================================================================  [facility] COMP DRUG ANALYSIS,UR  ====================================================================  Test                             Result       Flag       Units        Drug Present and Declared for Prescription Verification   Oxycodone                      646          EXPECTED   ng/mg creat   Oxymorphone                    374          EXPECTED   ng/mg creat   Noroxycodone                   1634         EXPECTED   ng/mg creat   Noroxymorphone                 412          EXPECTED   ng/mg creat    Sources of oxycodone are scheduled prescription medications.    Oxymorphone, noroxycodone, and noroxymorphone are expected    metabolites of oxycodone. Oxymorphone is also available as a    scheduled prescription medication.   Gabapentin                     PRESENT      EXPECTED                 Duloxetine                     PRESENT      EXPECTED                Drug Present not Declared for Prescription Verific                           ation   Acetaminophen                  PRESENT      UNEXPECTED               Metoprolol                     PRESENT      UNEXPECTED               ====================================================================  Test                      Result    Flag   Units      Ref Range        Creatinine              315              mg/dL      >=20            ====================================================================  Declared Medications:  The flagging and interpretation on this report are based on the  following declared medications.  Unexpected results may arise from  inaccuracies in the declared medications.  **Note: The testing scope of this panel includes these medications:  Duloxetine (Cymbalta)  Gabapentin  Oxycodone  ====================================================================  For clinical consultation, please call [phone].  ====================================================================  Analysis performed by [facility], [address] [place] [id number]                           2            Assessment and plan:    (C34.90) Malignant neoplasm of lung, unspecified laterality, unspecified part of lung (H)  (primary encounter diagnosis)  I reordered today laboratory test and imaging studies.  Clinically, there is no clinical evidence of lung cancer recurrence.  I will see the patient again in 1 year or sooner if there are new developments or concerns.    (I10) Hypertension goal BP (blood pressure) < 130/80  Patient currently on metoprolol 50 mg orally daily.    (C50.811,  Z17.1) Malignant neoplasm of overlapping sites of right breast in female, estrogen receptor negative (H)  No evidence of recurrence of breast cancer.    The patient is ready to learn, no apparent learning barriers were identified.  Diagnosis and treatment plans were explained to the patient. The patient expressed understanding of the content. The patient asked appropriate questions. The patient questions were answered to her satisfaction.    Telephone call lasted 25 minutes.    Sandra Arroyo MD    Chart documentation with Dragon Voice recognition  Software. Although reviewed after completion, some words and grammatical errors may remain.                                                    Again, thank you for allowing me to participate in the care of your patient.        Sincerely,        Sandra Arroyo MD

## 2020-07-01 NOTE — PROGRESS NOTES
Hematology/ Oncology Telephone Visit:  Jun 30, 2020    Due to the concerns around COVID-19 and adhering to social distancing we conducted this visit over the telephone.      Reason for Visit:   Chief Complaint   Patient presents with     Oncology Clinic Visit     1 yr f/u, Breast Cancer       Oncologic History:    Malignant neoplasm of overlapping sites of breast in female, estrogen receptor negative (H)  Malignant neoplasm of overlapping sites of breast in female, estrogen receptor negative (H)  Yusra Rivas was diagnosed in 2000, had a lumpectomy, eventually double mastectomy, TRAM flap procedure, identified right side breast cancer, grade 3 infiltrating ductal cancer, 1 out of 5 positive nodes, ER/[place] negative. HER-2/abelino was not done. She received AC followed by Taxol, finished end of 2001. She is on routine surveillance with breast cancer, found to have increasing size of the pulmonary nodules. PET scan 01/2013 found hypermetabolic right upper lobe anterior lateral portion apex 2.4 cm lesion, SUV 14.7. CT-guided right upper lobe biopsy 01/2013 identified poorly differentiated carcinoma, favor lung primary after IHC stain, HER-2 IHC is negative.  She had bilobectomy 2/2013 found to have RLL 1.8 cm adenoCA, mod diff, Grade II; RUL 2.8 cm, mod to poorly diff adeno ca, grade III, total 18 LN - from stations 4, 9. 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  She has been feeling well without any recent complaints weight loss or night sweats or fever or chills.  She denies any nausea vomiting or diarrhea.  She denies any shortness of breath or cough or wheezing.    Review of systems:  Pertinent positives have been included in HPI; remainder of detailed complete 20-point ROS was negative.    Past medical, social, surgical, and family  histories reviewed.    Allergies:  Allergies as of 06/30/2020 - Reviewed 06/30/2020   Allergen Reaction Noted     Celexa [citalopram hydrobromide] Difficulty breathing 05/04/2010     Cardizem cd  03/16/2012     Zonisamide  05/29/2018     Naprosyn [naproxen] Palpitations 12/12/2016     Relafen [nabumetone] Palpitations 12/12/2016       Current Medications:  Current Outpatient Medications   Medication Sig Dispense Refill     budesonide (ENTOCORT EC) 3 MG EC capsule Take 6 mg by mouth every morning       Cyanocobalamin (VITAMIN B-12 PO) Take 1,000 mcg by mouth daily         MG capsule TAKE ONE CAPSULE BY MOUTH ONCE DAILY 100 capsule 0     DULoxetine (CYMBALTA) 20 MG capsule Take 1 capsule (20 mg) by mouth 2 times daily 60 capsule 2     gabapentin (NEURONTIN) 300 MG capsule Take 4 capsules (1,200 mg) by mouth 3 times daily 360 capsule 2     metoprolol tartrate (LOPRESSOR) 50 MG tablet TAKE ONE TABLET BY MOUTH TWICE A DAY 60 tablet 0     Multiple Vitamins-Minerals (I-GOLDIE) TABS TAKE ONE TABLET BY MOUTH EVERY DAY 60 tablet 8     oxyCODONE-acetaminophen (PERCOCET) 5-325 MG tablet Take 1 tablet by mouth every 6 hours as needed for pain Max 3/day. May fill on 8/26/19 to start on/after 8/26/19 90 tablet 0     Pyridoxine HCl (VITAMIN B6 PO) Take 100 mg by mouth daily        ranitidine (ZANTAC) 150 MG tablet Take 150 mg by mouth 2 times daily       tiZANidine (ZANAFLEX) 2 MG tablet 1-2 tablet three times a day as needed for muscle pain 90 tablet 0     VITAMIN D3 1000 UNITS tablet TAKE TWO TABLETS BY MOUTH EVERY  tablet 3     polyethylene glycol (MIRALAX/GLYCOLAX) powder STIR 17 GM OF POWDER (SEE DEXTER INSIDE CAP) IN 8-OZ OF LIQUID UNTIL COMPLETELY DISSOLVED. DRINK THE SOLUTION DAILY OR AS DIRECTED. (Patient not taking: Reported on 6/30/2020) 527 g 0        Laboratory/Imaging Studies:  No visits with results within 2 Week(s) from this visit.   Latest known visit with results is:   Office Visit on 07/19/2019    Component Date Value Ref Range Status     Pain Drug SCR UR W RPTD Meds 07/19/2019 FINAL   Final    Comment: (Note)  ====================================================================  [facility] COMP DRUG ANALYSIS,UR  ====================================================================  Test                             Result       Flag       Units        Drug Present and Declared for Prescription Verification   Oxycodone                      646          EXPECTED   ng/mg creat   Oxymorphone                    374          EXPECTED   ng/mg creat   Noroxycodone                   1634         EXPECTED   ng/mg creat   Noroxymorphone                 412          EXPECTED   ng/mg creat    Sources of oxycodone are scheduled prescription medications.    Oxymorphone, noroxycodone, and noroxymorphone are expected    metabolites of oxycodone. Oxymorphone is also available as a    scheduled prescription medication.   Gabapentin                     PRESENT      EXPECTED                 Duloxetine                     PRESENT      EXPECTED                Drug Present not Declared for Prescription Verific                           ation   Acetaminophen                  PRESENT      UNEXPECTED               Metoprolol                     PRESENT      UNEXPECTED              ====================================================================  Test                      Result    Flag   Units      Ref Range        Creatinine              315              mg/dL      >=20            ====================================================================  Declared Medications:  The flagging and interpretation on this report are based on the  following declared medications.  Unexpected results may arise from  inaccuracies in the declared medications.  **Note: The testing scope of this panel includes these medications:  Duloxetine (Cymbalta)  Gabapentin  Oxycodone  ====================================================================  For  clinical consultation, please call [phone].  ====================================================================  Analysis performed by [facility], [address] [place] [address]                           2            Assessment and plan:    (C34.90) Malignant neoplasm of lung, unspecified laterality, unspecified part of lung (H)  (primary encounter diagnosis)  I reordered today laboratory test and imaging studies.  Clinically, there is no clinical evidence of lung cancer recurrence.  I will see the patient again in 1 year or sooner if there are new developments or concerns.    (I10) Hypertension goal BP (blood pressure) < 130/80  Patient currently on metoprolol 50 mg orally daily.    (C50.811,  Z17.1) Malignant neoplasm of overlapping sites of right breast in female, estrogen receptor negative (H)  No evidence of recurrence of breast cancer.    The patient is ready to learn, no apparent learning barriers were identified.  Diagnosis and treatment plans were explained to the patient. The patient expressed understanding of the content. The patient asked appropriate questions. The patient questions were answered to her satisfaction.    Telephone call lasted 25 minutes.    Sandra Arroyo MD    Chart documentation with Dragon Voice recognition Software. Although reviewed after completion, some words and grammatical errors may remain.

## 2020-07-01 NOTE — ASSESSMENT & PLAN NOTE
Malignant neoplasm of overlapping sites of breast in female, estrogen receptor negative (H)  Yusra Rivas was diagnosed in 2000, had a lumpectomy, eventually double mastectomy, TRAM flap procedure, identified right side breast cancer, grade 3 infiltrating ductal cancer, 1 out of 5 positive nodes, ER/[place] negative. HER-2/abelino was not done. She received AC followed by Taxol, finished end of 2001. She is on routine surveillance with breast cancer, found to have increasing size of the pulmonary nodules. PET scan 01/2013 found hypermetabolic right upper lobe anterior lateral portion apex 2.4 cm lesion, SUV 14.7. CT-guided right upper lobe biopsy 01/2013 identified poorly differentiated carcinoma, favor lung primary after IHC stain, HER-2 IHC is negative.  She had bilobectomy 2/2013 found to have RLL 1.8 cm adenoCA, mod diff, Grade II; RUL 2.8 cm, mod to poorly diff adeno ca, grade III, total 18 LN - from stations 4, 9. 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

## 2020-07-02 ENCOUNTER — HOSPITAL ENCOUNTER (OUTPATIENT)
Dept: CT IMAGING | Facility: CLINIC | Age: 65
Discharge: HOME | End: 2020-07-02
Attending: INTERNAL MEDICINE | Admitting: INTERNAL MEDICINE
Payer: MEDICARE

## 2020-07-02 DIAGNOSIS — C34.90 MALIGNANT NEOPLASM OF LUNG, UNSPECIFIED LATERALITY, UNSPECIFIED PART OF LUNG (H): ICD-10-CM

## 2020-07-02 LAB
ALBUMIN SERPL-MCNC: 3.3 G/DL (ref 3.4–5)
ALP SERPL-CCNC: 95 U/L (ref 40–150)
ALT SERPL W P-5'-P-CCNC: 14 U/L (ref 0–50)
ANION GAP SERPL CALCULATED.3IONS-SCNC: 4 MMOL/L (ref 3–14)
AST SERPL W P-5'-P-CCNC: 10 U/L (ref 0–45)
BASOPHILS # BLD AUTO: 0.1 10E9/L (ref 0–0.2)
BASOPHILS NFR BLD AUTO: 0.6 %
BILIRUB SERPL-MCNC: 0.4 MG/DL (ref 0.2–1.3)
BUN SERPL-MCNC: 17 MG/DL (ref 7–30)
CALCIUM SERPL-MCNC: 9 MG/DL (ref 8.5–10.1)
CHLORIDE SERPL-SCNC: 106 MMOL/L (ref 94–109)
CO2 SERPL-SCNC: 29 MMOL/L (ref 20–32)
CREAT SERPL-MCNC: 0.79 MG/DL (ref 0.52–1.04)
DIFFERENTIAL METHOD BLD: NORMAL
EOSINOPHIL NFR BLD AUTO: 2.2 %
ERYTHROCYTE [DISTWIDTH] IN BLOOD BY AUTOMATED COUNT: 12.7 % (ref 10–15)
GFR SERPL CREATININE-BSD FRML MDRD: 78 ML/MIN/{1.73_M2}
GLUCOSE SERPL-MCNC: 106 MG/DL (ref 70–99)
HCT VFR BLD AUTO: 41.8 % (ref 35–47)
HGB BLD-MCNC: 13.8 G/DL (ref 11.7–15.7)
IMM GRANULOCYTES # BLD: 0.1 10E9/L (ref 0–0.4)
IMM GRANULOCYTES NFR BLD: 0.6 %
LYMPHOCYTES # BLD AUTO: 3 10E9/L (ref 0.8–5.3)
LYMPHOCYTES NFR BLD AUTO: 33.1 %
MCH RBC QN AUTO: 31.3 PG (ref 26.5–33)
MCHC RBC AUTO-ENTMCNC: 33 G/DL (ref 31.5–36.5)
MCV RBC AUTO: 95 FL (ref 78–100)
MONOCYTES # BLD AUTO: 0.7 10E9/L (ref 0–1.3)
MONOCYTES NFR BLD AUTO: 7.6 %
NEUTROPHILS # BLD AUTO: 5 10E9/L (ref 1.6–8.3)
NEUTROPHILS NFR BLD AUTO: 55.9 %
NRBC # BLD AUTO: 0 10*3/UL
NRBC BLD AUTO-RTO: 0 /100
PLATELET # BLD AUTO: 299 10E9/L (ref 150–450)
POTASSIUM SERPL-SCNC: 4.3 MMOL/L (ref 3.4–5.3)
PROT SERPL-MCNC: 7.6 G/DL (ref 6.8–8.8)
RBC # BLD AUTO: 4.41 10E12/L (ref 3.8–5.2)
SODIUM SERPL-SCNC: 139 MMOL/L (ref 133–144)
WBC # BLD AUTO: 8.9 10E9/L (ref 4–11)

## 2020-07-02 PROCEDURE — 80053 COMPREHEN METABOLIC PANEL: CPT | Performed by: INTERNAL MEDICINE

## 2020-07-02 PROCEDURE — 71250 CT THORAX DX C-: CPT

## 2020-07-02 PROCEDURE — 36415 COLL VENOUS BLD VENIPUNCTURE: CPT | Performed by: INTERNAL MEDICINE

## 2020-07-02 PROCEDURE — 85025 COMPLETE CBC W/AUTO DIFF WBC: CPT | Performed by: INTERNAL MEDICINE

## 2020-07-03 NOTE — RESULT ENCOUNTER NOTE
Attempted reaching patient again to review scan results and recommendation to have an US of Thyroid with possible FNA.  Result note routed back to Dr. Arroyo previously for orders.  Will attempt again next week.    Jeffry Smith RN, BSN, OCN  7/3/2020, 5:08 PM

## 2020-07-06 ENCOUNTER — TELEPHONE (OUTPATIENT)
Dept: ONCOLOGY | Facility: CLINIC | Age: 65
End: 2020-07-06

## 2020-07-07 NOTE — RESULT ENCOUNTER NOTE
Called and review CT scan results with patient and recommendations.  Patient reports that this happened a couple years back and biopsy was negative.  At this time patient agrees with US but will wait for results prior to deciding if she wants a biopsy or not.  Will schedule US and call patient back.      No orders at this time.  Will route for orders.    Jeffry Smith RN, BSN, OCN  7/7/2020, 9:37 AM

## 2020-07-09 ENCOUNTER — TELEPHONE (OUTPATIENT)
Dept: ONCOLOGY | Facility: CLINIC | Age: 65
End: 2020-07-09

## 2020-07-09 NOTE — TELEPHONE ENCOUNTER
See results note!  Patient wants to do US and then address if she needs a biopsy or not.    Jeffry Smith RN, BSN, OCN  7/9/2020, 4:24 PM

## 2020-07-10 NOTE — TELEPHONE ENCOUNTER
Patient scheduled for US on 7/14/20 with check in time of 9:45.  No prep needed.  Called patient with details.  No questions or concerns at this time.    Jeffry Smith RN, BSN, OCN  7/10/2020, 9:44 AM

## 2020-07-10 NOTE — TELEPHONE ENCOUNTER
New order placed by provider.  Cancelled pending order.    Jeffry Smith RN, BSN, OCN  7/10/2020, 9:32 AM

## 2020-07-14 ENCOUNTER — HOSPITAL ENCOUNTER (OUTPATIENT)
Dept: ULTRASOUND IMAGING | Facility: CLINIC | Age: 65
Discharge: HOME | End: 2020-07-14
Attending: INTERNAL MEDICINE | Admitting: INTERNAL MEDICINE
Payer: MEDICARE

## 2020-07-14 DIAGNOSIS — C34.90 MALIGNANT NEOPLASM OF LUNG, UNSPECIFIED LATERALITY, UNSPECIFIED PART OF LUNG (H): ICD-10-CM

## 2020-07-14 PROCEDURE — 76536 US EXAM OF HEAD AND NECK: CPT

## 2020-07-15 NOTE — RESULT ENCOUNTER NOTE
Called and reviewed results and recommendations with patient.  Patient's PCP is at [facility].  Patient reports that she will call them and schedule.  She will have them reach out for records.    Jeffry Smith RN, BSN, OCN  7/15/2020, 9:21 AM

## 2020-07-15 NOTE — RESULT ENCOUNTER NOTE
Please inform the patient.  These changes need to be followed by primary care physician.  Please forward the results to primary care physician.

## 2021-08-06 NOTE — TELEPHONE ENCOUNTER
Tizanidine        Last Written Prescription Date:  7/19/19  Last Fill Quantity: 90,   # refills: 2  Last Office Visit: 9/11/19  Future Office visit:    Next 5 appointments (look out 90 days)    Mar 17, 2020  2:00 PM CDT  [facility] Visit with Sandra [facility]o MD  [facility] [address] [place] [id number]  [phone]           Routing refill request to provider for review/approval because:  Drug not on the [facility], [facility] or  Health refill protocol or controlled substance     Full

## 2024-04-02 NOTE — TELEPHONE ENCOUNTER
Januvia stopped at LOV, per note: \"STOP januvia 100mg daily \"    Refill denied    Unable to leave voice message. Script for Norco was signed and mailed out to      [place] [facility]   [address] [facility] [address]      Chauncey Flores MA

## (undated) DEVICE — NDL SPINAL 26GA 3 1/2"

## (undated) DEVICE — TUBING EXTENSION SET MICROBORE 8.2" LL 7N8310

## (undated) DEVICE — GLOVE PROTEXIS W/NEU-THERA 8.0  2D73TE80

## (undated) DEVICE — DRSG BANDAID 1X3" FABRIC

## (undated) DEVICE — TRAY EPIDURAL 18GA SINGLE SHOT 406092

## (undated) DEVICE — SYR 03ML LL W/O NDL 309657

## (undated) DEVICE — TUBING IV EXTENSION SET 34"

## (undated) DEVICE — SYR 03ML LL W/O NDL

## (undated) DEVICE — NDL ECLIPSE 18GA 1.5"